# Patient Record
Sex: MALE | Race: WHITE | NOT HISPANIC OR LATINO | Employment: OTHER | URBAN - METROPOLITAN AREA
[De-identification: names, ages, dates, MRNs, and addresses within clinical notes are randomized per-mention and may not be internally consistent; named-entity substitution may affect disease eponyms.]

---

## 2017-01-31 ENCOUNTER — GENERIC CONVERSION - ENCOUNTER (OUTPATIENT)
Dept: OTHER | Facility: OTHER | Age: 77
End: 2017-01-31

## 2017-05-08 ENCOUNTER — ALLSCRIPTS OFFICE VISIT (OUTPATIENT)
Dept: OTHER | Facility: OTHER | Age: 77
End: 2017-05-08

## 2017-09-27 ENCOUNTER — ALLSCRIPTS OFFICE VISIT (OUTPATIENT)
Dept: OTHER | Facility: OTHER | Age: 77
End: 2017-09-27

## 2018-01-13 VITALS
HEIGHT: 70 IN | DIASTOLIC BLOOD PRESSURE: 60 MMHG | HEART RATE: 76 BPM | WEIGHT: 190 LBS | BODY MASS INDEX: 27.2 KG/M2 | SYSTOLIC BLOOD PRESSURE: 110 MMHG

## 2018-01-14 VITALS
SYSTOLIC BLOOD PRESSURE: 122 MMHG | BODY MASS INDEX: 28.63 KG/M2 | HEIGHT: 70 IN | DIASTOLIC BLOOD PRESSURE: 60 MMHG | WEIGHT: 200 LBS

## 2018-04-12 ENCOUNTER — PROCEDURE VISIT (OUTPATIENT)
Dept: NEUROLOGY | Facility: CLINIC | Age: 78
End: 2018-04-12
Payer: COMMERCIAL

## 2018-04-12 VITALS — SYSTOLIC BLOOD PRESSURE: 114 MMHG | RESPIRATION RATE: 14 BRPM | HEART RATE: 66 BPM | DIASTOLIC BLOOD PRESSURE: 55 MMHG

## 2018-04-12 DIAGNOSIS — Z96.89 S/P DEEP BRAIN STIMULATOR PLACEMENT: ICD-10-CM

## 2018-04-12 DIAGNOSIS — G20 PARKINSON DISEASE (HCC): Primary | ICD-10-CM

## 2018-04-12 DIAGNOSIS — K11.7 DROOLING: ICD-10-CM

## 2018-04-12 PROCEDURE — 95970 ALYS NPGT W/O PRGRMG: CPT | Performed by: PHYSICIAN ASSISTANT

## 2018-04-12 PROCEDURE — 93010 ELECTROCARDIOGRAM REPORT: CPT | Performed by: INTERNAL MEDICINE

## 2018-04-12 RX ORDER — LISINOPRIL 2.5 MG/1
0.5 TABLET ORAL DAILY
COMMUNITY
End: 2019-01-19 | Stop reason: HOSPADM

## 2018-04-12 RX ORDER — TAMSULOSIN HYDROCHLORIDE 0.4 MG/1
CAPSULE ORAL
COMMUNITY
Start: 2017-10-24 | End: 2018-10-15 | Stop reason: ALTCHOICE

## 2018-04-12 RX ORDER — ROPINIROLE 0.5 MG/1
1.5 TABLET, FILM COATED ORAL 3 TIMES DAILY
Qty: 810 TABLET | Refills: 3 | Status: SHIPPED | OUTPATIENT
Start: 2018-04-12 | End: 2018-10-15

## 2018-04-12 RX ORDER — GLIPIZIDE 5 MG/1
5 TABLET, FILM COATED, EXTENDED RELEASE ORAL DAILY
Refills: 3 | COMMUNITY
Start: 2018-03-22 | End: 2018-10-15 | Stop reason: ALTCHOICE

## 2018-04-12 RX ORDER — ATORVASTATIN CALCIUM 80 MG/1
TABLET, FILM COATED ORAL
COMMUNITY
Start: 2017-08-24

## 2018-04-12 RX ORDER — CARBIDOPA AND LEVODOPA 50; 200 MG/1; MG/1
1 TABLET, EXTENDED RELEASE ORAL
Qty: 90 TABLET | Refills: 3 | Status: SHIPPED | OUTPATIENT
Start: 2018-04-12

## 2018-04-12 RX ORDER — INSULIN GLARGINE 100 [IU]/ML
32 INJECTION, SOLUTION SUBCUTANEOUS
Refills: 3 | Status: ON HOLD | COMMUNITY
Start: 2018-03-17 | End: 2018-12-06

## 2018-04-12 RX ORDER — ROPINIROLE 0.5 MG/1
TABLET, FILM COATED ORAL
COMMUNITY
Start: 2017-11-10 | End: 2018-04-12 | Stop reason: SDUPTHER

## 2018-04-12 RX ORDER — CARBIDOPA AND LEVODOPA 50; 200 MG/1; MG/1
TABLET, EXTENDED RELEASE ORAL
COMMUNITY
Start: 2017-09-15 | End: 2018-04-12 | Stop reason: SDUPTHER

## 2018-04-12 RX ORDER — METFORMIN HYDROCHLORIDE 500 MG/1
1000 TABLET, EXTENDED RELEASE ORAL 2 TIMES DAILY
Refills: 3 | COMMUNITY
Start: 2018-02-21 | End: 2019-02-26 | Stop reason: HOSPADM

## 2018-04-12 NOTE — PROGRESS NOTES
Patient ID: Loretta Torres  is a [de-identified] y o  male  Assessment/Plan:    Parkinson disease ([facility] [address] )  Patient overall stable  He does have some leg and intermittent hand tremor on exam however this is not bothersome to the patient  He states that this gets better after taking his dose of Sinemet as well  He felt there was improvement of his tremors with the changes made at the last visit and he is now able to function well  His DBS was interrogated however no changes were made to his current settings given he is not bothered by his tremors and he is already at high settings  He was given some room with the patient  however if the tremors become more of an issue in the future  He was noted to have some bradykinesia on exam and discussed the option of a slight increase of his Sinemet dose  He does not wish to make any changes however  If in the future he changes his mind will have him try and increase to Sinemet 1 5tabs tid  He will also remain on Sinemet CR at night and ropinirole for now  He was encouraged to get more active and his daughter will be starting to come to the house throughout the week and will be starting an exercise routine with him  Drooling  Patient continues with moderate drooling  He is not interested in any treatment, medications or neurotoxin injections, at this time  S/P deep brain stimulator placement  Patient with a chargeable battery  Per the daughter he charges this every other day and the days when he gets charged he feels "wiped out"  She feels that he can not walk as well on those days and is not able to function as well as he does on the days he does not get charged  I am not clear why this would be however will run it by Lico Pan with [email or web address] to see if he has any thoughts              Subjective:    Mr Leandro Williamson is an [de-identified]year old man with Parkinson's disease diagnosed in 2005 s/p bilateral VIM DBS Activa PC 3/8/12 and device on 3/15/12 with IPG replacements 5/14/15 and 9/12/15, here for follow up  He was programmed by Jeffery Purcell from [place] since 2012 and was followed by Dr Lenore Pandey and presented to Dr Jane Mejia for transfer of care per patient and family in Oct 2015  He no longer follows with Dr Narda Shen and was managed by his  PCP, Dr Jackie Lock in [address] [place] [address]  To review, initial symptoms were facial masking followed by resting tremors  Symptoms worsened over time with changes in his gait with freezing of gait and drooling  After surgery tremors have been well controlled  At his last visit he was having some increased tremor and voltage was increased to 4 1 on the LEFT and 4 4 on the RIGHT with improvement  He also remains on Sinemet 25/100 1 tab tid (6:30am, noon, 4pm), Sinemet ER 50/200 qhs and ropinirole 0 5mg 3 tabs po tid (same time as levodopa)  He feels that he is doing well since the last visit  He has improvement of his tremors since the changes at the last visit  He lives with his wife  His son will help with showering and his wife helps with dressing  He walks well with the walker and cane  He has a rechargeable battery and per the daughter he is more sluggish when he gets charged  He gets charged every other day  No falls  He is eating well and denies any issues with swallowing  He has a lot of drooling during the day and he carries a rag  He is not interested in any treatment for this  He sleeps well through the night  He feels that the medication helps with his movements and he is able to get out of the chair easier  He denies any issues with feeding himself  He will have some tremor if he tries to eat right after taking his medication, if he waits a few minutes the tremors resolve  He walks around the home for exercise  He had PT a few months ago  Per the daughter he did better with the therapy  Objective:    Blood pressure 114/55, pulse 66, resp  rate 14      Physical Exam   Constitutional: He appears well-developed and well-nourished  Eyes: EOM are normal  Pupils are equal, round, and reactive to light  Neurological Exam    Mental Status  The patient is alert and oriented to person, place, time, and situation  He has normal attention span and concentration  He has a normal fund of knowledge  Cranial Nerves    CN II: The patient's visual acuity and visual fields are normal   CN III, IV, VI: The patient's pupils are equally round and reactive to light and ocular movements are normal   CN V: The patient has normal facial sensation  CN VII:  The patient has symmetric facial movement  CN VIII:  The patient's hearing is normal   CN IX, X: The patient has symmetric palate movement and normal gag reflex  CN XI: The patient's shoulder shrug strength is normal   CN XII: The patient's tongue is midline without atrophy or fasciculations  Motor    Mild amplitude rest tremor RUE 0, LUE 0, RLE 0, LLE 1  Mild action tremor with finger to nose testing on the right  Normal on the left  Mild to moderate bradykinesia on FT 2<2, HG 1,2 , ANEESH 1,2 , HT 1,1  No dyskinesia or dystonia  Mild rigidity bilaterally with activation  Moderate hypomimia  Moderate hypophonia  Sensory  The patient's sensation is to light touch  Reflexes  He has glabellar tap release signs present  He has no palmomental release signs  Gait and Coordination    Patient not up for ambulating today given he feels wiped out  ROS:    Review of Systems   Constitutional: Positive for fatigue  Negative for appetite change and fever  HENT: Positive for trouble swallowing  Negative for hearing loss, tinnitus and voice change  Eyes: Negative  Negative for photophobia and pain  Dry eyes   Respiratory: Positive for cough  Negative for shortness of breath  Cardiovascular: Negative  Negative for palpitations  Gastrointestinal: Negative  Negative for nausea and vomiting  Endocrine: Negative    Negative for cold intolerance and heat intolerance  Genitourinary: Negative  Negative for dysuria, frequency and urgency  Musculoskeletal: Positive for back pain and gait problem  Negative for myalgias and neck pain  Difficulty walking   Skin: Negative  Negative for rash  Skin lesions   Neurological: Positive for tremors and speech difficulty  Negative for dizziness, seizures, syncope, facial asymmetry, weakness, light-headedness, numbness and headaches  Snoring   Hematological: Negative  Does not bruise/bleed easily  Psychiatric/Behavioral: Positive for sleep disturbance  Negative for confusion and hallucinations

## 2018-04-13 PROBLEM — K11.7 DROOLING: Status: ACTIVE | Noted: 2018-04-13

## 2018-04-13 PROBLEM — Z96.89 S/P DEEP BRAIN STIMULATOR PLACEMENT: Status: ACTIVE | Noted: 2018-04-13

## 2018-04-13 NOTE — ASSESSMENT & PLAN NOTE
Patient overall stable  He does have some leg and intermittent hand tremor on exam however this is not bothersome to the patient  He states that this gets better after taking his dose of Sinemet as well  He felt there was improvement of his tremors with the changes made at the last visit and he is now able to function well  His DBS was interrogated however no changes were made to his current settings given he is not bothered by his tremors and he is already at high settings  He was given some room with the patient  however if the tremors become more of an issue in the future  He was noted to have some bradykinesia on exam and discussed the option of a slight increase of his Sinemet dose  He does not wish to make any changes however  If in the future he changes his mind will have him try and increase to Sinemet 1 5tabs tid  He will also remain on Sinemet CR at night and ropinirole for now  He was encouraged to get more active and his daughter will be starting to come to the house throughout the week and will be starting an exercise routine with him

## 2018-04-13 NOTE — ASSESSMENT & PLAN NOTE
Patient with a chargeable battery  Per the daughter he charges this every other day and the days when he gets charged he feels "wiped out"  She feels that he can not walk as well on those days and is not able to function as well as he does on the days he does not get charged  I am not clear why this would be however will run it by Adriana Wray with [employer] to see if he has any thoughts

## 2018-04-13 NOTE — ASSESSMENT & PLAN NOTE
Patient continues with moderate drooling  He is not interested in any treatment, medications or neurotoxin injections, at this time

## 2018-04-16 ENCOUNTER — APPOINTMENT (EMERGENCY)
Dept: CT IMAGING | Facility: HOSPITAL | Age: 78
End: 2018-04-16
Payer: COMMERCIAL

## 2018-04-16 ENCOUNTER — HOSPITAL ENCOUNTER (EMERGENCY)
Facility: HOSPITAL | Age: 78
Discharge: HOME | End: 2018-04-16
Attending: EMERGENCY MEDICINE | Admitting: EMERGENCY MEDICINE
Payer: COMMERCIAL

## 2018-04-16 ENCOUNTER — APPOINTMENT (EMERGENCY)
Dept: RADIOLOGY | Facility: HOSPITAL | Age: 78
End: 2018-04-16
Payer: COMMERCIAL

## 2018-04-16 VITALS
WEIGHT: 200 LBS | OXYGEN SATURATION: 94 % | HEART RATE: 60 BPM | HEIGHT: 70 IN | RESPIRATION RATE: 16 BRPM | TEMPERATURE: 97.5 F | DIASTOLIC BLOOD PRESSURE: 79 MMHG | SYSTOLIC BLOOD PRESSURE: 140 MMHG | BODY MASS INDEX: 28.63 KG/M2

## 2018-04-16 DIAGNOSIS — S09.90XA MINOR HEAD INJURY, INITIAL ENCOUNTER: ICD-10-CM
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
## 2018-04-16 DIAGNOSIS — S00.03XA HEMATOMA OF LEFT PARIETAL SCALP, INITIAL ENCOUNTER: ICD-10-CM

## 2018-04-16 LAB
ANION GAP SERPL CALCULATED.3IONS-SCNC: 9 MMOL/L (ref 4–13)
BACTERIA UR QL AUTO: ABNORMAL /HPF
BASOPHILS # BLD AUTO: 0.02 THOUSANDS/ΜL (ref 0–0.1)
BASOPHILS NFR BLD AUTO: 0 % (ref 0–1)
BILIRUB UR QL STRIP: NEGATIVE
BUN SERPL-MCNC: 25 MG/DL (ref 5–25)
CALCIUM SERPL-MCNC: 9.4 MG/DL (ref 8.3–10.1)
CHLORIDE SERPL-SCNC: 102 MMOL/L (ref 100–108)
CLARITY UR: CLEAR
CO2 SERPL-SCNC: 25 MMOL/L (ref 21–32)
COLOR UR: YELLOW
CREAT SERPL-MCNC: 1.27 MG/DL (ref 0.6–1.3)
EOSINOPHIL # BLD AUTO: 0.11 THOUSAND/ΜL (ref 0–0.61)
EOSINOPHIL NFR BLD AUTO: 2 % (ref 0–6)
ERYTHROCYTE [DISTWIDTH] IN BLOOD BY AUTOMATED COUNT: 13.2 % (ref 11.6–15.1)
GFR SERPL CREATININE-BSD FRML MDRD: 53 ML/MIN/1.73SQ M
GLUCOSE SERPL-MCNC: 125 MG/DL (ref 65–140)
GLUCOSE UR STRIP-MCNC: NEGATIVE MG/DL
HCT VFR BLD AUTO: 40 % (ref 36.5–49.3)
HGB BLD-MCNC: 13.2 G/DL (ref 12–17)
HGB UR QL STRIP.AUTO: ABNORMAL
KETONES UR STRIP-MCNC: NEGATIVE MG/DL
LEUKOCYTE ESTERASE UR QL STRIP: NEGATIVE
LYMPHOCYTES # BLD AUTO: 1.8 THOUSANDS/ΜL (ref 0.6–4.47)
LYMPHOCYTES NFR BLD AUTO: 27 % (ref 14–44)
MAGNESIUM SERPL-MCNC: 1.6 MG/DL (ref 1.6–2.6)
MCH RBC QN AUTO: 31.6 PG (ref 26.8–34.3)
MCHC RBC AUTO-ENTMCNC: 33 G/DL (ref 31.4–37.4)
MCV RBC AUTO: 96 FL (ref 82–98)
MONOCYTES # BLD AUTO: 0.76 THOUSAND/ΜL (ref 0.17–1.22)
MONOCYTES NFR BLD AUTO: 11 % (ref 4–12)
NEUTROPHILS # BLD AUTO: 4.07 THOUSANDS/ΜL (ref 1.85–7.62)
NEUTS SEG NFR BLD AUTO: 60 % (ref 43–75)
NITRITE UR QL STRIP: NEGATIVE
NON-SQ EPI CELLS URNS QL MICRO: ABNORMAL /HPF
NRBC BLD AUTO-RTO: 0 /100 WBCS
PH UR STRIP.AUTO: 5 [PH] (ref 4.5–8)
PLATELET # BLD AUTO: 206 THOUSANDS/UL (ref 149–390)
PMV BLD AUTO: 9.9 FL (ref 8.9–12.7)
POTASSIUM SERPL-SCNC: 4.3 MMOL/L (ref 3.5–5.3)
PROT UR STRIP-MCNC: NEGATIVE MG/DL
RBC # BLD AUTO: 4.18 MILLION/UL (ref 3.88–5.62)
RBC #/AREA URNS AUTO: ABNORMAL /HPF
SODIUM SERPL-SCNC: 136 MMOL/L (ref 136–145)
SP GR UR STRIP.AUTO: 1.02 (ref 1–1.03)
TROPONIN I SERPL-MCNC: <0.02 NG/ML
UROBILINOGEN UR QL STRIP.AUTO: 0.2 E.U./DL
WBC # BLD AUTO: 6.79 THOUSAND/UL (ref 4.31–10.16)
WBC #/AREA URNS AUTO: ABNORMAL /HPF

## 2018-04-16 PROCEDURE — 80048 BASIC METABOLIC PNL TOTAL CA: CPT | Performed by: EMERGENCY MEDICINE

## 2018-04-16 PROCEDURE — 71046 X-RAY EXAM CHEST 2 VIEWS: CPT

## 2018-04-16 PROCEDURE — 84484 ASSAY OF TROPONIN QUANT: CPT | Performed by: EMERGENCY MEDICINE

## 2018-04-16 PROCEDURE — 83735 ASSAY OF MAGNESIUM: CPT | Performed by: EMERGENCY MEDICINE

## 2018-04-16 PROCEDURE — 85025 COMPLETE CBC W/AUTO DIFF WBC: CPT | Performed by: EMERGENCY MEDICINE

## 2018-04-16 PROCEDURE — 81001 URINALYSIS AUTO W/SCOPE: CPT | Performed by: EMERGENCY MEDICINE

## 2018-04-16 PROCEDURE — 99284 EMERGENCY DEPT VISIT MOD MDM: CPT

## 2018-04-16 PROCEDURE — 70450 CT HEAD/BRAIN W/O DYE: CPT

## 2018-04-16 PROCEDURE — 36415 COLL VENOUS BLD VENIPUNCTURE: CPT | Performed by: EMERGENCY MEDICINE

## 2018-04-16 RX ORDER — CARBIDOPA AND LEVODOPA 25; 100 MG/1; MG/1
2 TABLET, EXTENDED RELEASE ORAL ONCE
Status: DISCONTINUED | OUTPATIENT
Start: 2018-04-16 | End: 2018-04-16 | Stop reason: HOSPADM

## 2018-04-16 RX ORDER — CARBIDOPA AND LEVODOPA 50; 200 MG/1; MG/1
1 TABLET, EXTENDED RELEASE ORAL ONCE
Status: DISCONTINUED | OUTPATIENT
Start: 2018-04-16 | End: 2018-04-16

## 2018-04-17 LAB
ATRIAL RATE: 60 BPM
P AXIS: -25 DEGREES
PR INTERVAL: 160 MS
QRS AXIS: -44 DEGREES
QRSD INTERVAL: 142 MS
QT INTERVAL: 476 MS
QTC INTERVAL: 476 MS
T WAVE AXIS: -15 DEGREES
VENTRICULAR RATE: 60 BPM

## 2018-04-17 NOTE — DISCHARGE INSTRUCTIONS
Head Injury   WHAT YOU NEED TO KNOW:   A head injury is most often caused by a blow to the head  This may occur from a fall, bicycle injury, sports injury, being struck in the head, or a motor vehicle accident  DISCHARGE INSTRUCTIONS:   Call 911 or have someone else call for any of the following:   · You cannot be woken  · You have a seizure  · You stop responding to others or you faint  · You have blurry or double vision  · Your speech becomes slurred or confused  · You have arm or leg weakness, loss of feeling, or new problems with coordination  · Your pupils are larger than usual or one pupil is a different size than the other  · You have blood or clear fluid coming out of your ears or nose  Seek care immediately if:   · You have repeated or forceful vomiting  · You feel confused  · Your headache gets worse or becomes severe  · You or someone caring for you notices that you are harder to wake than usual   Contact your healthcare provider if:   · Your symptoms last longer than 6 weeks after the injury  · You have questions or concerns about your condition or care  Medicines:   · Acetaminophen  decreases pain  Acetaminophen is available without a doctor's order  Ask how much to take and how often to take it  Follow directions  Acetaminophen can cause liver damage if not taken correctly  · Take your medicine as directed  Contact your healthcare provider if you think your medicine is not helping or if you have side effects  Tell him or her if you are allergic to any medicine  Keep a list of the medicines, vitamins, and herbs you take  Include the amounts, and when and why you take them  Bring the list or the pill bottles to follow-up visits  Carry your medicine list with you in case of an emergency  Self-care:   · Rest  or do quiet activities for 24 to 48 hours  Limit your time watching TV, using the computer, or doing tasks that require a lot of thinking   Slowly return to your normal activities as directed  Do not play sports or do activities that may cause you to get hit in the head  Ask your healthcare provider when you can return to sports  · Apply ice  on your head for 15 to 20 minutes every hour or as directed  Use an ice pack, or put crushed ice in a plastic bag  Cover it with a towel before you apply it to your skin  Ice helps prevent tissue damage and decreases swelling and pain  · Have someone stay with you for 24 hours  or as directed  This person can monitor you for complications and call [phone]  When you are awake the person should ask you a few questions to see if you are thinking clearly  An example would be to ask your name or your address  Prevent another head injury:   · Wear a helmet that fits properly  Do this when you play sports, or ride a bike, scooter, or skateboard  Helmets help decrease your risk of a serious head injury  Talk to your healthcare provider about other ways you can protect yourself if you play sports  · Wear your seat belt every time you are in a car  This helps to decrease your risk for a head injury if you are in a car accident  Follow up with your healthcare provider as directed:  Write down your questions so you remember to ask them during your visits  © 2017 [address] Information is for End User's use only and may not be sold, redistributed or otherwise used for commercial purposes  All illustrations and images included in CareNotes® are the copyrighted property of [employer] M [employer]  or [employer]  The above information is an  only  It is not intended as medical advice for individual conditions or treatments  Talk to your doctor, nurse or pharmacist before following any medical regimen to see if it is safe and effective for you  Scalp Contusion in Adults   WHAT YOU NEED TO KNOW:   A scalp contusion is a bruise on your scalp   There is bleeding under the scalp, but the skin is not broken  You may have swelling at the site of the bruise  DISCHARGE INSTRUCTIONS:   Home care:   · Have someone stay with you for 24 to 48 hours after the injury  Give him the signs of serious injury listed below, such as a seizure or trouble moving  You will need immediate care if you develop signs of a serious injury  · Apply ice to your bruise  Ice helps decrease swelling and pain  Ice may also help prevent tissue damage  Use an ice pack, or put crushed ice in a plastic bag  Cover it with a towel and place it on your bruise for 15 to 20 minutes every hour or as directed  Follow up with your healthcare provider as directed:  Write down your questions so you remember to ask them during your visits  Contact your healthcare provider if:   · You have a headache or neck pain that is getting worse  · You are drowsy and confused  · You have trouble staying balanced or walking  · You are irritable for no reason  · You have problems with your vision  · You cannot stop vomiting  Return to the emergency department or have someone call 911 if:   · You have a seizure  · You cannot be awakened  · You are not able to move part of your body  · Your pupils are different sizes  · You have blood or clear fluid coming out of your nose, ears, or mouth  © 2017 [facility] Information is for End User's use only and may not be sold, redistributed or otherwise used for commercial purposes  All illustrations and images included in CareNotes® are the copyrighted property of [employer] M [employer] Inc  or Jevon Lgauna  The above information is an  only  It is not intended as medical advice for individual conditions or treatments  Talk to your doctor, nurse or pharmacist before following any medical regimen to see if it is safe and effective for you        Fall Prevention for Older Adults   WHAT YOU NEED TO KNOW:   As you age, your muscles weaken and your risk for falls increases  Your risk also increases if you take medicines that make you sleepy or dizzy  You may also be at risk if you have vision or joint problems, have low blood pressure, or are not active  DISCHARGE INSTRUCTIONS:   Call 911 or have someone else call if:   · You have fallen and are unconscious  · You have fallen and cannot move part of your body  Contact your healthcare provider if:   · You have fallen and have pain or a headache  · You have questions or concerns about your condition or care  Fall prevention tips:   · Stay active  Exercise can help strengthen your muscles and improve your balance  Your healthcare provider may recommend water aerobics, walking, or Yuan Chi  He may also recommend physical therapy to improve your coordination  Never start an exercise program without asking your healthcare provider first     · Wear shoes that fit well and have soles that   Wear shoes both inside and outside  Use slippers with good   Avoid shoes with high heels  · Use assistive devices as directed  Your healthcare provider may suggest that you use a cane or walker to help you keep your balance  You may need to have grab bars put in your bathroom near the toilet or in the shower  · Stand or sit up slowly  This may help you keep your balance and prevent falls  · Wear a personal alarm  This is a device that allows you to call 911 if you need help  Ask for more information on personal alarms  · Manage your medical conditions  Keep all appointments with your healthcare providers  Visit your eye doctor as directed  Home safety tips:   · Add items to prevent falls in the bathroom  Put nonslip strips on your bath or shower floor to prevent you from slipping  Use a bath mat if you do not have carpet in the bathroom  This will prevent you from falling when you step out of the bath or shower  Use a shower seat so you do not need to stand while you shower   Sit on the toilet or a chair in your bathroom to dry yourself and put on clothing  This will prevent you from losing your balance from drying or dressing yourself while you are standing  · Keep paths clear  Remove books, shoes, and other objects from walkways and stairs  Place cords for telephones and lamps out of the way so that you do not need to walk over them  Tape them down if you cannot move them  Remove small rugs  If you cannot remove a rug, secure it with double-sided tape  This will prevent you from tripping  · Install bright lights in your home  Use night lights to help light paths to the bathroom or kitchen  Always turn on the light before you start walking  · Keep items you use often on shelves within reach  Do not use a step stool to help you reach an item  · Paint or place reflective tape on the edges of your stairs  This will help you see the stairs better  Follow up with your healthcare provider as directed:  Write down your questions so you remember to ask them during your visits  © 2017 [address] [facility] Information is for End User's use only and may not be sold, redistributed or otherwise used for commercial purposes  All illustrations and images included in CareNotes® are the copyrighted property of A D A M [employer] Inc  or Jevon Laguna  The above information is an  only  It is not intended as medical advice for individual conditions or treatments  Talk to your doctor, nurse or pharmacist before following any medical regimen to see if it is safe and effective for you

## 2018-04-17 NOTE — ED PROVIDER NOTES
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, BPH, coronary artery disease status post stent, defibrillator placement, cholecystectomy, appendectomy, presents to the emergency department after a fall at home  Patient states he was walking back from going to the bathroom earlier today but was not using his walker which she is supposed to use all of the time and he thinks he lost his balance which caused him to fall backwards  He struck the left side of his head on the night stand  He denies loss of consciousness  He went to his family doctor's who recommended he come to the ED given that he takes aspirin daily  He is not on any anticoagulant agents  He denies any presyncopal symptoms prior to falling and states he simply lost his balance  He has no complaints at this time  Denies headache, dizziness or near syncope, URI symptoms, cough, recent fever or chills, chest pain, palpitations, dyspnea, abdominal pain, nausea, vomiting, change in bowel habits, blood per rectum or melena, dysuria, frequency, hematuria, flank pain, neck or back pain, skin rash or color change, new extremity weakness or paresthesia or other focal neurologic deficits  According to patient's daughter, no history of dementia  History provided by:  Patient   used: No        Prior to Admission Medications   Prescriptions Last Dose Informant Patient Reported? Taking?    Cyanocobalamin (VITAMIN B 12 PO)   Yes Yes   Sig: Take by mouth   LANTUS SOLOSTAR injection pen 100 units/mL   Yes Yes   Sig: Inject 35 Units under the skin daily at bedtime   aspirin 81 MG tablet   Yes Yes   Sig: Take 1 tablet by mouth daily   atorvastatin (LIPITOR) 80 mg tablet   Yes Yes   Sig: TAKE 1 TABLET BY MOUTH EVERY DAY   carbidopa-levodopa (SINEMET CR)  mg per tablet   No Yes   Sig: Take 1 tablet by mouth daily at bedtime   carbidopa-levodopa (SINEMET)  mg per tablet   No Yes   Sig: Take 1 tablet by mouth 3 (three) times a day   cyanocobalamin 250 MCG tablet   Yes Yes   Si tab(s)   glipiZIDE (GLUCOTROL XL) 5 mg 24 hr tablet   Yes Yes   Sig: Take 5 mg by mouth daily   lisinopril (ZESTRIL) 2 5 mg tablet   Yes Yes   Sig: Take 0 5 tablets by mouth daily   metFORMIN (GLUCOPHAGE-XR) 500 mg 24 hr tablet   Yes Yes   Sig: Take 1,000 mg by mouth 2 (two) times a day   metoprolol tartrate (LOPRESSOR) 25 mg tablet   Yes Yes   Sig: Take 12 5 mg by mouth 2 (two) times a day   rOPINIRole (REQUIP) 0 5 mg tablet   No Yes   Sig: Take 3 tablets (1 5 mg total) by mouth 3 (three) times a day   tamsulosin (FLOMAX) 0 4 mg   Yes Yes   Sig: TAKE 1 CAPSULE EVERY DAY      Facility-Administered Medications: None       Past Medical History:   Diagnosis Date    Arthritis     BPH (benign prostatic hyperplasia)     Diabetes (HCC)     Heart problem     Movement disorder     [facility]'s disease ([facility] [address] )     [facility]        Past Surgical History:   Procedure Laterality Date    APPENDECTOMY      CARDIAC DEFIBRILLATOR PLACEMENT      CARDIAC DEFIBRILLATOR PLACEMENT      CHOLECYSTECTOMY      CORONARY ANGIOPLASTY WITH STENT PLACEMENT      DEEP BRAIN STIMULATOR PLACEMENT      EAR SURGERY      GALLBLADDER SURGERY      KNEE SURGERY      SKIN CANCER EXCISION         Family History   Problem Relation Age of Onset    Cancer Mother     Diabetes Father     Parkinsonism Father     Heart attack Father      I have reviewed and agree with the history as documented      Social History   Substance Use Topics    Smoking status: Never Smoker    Smokeless tobacco: Never Used    Alcohol use No        Review of Systems   Constitutional: Negative for chills, fatigue and fever  HENT: Negative for congestion, ear pain, hearing loss, rhinorrhea, sore throat and tinnitus  Eyes: Negative for photophobia, pain and visual disturbance  Respiratory: Negative for cough, chest tightness, shortness of breath and wheezing  Cardiovascular: Negative for chest pain and palpitations  Gastrointestinal: Negative for abdominal pain, blood in stool, constipation, diarrhea, nausea and vomiting  Genitourinary: Negative for dysuria, flank pain, frequency and hematuria  Musculoskeletal: Negative for back pain, neck pain and neck stiffness  Skin: Negative for color change, rash and wound  Allergic/Immunologic: Negative for immunocompromised state  Neurological: Negative for dizziness, syncope, weakness, light-headedness, numbness and headaches  Hematological: Negative for adenopathy  Does not bruise/bleed easily  Psychiatric/Behavioral: Negative for confusion and decreased concentration  All other systems reviewed and are negative  Physical Exam  ED Triage Vitals   Temperature Pulse Respirations Blood Pressure SpO2   04/16/18 1813 04/16/18 1811 04/16/18 1811 04/16/18 1811 04/16/18 1811   97 5 °F (36 4 °C) 64 18 104/61 95 %      Temp Source Heart Rate Source Patient Position - Orthostatic VS BP Location FiO2 (%)   04/16/18 1813 04/16/18 1811 04/16/18 1811 04/16/18 1811 --   Oral Monitor Sitting Left arm       Pain Score       04/16/18 1946       No Pain         Vitals:    04/16/18 1813 04/16/18 1833 04/16/18 1843 04/16/18 1946   BP:  126/69 126/69 114/58   BP Location:  Right arm Right arm Right arm   Pulse:  57 60 60   Resp:  18 16    Temp: 97 5 °F (36 4 °C)      TempSrc: Oral      SpO2:  96% 93% 95%   Weight:       Height:         Physical Exam   Constitutional: He is oriented to person, place, and time  He appears well-developed and well-nourished  No distress  HENT:   Head: Normocephalic     Right Ear: External ear normal    Left Ear: External ear normal    Nose: Nose normal    Mouth/Throat: Oropharynx is clear and moist  No oropharyngeal exudate  Small left parietal scalp hematoma  No laceration  Eyes: Conjunctivae and EOM are normal  Pupils are equal, round, and reactive to light  Neck: Normal range of motion  Neck supple  No JVD present  Cardiovascular: Normal rate, regular rhythm, normal heart sounds and intact distal pulses  Exam reveals no gallop and no friction rub  No murmur heard  Pulmonary/Chest: Effort normal and breath sounds normal  No respiratory distress  He has no wheezes  He has no rales  He exhibits no tenderness  Abdominal: Soft  Bowel sounds are normal  He exhibits no distension  There is no tenderness  There is no rebound and no guarding  Musculoskeletal: Normal range of motion  He exhibits no edema or tenderness  Lymphadenopathy:     He has no cervical adenopathy  Neurological: He is alert and oriented to person, place, and time  Mild dysarthria  Mild left facial drooping  According to daughter this is all baseline  No gross motor or sensory deficits  5/5 strength in all 4 extremities  Skin: Skin is warm and dry  No rash noted  He is not diaphoretic  No pallor  Psychiatric: He has a normal mood and affect  His behavior is normal    Nursing note and vitals reviewed        ED Medications  Medications   carbidopa-levodopa (SINEMET CR)  mg per ER tablet 2 tablet (not administered)       Diagnostic Studies  Results Reviewed     Procedure Component Value Units Date/Time    Urine Microscopic [[id number]]  (Abnormal) Collected:  04/16/18 1951    Lab Status:  Final result Specimen:  Urine from Urine, Clean Catch Updated:  04/16/18 2019     RBC, UA 0-1 (A) /hpf      WBC, UA None Seen /hpf      Epithelial Cells Occasional /hpf      Bacteria, UA Occasional /hpf     Troponin I [[id number]]  (Normal) Collected:  04/16/18 1945    Lab Status:  Final result Specimen:  Blood from Arm, Right Updated:  04/16/18 2009 Troponin I <0 02 ng/mL     Narrative:         [employer] Chemistry analyzer 99% cutoff is > 0 04 ng/mL in network labs    o cTnI 99% cutoff is useful only when applied to patients in the clinical setting of myocardial ischemia  o cTnI 99% cutoff should be interpreted in the context of clinical history, ECG findings and possibly cardiac imaging to establish correct diagnosis  o cTnI 99% cutoff may be suggestive but clearly not indicative of a coronary event without the clinical setting of myocardial ischemia  Basic metabolic panel [[id number]] Collected:  04/16/18 1945    Lab Status:  Final result Specimen:  Blood from Arm, Right Updated:  04/16/18 2002     Sodium 136 mmol/L      Potassium 4 3 mmol/L      Chloride 102 mmol/L      CO2 25 mmol/L      Anion Gap 9 mmol/L      BUN 25 mg/dL      Creatinine 1 27 mg/dL      Glucose 125 mg/dL      Calcium 9 4 mg/dL      eGFR 53 ml/min/1 73sq m     Narrative:         National Kidney Disease Education Program recommendations are as follows:  GFR calculation is accurate only with a steady state creatinine  Chronic Kidney disease less than 60 ml/min/1 73 sq  meters  Kidney failure less than 15 ml/min/1 73 sq  meters      Magnesium [[id number]]  (Normal) Collected:  04/16/18 1945    Lab Status:  Final result Specimen:  Blood from Arm, Right Updated:  04/16/18 2002     Magnesium 1 6 mg/dL     UA w Reflex to Microscopic [[id number]]  (Abnormal) Collected:  04/16/18 1951    Lab Status:  Final result Specimen:  Urine from Urine, Clean Catch Updated:  04/16/18 1958     Color, UA Yellow     Clarity, UA Clear     Specific Gravity, UA 1 025     pH, UA 5 0     Leukocytes, UA Negative     Nitrite, UA Negative     Protein, UA Negative mg/dl      Glucose, UA Negative mg/dl      Ketones, UA Negative mg/dl      Urobilinogen, UA 0 2 E U /dl      Bilirubin, UA Negative     Blood, UA Trace-Intact (A)    CBC and differential [[id number]]  (Normal) Collected:  04/16/18 1945    Lab Status:  Final result Specimen:  Blood from Arm, Right Updated:  04/16/18 1953     WBC 6 79 Thousand/uL      RBC 4 18 Million/uL      Hemoglobin 13 2 g/dL      Hematocrit 40 0 %      MCV 96 fL      MCH 31 6 pg      MCHC 33 0 g/dL      RDW 13 2 %      MPV 9 9 fL      Platelets 126 Thousands/uL      nRBC 0 /100 WBCs      Neutrophils Relative 60 %      Lymphocytes Relative 27 %      Monocytes Relative 11 %      Eosinophils Relative 2 %      Basophils Relative 0 %      Neutrophils Absolute 4 07 Thousands/µL      Lymphocytes Absolute 1 80 Thousands/µL      Monocytes Absolute 0 76 Thousand/µL      Eosinophils Absolute 0 11 Thousand/µL      Basophils Absolute 0 02 Thousands/µL                  CT head without contrast   Final Result by Epifanio Stokes DO (04/16 2047)      No acute intracranial abnormality  Microangiopathic changes  Bilateral deep brain stimulator leads appear grossly unchanged  Workstation performed: [id number]         XR chest 2 views   Final Result by Fredis Delgado MD (04/16 2033)      No acute cardiopulmonary disease  Workstation performed: [id number]                    Procedures  ECG 12 Lead Documentation  Date/Time: 4/16/2018 6:50 PM  Performed by: Missy Escalera by: Chely Faust     ECG reviewed by me, the ED Provider: yes    Patient location:  ED  Previous ECG:     Previous ECG:  Compared to current    Comparison ECG info:  No significant change from prior EKG on 9/18/2015  Rate:     ECG rate:  60    ECG rate assessment: normal    Rhythm:     Rhythm: sinus rhythm and paced    Pacing:     Capture:  Complete    Type of pacing:  Atrial  Ectopy:     Ectopy: none    QRS:     QRS axis:  Left    QRS intervals:   Wide  Conduction:     Conduction: abnormal      Abnormal conduction: complete RBBB    ST segments:     ST segments:  Normal  T waves:     T waves: non-specific    Q waves:     Q waves:  V2, V3, V1 and V4           Phone Contacts  ED Phone Contact    ED Course  ED Course as of Apr 16 2100 Mon Apr 16, 2018 2058 Workup unremarkable  CT scan of head normal/unchanged  Patient and family updated  Pt stable for discharge  Discussed ED return parameters including signs and symptoms of head injury to look out for  MDM  Number of Diagnoses or Management Options  Diagnosis management comments: 12-year-old male presents status post fall and head injury without loss of consciousness  Patient likely lost balance as he was not using his walker  He denies any presyncopal symptoms  Given age and medical comorbidities, will check basic blood work as well as chest x-ray  Will obtain a CT scan of the head to rule out intracranial hemorrhage  If negative workup, will discharge home  Amount and/or Complexity of Data Reviewed  Clinical lab tests: ordered and reviewed  Tests in the radiology section of CPT®: ordered and reviewed  Tests in the medicine section of CPT®: reviewed and ordered  Obtain history from someone other than the patient: yes  Independent visualization of images, tracings, or specimens: yes      CritCare Time    Disposition  Final diagnoses:   Fall, initial encounter   Minor head injury, initial encounter   Hematoma of left parietal scalp, initial encounter     Time reflects when diagnosis was documented in both MDM as applicable and the Disposition within this note     Time User Action Codes Description Comment    4/16/2018  8:57 PM Alexandra Kaileye E Add [H33  RFIL] Fall, initial encounter     4/16/2018  8:57 PM Alexandra Spire E Add [S09 90XA] Minor head injury, initial encounter     4/16/2018  8:57 PM Alexandra Spire E Add [S00 [id number]] Hematoma of left parietal scalp, initial encounter       ED Disposition     ED Disposition Condition Comment    Discharge  [address]  discharge to home/self care      Condition at discharge: Stable        Follow-up Information     Follow up With Specialties Details Why Contact Info Additional Information    Fide Hammond MD Internal Medicine Schedule an appointment as soon as possible for a visit  [address] [facility]  [address]  [address] [place] [id number] [address] [facility] Emergency Department Emergency Medicine Go to If symptoms worsen [address] [place] [address]  [phone] [place] ED, [address], [facility]        Patient's Medications   Discharge Prescriptions    No medications on file     No discharge procedures on file      ED Provider  Electronically Signed by           Neva Pinto DO  04/16/18 2100

## 2018-10-15 ENCOUNTER — PROCEDURE VISIT (OUTPATIENT)
Dept: NEUROLOGY | Facility: CLINIC | Age: 78
End: 2018-10-15
Payer: COMMERCIAL

## 2018-10-15 VITALS
HEIGHT: 70 IN | WEIGHT: 195 LBS | BODY MASS INDEX: 27.92 KG/M2 | SYSTOLIC BLOOD PRESSURE: 98 MMHG | DIASTOLIC BLOOD PRESSURE: 60 MMHG

## 2018-10-15 DIAGNOSIS — R13.10 DYSPHAGIA, UNSPECIFIED TYPE: ICD-10-CM

## 2018-10-15 DIAGNOSIS — K11.7 DROOLING: ICD-10-CM

## 2018-10-15 DIAGNOSIS — G20 PARKINSON'S DISEASE WITH USE OF ELECTRICAL BRAIN STIMULATION (HCC): Primary | ICD-10-CM

## 2018-10-15 PROCEDURE — 95978 PR ANALYZE NEUROSTIM BRAIN, FIRST 1H: CPT | Performed by: PSYCHIATRY & NEUROLOGY

## 2018-10-15 PROCEDURE — 99214 OFFICE O/P EST MOD 30 MIN: CPT | Performed by: PSYCHIATRY & NEUROLOGY

## 2018-10-15 RX ORDER — ROPINIROLE 2 MG/1
2 TABLET, FILM COATED, EXTENDED RELEASE ORAL
Qty: 90 TABLET | Refills: 1 | Status: SHIPPED | OUTPATIENT
Start: 2018-10-15

## 2018-10-15 NOTE — ASSESSMENT & PLAN NOTE
I recommended a trial of atropine drops  He wishes not to add any medication  Will call should he change his mind

## 2018-10-15 NOTE — PATIENT INSTRUCTIONS
Will obtain video swallow evaluation to further assess swallow function  Call if you change your mind about taking a medication for drooling  We can try atropine drops under the tongue

## 2018-10-15 NOTE — ASSESSMENT & PLAN NOTE
Main issues today are tremor of left leg, drooling and dysphagia  Over 40 minutes spent with the patient  Deep brain stimulator was interrogated and changes made with dampening of left leg tremor  Increase in voltage in right lead, no change so returned to prior  Increase in Freq to 210, 220 Hz- no change so returned to prior  Increased PW to 150 and tremor dampened  He has daytime sedation which he feels is related to his PD medication  We can try switching ropinirole to ropinirole XL to be given at night to see if this may help reduce daytime sedation and maintain on during the day

## 2018-10-15 NOTE — PROGRESS NOTES
Patient ID: Peg Le  is a [de-identified] y o  male  Assessment/Plan:    Parkinson's disease with use of electrical brain stimulation ([facility] [address] )  Main issues today are tremor of left leg, drooling and dysphagia  Over 40 minutes spent with the patient  Deep brain stimulator was interrogated and changes made with dampening of left leg tremor  Increase in voltage in right lead, no change so returned to prior  Increase in Freq to 210, 220 Hz- no change so returned to prior  Increased PW to 150 and tremor dampened  He has daytime sedation which he feels is related to his PD medication  We can try switching ropinirole to ropinirole XL to be given at night to see if this may help reduce daytime sedation and maintain on during the day  Dysphagia  Will obtain video swallow evaluation given reports of coughing while eating and taking pills  Drooling  I recommended a trial of atropine drops  He wishes not to add any medication  Will call should he change his mind  Diagnoses and all orders for this visit:    Parkinson's disease with use of electrical brain stimulation ([facility] [address] )  -     FL barium swallow video w speech; Future  -     rOPINIRole (REQUIP XL) 2 MG 24 hr tablet; Take 1 tablet (2 mg total) by mouth daily at bedtime    Drooling    Dysphagia, unspecified type  -     FL barium swallow video w speech; Future           Subjective:    Mr Bayron Sanchez is an [de-identified]year old man with Parkinson's disease diagnosed in 2005 s/p bilateral VIM DBS Activa PC 3/8/12 and device on 3/15/12 with IPG replacements 5/14/15 and 9/12/15, here for follow up  He was programmed by Terry Wagner from [place] since 2012 and was followed by Dr Pool Collier and presented to me for transfer of care in Oct 2015  To review, initial symptoms were facial masking followed by resting tremors  Symptoms worsened over time with changes in his gait with freezing of gait and drooling  After surgery tremors have been well controlled      He is overall doing okay  He reports having a left leg tremor typically after he takes his metformin  He is drooling all day and carries a towel  He reports coughing when he takes his pills  He cough while eating at times per his daughter in law  He lives with his wife  His daughter in law and wife will help with showering and with dressing  He walks well with the walker and cane  No falls  He denies any issues with swallowing  He sleeps during the day so at night he has some trouble sleeping  He is on Sinemet 6, 12, 4, and Sinemet ER 6-6:30pm , and ropinirole 0 5mg tid, and then he goes to his recliner at 7pm but is up a lot of the night  They feel his medication makes him sleepy which is why he is sleeping all day  He does not feel lightheaded on arising  He denies any constipation or urinary issues  The following portions of the patient's history were reviewed and updated as appropriate: allergies, current medications, past family history, past medical history, past social history and past surgical history  Objective:    Blood pressure 98/60, height 5' 10" (1 778 m), weight 88 5 kg (195 lb)  Physical Exam   Constitutional: He appears well-developed  Eyes: Pupils are equal, round, and reactive to light  EOM are normal    Psychiatric: His speech is normal    Vitals reviewed  Neurological Exam  Mental Status   Oriented to person, place, time and situation  Speech is normal  Follows three-step commands  Attention and concentration are normal     Cranial Nerves  CN II: Visual fields full to confrontation  CN III, IV, VI: Extraocular movements intact bilaterally  Pupils equal round and reactive to light bilaterally  CN V: Facial sensation is normal   CN VII: Full and symmetric facial movement  CN VIII: Hearing is normal   CN IX, X: Palate elevates symmetrically  Normal gag reflex    CN XI: Shoulder shrug strength is normal   CN XII: Tongue midline without atrophy or fasciculations  Motor    Mild amplitude rest tremor RUE 0, LUE 0, RLE 0, LLE 1  Mild action tremor with finger to nose testing on the right  Normal on the left  Mild to moderate bradykinesia on FT 2<2, HG 1,2 , ANEESH 1,2 , HT 1,1  No dyskinesia or dystonia  Mild rigidity bilaterally with activation  Moderate hypomimia  Moderate hypophonia       Sensory  Light touch is normal in upper and lower extremities  Reflexes  Glabellar tap present  Gait Unable to rise from chair without using arms  Decreased stride R>L         ROS:    Review of Systems   Constitutional: Negative  Negative for appetite change and fever  HENT: Positive for drooling, trouble swallowing and voice change  Negative for hearing loss and tinnitus  Eyes: Negative  Negative for photophobia and pain  Respiratory: Positive for cough and choking  Negative for shortness of breath  Cardiovascular: Negative  Negative for palpitations  Gastrointestinal: Negative  Negative for nausea and vomiting  Endocrine: Negative  Negative for cold intolerance and heat intolerance  Genitourinary: Negative  Negative for dysuria, frequency and urgency  Musculoskeletal: Positive for gait problem  Negative for myalgias and neck pain  Skin: Negative  Negative for rash  Neurological: Positive for tremors  Negative for dizziness, seizures, syncope, facial asymmetry, speech difficulty, weakness, light-headedness, numbness and headaches  Hematological: Negative  Does not bruise/bleed easily  Psychiatric/Behavioral: Positive for sleep disturbance  Negative for confusion and hallucinations                                      Time since last dose:      Speech  1    Facial Expression  3    Rigidity - Neck  0    Rigidity - Upper Extremity (Right)  2    Rigidity - Upper Extremity (Left)   2    Rigidity - Lower Extremity (Right)  0    Rigidity - Lower Extremity (Left)   0    Finger Taps (Right)   3    Finger Taps (Left)   3    Hand Movement (Right)  2    Hand Movement (Left)   2    Pronation/Supination (Right)  2    Pronation/Supination (Left)   2    Toe Tapping (Right) 0    Toe Tapping (Left) 0    Leg Agility (Right)  0    Leg Agility (Left)   0    Arising from Chair   2    Gait   1    Freezing of Gait 0    Postural Stability   0    Posture 1    Global spontaneity of movement 2    Postural Tremor (Right) 0    Postural Tremor (Left) 0    Kinetic Tremor (Right)  0    Kinetic Tremor (Left)  0    Rest tremor amplitude RUE 00    Rest tremor amplitude LUE 0    Rest tremor amplitude RLE 1    Reset tremor amplitude LLE 2    Lip/Jaw Tremor  0    Consistency of tremor 2    Motor Exam Total:

## 2018-10-30 ENCOUNTER — TELEPHONE (OUTPATIENT)
Dept: NEUROLOGY | Facility: CLINIC | Age: 78
End: 2018-10-30

## 2018-10-30 NOTE — TELEPHONE ENCOUNTER
Pt's dtr in law Whitney Delgadillo called, states that pt feels more tired during the day since switching ropinirole to XL  Pt sleeps during the day  Sleeps at intervals at night  Pt takes his ropinirole XL 2 mg around 6 pm  No other symptoms  Whitney Delgadillo states that when pt was taking Ropinirole, he was already feeling tired  Now he feels more lethargic during the day  Asking if sinemet can make him tired as well?   Pt takes Sinemet  mg, 1 tab @ 6, 12, 4, and Sinemet ER  mg, 1 tab around 6-6:30pm         [phone] Whitney Delgadillo

## 2018-10-31 NOTE — TELEPHONE ENCOUNTER
Pt's DIL Costella Meckel called re: below  Asking if Ropinirole should be taken later time, like 8 pm instead of 6 pm?           [phone]

## 2018-11-01 ENCOUNTER — HOSPITAL ENCOUNTER (OUTPATIENT)
Dept: RADIOLOGY | Facility: HOSPITAL | Age: 78
Discharge: HOME | End: 2018-11-01
Attending: PSYCHIATRY & NEUROLOGY
Payer: COMMERCIAL

## 2018-11-01 DIAGNOSIS — R13.10 DYSPHAGIA, UNSPECIFIED TYPE: ICD-10-CM

## 2018-11-01 DIAGNOSIS — G20 PARKINSON'S DISEASE WITH USE OF ELECTRICAL BRAIN STIMULATION (HCC): ICD-10-CM

## 2018-11-01 PROCEDURE — G8996 SWALLOW CURRENT STATUS: HCPCS

## 2018-11-01 PROCEDURE — 74230 X-RAY XM SWLNG FUNCJ C+: CPT

## 2018-11-01 PROCEDURE — 92611 MOTION FLUOROSCOPY/SWALLOW: CPT

## 2018-11-01 PROCEDURE — G8997 SWALLOW GOAL STATUS: HCPCS

## 2018-11-01 PROCEDURE — G8998 SWALLOW D/C STATUS: HCPCS

## 2018-11-01 NOTE — PROCEDURES
Video Barium Swallow Study       Patient Name: Armani Parikh  Today's Date: 11/1/2018        Past Medical History     Past Medical History:   Diagnosis Date    Arthritis     BPH (benign prostatic hyperplasia)     Diabetes [facility] [address] [facility]     Heart problem     Movement disorder     Parkinson's disease ([facility] [address] [facility]     Stroke ([facility] [address] [facility]         Past Surgical History  Past Surgical History:   Procedure Laterality Date    APPENDECTOMY      CARDIAC DEFIBRILLATOR PLACEMENT      CARDIAC DEFIBRILLATOR PLACEMENT      CHOLECYSTECTOMY      CORONARY ANGIOPLASTY WITH STENT PLACEMENT      DEEP BRAIN STIMULATOR PLACEMENT      EAR SURGERY      GALLBLADDER SURGERY      KNEE SURGERY      SKIN CANCER EXCISION             General Information: This [de-identified] y o  male was seen today on an outpatient basis  He was referred for a video swallow study by his neurologist, Kiana Francis MD, due to recent onset of difficulty swallowing  History was provided by the patient as well as his daughter-in-law Ananda Alexander who was present for the study  For the past few months, the patient has had variable frequency of coughing, choking, and inability to swallow with intake by mouth  The patient and Ananda Alexander disagree regarding the severity and the frequency of his dysphagia: Ananda Alexander states he usually struggles for 20-30 minutes to take pills, unless they are very small, and that he has severe coughing and choking at every meal; the patient states "I can't take pills at all" and "I can only chew if it's soft" but also "I don't cough that much" and "I ate pork chops on Saturday with no problem "  A video swallow study was conducted in order to investigate the complaint of dysphagia       Previous medical history includes the above as well as CVA approximately 3 years ago; the patient and his daughter-in-law deny any dysphagia related to that stroke  Deep brain stimulation was placed in 2012       The study was conducted in lateral view   Textures assessed during this study include nectar-thick liquids, thin liquids, puree (applesauce), and soft solids (small pieces of tami cracker crumbled in barium paste)  Strategies such as a chin tuck or preparatory set were not found to significantly impact the swallow as the patient often presented with significantly delayed initiation of movement (>10 seconds of effort required)         Oral Stage:  Base of tongue retraction was profoundly reduced  Velopharyngeal closure was within normal limits  Nectar-Thick Liquids via Spoon: Intermittent lingual pumping and premature spillage to the valleculae  Nectar-Thick Liquids via Cup: Immediate spillage to the valleculae and over the top of the epiglottis, sometimes reaching the pyriform sinuses  Thin Liquids via Cup: Immediate spillage to the pyriform sinuses upon acceptance  Puree: Brief loss of bolus control during oral manipulation with the bolus spilling over the side of the tongue and into the right buccal sulcus; the patient independently performed a tongue sweep and successfully recovered the bolus  Lingual pumping lasting 18 seconds with the initial swallow  Soft Solids: Mastication grossly within normal limits despite the patient's lack of dentition  Piecemeal oral transit with a total of three transfers and swallows observed          Pharyngeal Stage:  Hyolaryngeal elevation was within normal limits  Epiglottic inversion was sluggish and incomplete during the swallow, although the patient also appears to have a very small epiglottis  This is likely to be due to anatomical variation as the patient denies any history of trauma or surgery involving the throat or neck  Pharyngeal contraction was mildly reduced with all trials  Nectar-Thick Liquids via Spoon: Moderate oral and minimal vallecular and pyriform sinus residuals after the swallow   The patient independently performed an additional swallow to clear these residuals, resulting in trace shallow laryngeal penetration from the pyriform sinuses  After a total of five swallows, there remained trace oral residuals, minimal to moderate vallecular residuals, and moderate pyriform sinus residuals  Nectar-Thick Liquids via Cup: Trace shallow penetration during the swallow, clearing completely with the swallow  Moderate oral, vallecular, and pyriform sinus residuals after the swallow  The patient independently performed a second swallow, which cleared the oral and vallecular residuals, but only mildly reduced the pyriform sinus residuals, which then began to passively spill into the larynx  A cued throat clear and swallow was successful to clear the remaining residuals from the pharynx and larynx  Thin Liquids via Cup: Variable penetration and aspiration observed across trials both during and after the swallow, ranging from shallow trace laryngeal penetration to charmaine aspiration  There was no sensory response to penetration or aspiration  Puree: Moderate oral and severe vallecular residuals after the swallow  The patient made no independent attempt to clear these  Two additional cued swallows pushed the residuals further along the pharynx to the pyriform sinuses, but resulted in little clearance  Over time these residuals began to spill from the pyriform sinuses to the laryngeal vestibule, eventually resulting in deep penetration to the vocal folds and cough  After a total of eight swallows, there remained minimal oral residuals, minimal to moderate vallecular residuals, and moderate-severe pyriform sinus residuals, as well as coating of the anterior laryngeal wall  Soft Solids: Shallow penetration during the swallow  Increasing vallecular and pyriform sinus residuals over successive swallows, with eventual passive laryngeal penetration  After four swallows, there remained moderate vallecular and pyriform sinus residuals  A liquid wash of a small amount of nectar-thick liquid was useful to clear residuals  Esophageal Stage:  Not formally assessed  Upper esophageal sphincter opening intermittently appeared to be reduced in diameter, which may be contributing to the patient's residuals  Assessment Summary:  The patient presents with moderate oral and pharyngeal dysphagia characterized by: profoundly impaired lingual retraction; frequent post-swallow accumulation of residuals; silent aspiration during and after the swallow with thin liquids; and passive laryngeal penetration of residuals after the swallow with other textures  Passive laryngeal penetration to the vocal folds was observed with puree and did result in an immediate cough  Strongly suspect that the patient has reduced oropharyngeal sensation  Incidental findings included intermittent reduced opening of the upper esophageal sphincter         Recommendations:  1  Soft/moist solid diet with nectar-thick liquids  2  SMALL bites and sips  3  After every 2-3 swallows of food or liquid, cough or clear the throat, and swallow an additional time  This will help clear residuals from the throat  4  Medications crushed and mixed with puree (pudding, applesauce, yogurt, etc)  Very small pills can be taken one at a time in a small spoonful of puree  5  Consider outpatient or home health speech therapy treatment for dysphagia, with potential treatment targets to include pharyngeal strength, lingual strength and coordination, volitional cough/clear as described above, and strategies for safer intake  6  Consider gastroenterology consult to investigate esophageal function            Results and recommendations were discussed in detail with the patient and his daughter-in-law Ananda Alexander immediately following the study         Erick Matthews MA, [id number] [facility]  Speech-Language Pathologist

## 2018-11-02 NOTE — TELEPHONE ENCOUNTER
They can try giving at 8pm to see if that makes a difference  Sinemet can cause sleepiness in some patients

## 2018-11-02 NOTE — TELEPHONE ENCOUNTER
Called and advised pt's dtr in law Vikki Rattler of all of the below  She verbalized clear understanding of all instructions

## 2018-11-19 ENCOUNTER — TELEPHONE (OUTPATIENT)
Dept: NEUROLOGY | Facility: CLINIC | Age: 78
End: 2018-11-19

## 2018-11-19 NOTE — TELEPHONE ENCOUNTER
Rolanda Klein with Shalom Meredith @ [facility]  [phone]    She states the patient wants to go to bed at 6:30 pm every night and they are having difficulty getting all of sinemet doses in within this time frame  She is questioning what times the patient can have his sinemet doses if he is going to bed this early  How many hours need to be in between daily doses and night time dose? Please advise

## 2018-11-20 NOTE — TELEPHONE ENCOUNTER
Jyothi Cochran called back with update  She gave more information stating that the regular sinemet he takes at 7am, 11am, and 3-4am  The night time CR dose he sometimes forgets to take before bed at 6:30  She is asking how soon after taking the regular sinemet can pt take the CR? Call was disconnected   When trying to call Jyothi Cochran back I kept getting the message "call can not be completed at this time"

## 2018-11-21 NOTE — TELEPHONE ENCOUNTER
Ignacio Stantoney at home called back stated that the family was concered about the timing of his medication because he has been going to bed earlier  They have been giving it at 6am 12 noon and 4pm, and ER at 7pm  He was been falling asleep prior to 7  She advised that they administer it at 6am, 10am and 2pm, and the ER at 6pm when he goes to bed , questioning if this is ok?     [phone]

## 2018-12-04 ENCOUNTER — HOSPITAL ENCOUNTER (INPATIENT)
Facility: HOSPITAL | Age: 78
LOS: 1 days | Discharge: HOME HEALTH | DRG: 637 | End: 2018-12-06
Attending: EMERGENCY MEDICINE | Admitting: INTERNAL MEDICINE
Payer: COMMERCIAL

## 2018-12-04 ENCOUNTER — APPOINTMENT (EMERGENCY)
Dept: CT IMAGING | Facility: HOSPITAL | Age: 78
DRG: 637 | End: 2018-12-04
Payer: COMMERCIAL

## 2018-12-04 ENCOUNTER — APPOINTMENT (EMERGENCY)
Dept: RADIOLOGY | Facility: HOSPITAL | Age: 78
DRG: 637 | End: 2018-12-04
Payer: COMMERCIAL

## 2018-12-04 ENCOUNTER — TELEPHONE (OUTPATIENT)
Dept: NEUROLOGY | Facility: CLINIC | Age: 78
End: 2018-12-04

## 2018-12-04 DIAGNOSIS — G20 PARKINSON'S DISEASE WITH USE OF ELECTRICAL BRAIN STIMULATION (HCC): ICD-10-CM

## 2018-12-04 DIAGNOSIS — N20.0 RENAL CALCULI: ICD-10-CM

## 2018-12-04 DIAGNOSIS — E16.2 HYPOGLYCEMIA: Primary | ICD-10-CM

## 2018-12-04 DIAGNOSIS — E11.9 TYPE 2 DIABETES MELLITUS (HCC): ICD-10-CM

## 2018-12-04 DIAGNOSIS — R41.82 ALTERED MENTAL STATE: ICD-10-CM

## 2018-12-04 DIAGNOSIS — R13.10 DYSPHAGIA, UNSPECIFIED TYPE: ICD-10-CM

## 2018-12-04 DIAGNOSIS — G93.41 ACUTE METABOLIC ENCEPHALOPATHY: ICD-10-CM

## 2018-12-04 PROBLEM — R53.1 GENERALIZED WEAKNESS: Status: ACTIVE | Noted: 2018-12-04

## 2018-12-04 PROBLEM — I10 HTN (HYPERTENSION): Status: ACTIVE | Noted: 2018-12-04

## 2018-12-04 PROBLEM — K86.2 PANCREATIC CYST: Status: ACTIVE | Noted: 2018-12-04

## 2018-12-04 PROBLEM — Z86.73 HISTORY OF CVA (CEREBROVASCULAR ACCIDENT): Status: ACTIVE | Noted: 2018-12-04

## 2018-12-04 LAB
ALBUMIN SERPL BCP-MCNC: 3.5 G/DL (ref 3.5–5)
ALP SERPL-CCNC: 84 U/L (ref 46–116)
ALT SERPL W P-5'-P-CCNC: 11 U/L (ref 12–78)
ANION GAP SERPL CALCULATED.3IONS-SCNC: 11 MMOL/L (ref 4–13)
AST SERPL W P-5'-P-CCNC: 32 U/L (ref 5–45)
ATRIAL RATE: 75 BPM
BACTERIA UR QL AUTO: ABNORMAL /HPF
BASOPHILS # BLD AUTO: 0.01 THOUSANDS/ΜL (ref 0–0.1)
BASOPHILS NFR BLD AUTO: 0 % (ref 0–1)
BILIRUB SERPL-MCNC: 2.5 MG/DL (ref 0.2–1)
BILIRUB UR QL STRIP: NEGATIVE
BUN SERPL-MCNC: 25 MG/DL (ref 5–25)
CALCIUM SERPL-MCNC: 9.4 MG/DL (ref 8.3–10.1)
CHLORIDE SERPL-SCNC: 103 MMOL/L (ref 100–108)
CLARITY UR: CLEAR
CO2 SERPL-SCNC: 25 MMOL/L (ref 21–32)
COLOR UR: YELLOW
CREAT SERPL-MCNC: 1.17 MG/DL (ref 0.6–1.3)
EOSINOPHIL # BLD AUTO: 0.09 THOUSAND/ΜL (ref 0–0.61)
EOSINOPHIL NFR BLD AUTO: 2 % (ref 0–6)
ERYTHROCYTE [DISTWIDTH] IN BLOOD BY AUTOMATED COUNT: 12.9 % (ref 11.6–15.1)
FOLATE SERPL-MCNC: 13.3 NG/ML (ref 3.1–17.5)
GFR SERPL CREATININE-BSD FRML MDRD: 59 ML/MIN/1.73SQ M
GLUCOSE SERPL-MCNC: 124 MG/DL (ref 65–140)
GLUCOSE SERPL-MCNC: 145 MG/DL (ref 65–140)
GLUCOSE SERPL-MCNC: 158 MG/DL (ref 65–140)
GLUCOSE SERPL-MCNC: 57 MG/DL (ref 65–140)
GLUCOSE SERPL-MCNC: 65 MG/DL (ref 65–140)
GLUCOSE SERPL-MCNC: 68 MG/DL (ref 65–140)
GLUCOSE SERPL-MCNC: 68 MG/DL (ref 65–140)
GLUCOSE SERPL-MCNC: 90 MG/DL (ref 65–140)
GLUCOSE UR STRIP-MCNC: NEGATIVE MG/DL
HCT VFR BLD AUTO: 39.7 % (ref 36.5–49.3)
HGB BLD-MCNC: 12.8 G/DL (ref 12–17)
HGB UR QL STRIP.AUTO: ABNORMAL
IMM GRANULOCYTES # BLD AUTO: 0.02 THOUSAND/UL (ref 0–0.2)
IMM GRANULOCYTES NFR BLD AUTO: 0 % (ref 0–2)
KETONES UR STRIP-MCNC: NEGATIVE MG/DL
LACTATE SERPL-SCNC: 2.4 MMOL/L (ref 0.5–2)
LACTATE SERPL-SCNC: 3.2 MMOL/L (ref 0.5–2)
LACTATE SERPL-SCNC: 3.7 MMOL/L (ref 0.5–2)
LEUKOCYTE ESTERASE UR QL STRIP: NEGATIVE
LYMPHOCYTES # BLD AUTO: 1.99 THOUSANDS/ΜL (ref 0.6–4.47)
LYMPHOCYTES NFR BLD AUTO: 35 % (ref 14–44)
MAGNESIUM SERPL-MCNC: 1.7 MG/DL (ref 1.6–2.6)
MCH RBC QN AUTO: 31.2 PG (ref 26.8–34.3)
MCHC RBC AUTO-ENTMCNC: 32.2 G/DL (ref 31.4–37.4)
MCV RBC AUTO: 97 FL (ref 82–98)
MONOCYTES # BLD AUTO: 0.62 THOUSAND/ΜL (ref 0.17–1.22)
MONOCYTES NFR BLD AUTO: 11 % (ref 4–12)
NEUTROPHILS # BLD AUTO: 3.03 THOUSANDS/ΜL (ref 1.85–7.62)
NEUTS SEG NFR BLD AUTO: 52 % (ref 43–75)
NITRITE UR QL STRIP: NEGATIVE
NON-SQ EPI CELLS URNS QL MICRO: ABNORMAL /HPF
NRBC BLD AUTO-RTO: 0 /100 WBCS
NT-PROBNP SERPL-MCNC: 299 PG/ML
P AXIS: 76 DEGREES
PH UR STRIP.AUTO: 5.5 [PH] (ref 4.5–8)
PLATELET # BLD AUTO: 174 THOUSANDS/UL (ref 149–390)
PMV BLD AUTO: 9.8 FL (ref 8.9–12.7)
POTASSIUM SERPL-SCNC: 5.2 MMOL/L (ref 3.5–5.3)
PR INTERVAL: 172 MS
PROCALCITONIN SERPL-MCNC: <0.05 NG/ML
PROT SERPL-MCNC: 7.5 G/DL (ref 6.4–8.2)
PROT UR STRIP-MCNC: NEGATIVE MG/DL
QRS AXIS: -71 DEGREES
QRSD INTERVAL: 132 MS
QT INTERVAL: 422 MS
QTC INTERVAL: 471 MS
RBC # BLD AUTO: 4.1 MILLION/UL (ref 3.88–5.62)
RBC #/AREA URNS AUTO: ABNORMAL /HPF
SODIUM SERPL-SCNC: 139 MMOL/L (ref 136–145)
SP GR UR STRIP.AUTO: 1.02 (ref 1–1.03)
T WAVE AXIS: -11 DEGREES
T4 FREE SERPL-MCNC: 1.06 NG/DL (ref 0.76–1.46)
TROPONIN I SERPL-MCNC: 0.02 NG/ML
TROPONIN I SERPL-MCNC: 0.02 NG/ML
TROPONIN I SERPL-MCNC: <0.02 NG/ML
TSH SERPL DL<=0.05 MIU/L-ACNC: 6.7 UIU/ML (ref 0.36–3.74)
UROBILINOGEN UR QL STRIP.AUTO: 0.2 E.U./DL
VENTRICULAR RATE: 75 BPM
VIT B12 SERPL-MCNC: 613 PG/ML (ref 100–900)
WBC # BLD AUTO: 5.76 THOUSAND/UL (ref 4.31–10.16)
WBC #/AREA URNS AUTO: ABNORMAL /HPF

## 2018-12-04 PROCEDURE — 83735 ASSAY OF MAGNESIUM: CPT | Performed by: EMERGENCY MEDICINE

## 2018-12-04 PROCEDURE — 74177 CT ABD & PELVIS W/CONTRAST: CPT

## 2018-12-04 PROCEDURE — 94760 N-INVAS EAR/PLS OXIMETRY 1: CPT

## 2018-12-04 PROCEDURE — 84439 ASSAY OF FREE THYROXINE: CPT | Performed by: NURSE PRACTITIONER

## 2018-12-04 PROCEDURE — 93005 ELECTROCARDIOGRAM TRACING: CPT

## 2018-12-04 PROCEDURE — 84484 ASSAY OF TROPONIN QUANT: CPT | Performed by: NURSE PRACTITIONER

## 2018-12-04 PROCEDURE — 81001 URINALYSIS AUTO W/SCOPE: CPT | Performed by: EMERGENCY MEDICINE

## 2018-12-04 PROCEDURE — 80053 COMPREHEN METABOLIC PANEL: CPT | Performed by: EMERGENCY MEDICINE

## 2018-12-04 PROCEDURE — 83605 ASSAY OF LACTIC ACID: CPT | Performed by: NURSE PRACTITIONER

## 2018-12-04 PROCEDURE — 85025 COMPLETE CBC W/AUTO DIFF WBC: CPT | Performed by: EMERGENCY MEDICINE

## 2018-12-04 PROCEDURE — 84145 PROCALCITONIN (PCT): CPT | Performed by: NURSE PRACTITIONER

## 2018-12-04 PROCEDURE — 96374 THER/PROPH/DIAG INJ IV PUSH: CPT

## 2018-12-04 PROCEDURE — 93010 ELECTROCARDIOGRAM REPORT: CPT | Performed by: INTERNAL MEDICINE

## 2018-12-04 PROCEDURE — 82948 REAGENT STRIP/BLOOD GLUCOSE: CPT

## 2018-12-04 PROCEDURE — 99285 EMERGENCY DEPT VISIT HI MDM: CPT

## 2018-12-04 PROCEDURE — 84484 ASSAY OF TROPONIN QUANT: CPT | Performed by: EMERGENCY MEDICINE

## 2018-12-04 PROCEDURE — 82607 VITAMIN B-12: CPT | Performed by: NURSE PRACTITIONER

## 2018-12-04 PROCEDURE — 94640 AIRWAY INHALATION TREATMENT: CPT

## 2018-12-04 PROCEDURE — 84443 ASSAY THYROID STIM HORMONE: CPT | Performed by: NURSE PRACTITIONER

## 2018-12-04 PROCEDURE — 94664 DEMO&/EVAL PT USE INHALER: CPT

## 2018-12-04 PROCEDURE — 36415 COLL VENOUS BLD VENIPUNCTURE: CPT | Performed by: EMERGENCY MEDICINE

## 2018-12-04 PROCEDURE — 82746 ASSAY OF FOLIC ACID SERUM: CPT | Performed by: NURSE PRACTITIONER

## 2018-12-04 PROCEDURE — 99220 PR INITIAL OBSERVATION CARE/DAY 70 MINUTES: CPT | Performed by: INTERNAL MEDICINE

## 2018-12-04 PROCEDURE — 83605 ASSAY OF LACTIC ACID: CPT | Performed by: EMERGENCY MEDICINE

## 2018-12-04 PROCEDURE — 70450 CT HEAD/BRAIN W/O DYE: CPT

## 2018-12-04 PROCEDURE — 83880 ASSAY OF NATRIURETIC PEPTIDE: CPT | Performed by: EMERGENCY MEDICINE

## 2018-12-04 PROCEDURE — 87086 URINE CULTURE/COLONY COUNT: CPT | Performed by: NURSE PRACTITIONER

## 2018-12-04 PROCEDURE — 71046 X-RAY EXAM CHEST 2 VIEWS: CPT

## 2018-12-04 RX ORDER — DEXTROSE AND SODIUM CHLORIDE 5; .9 G/100ML; G/100ML
75 INJECTION, SOLUTION INTRAVENOUS CONTINUOUS
Status: DISCONTINUED | OUTPATIENT
Start: 2018-12-04 | End: 2018-12-04

## 2018-12-04 RX ORDER — DEXTROSE MONOHYDRATE 25 G/50ML
25 INJECTION, SOLUTION INTRAVENOUS ONCE
Status: COMPLETED | OUTPATIENT
Start: 2018-12-04 | End: 2018-12-04

## 2018-12-04 RX ORDER — DEXTROSE MONOHYDRATE 100 MG/ML
75 INJECTION, SOLUTION INTRAVENOUS CONTINUOUS
Status: DISCONTINUED | OUTPATIENT
Start: 2018-12-04 | End: 2018-12-06

## 2018-12-04 RX ORDER — CARBIDOPA AND LEVODOPA 50; 200 MG/1; MG/1
1 TABLET, EXTENDED RELEASE ORAL
Status: DISCONTINUED | OUTPATIENT
Start: 2018-12-04 | End: 2018-12-05

## 2018-12-04 RX ORDER — HYDROCHLOROTHIAZIDE 25 MG/1
25 TABLET ORAL DAILY
COMMUNITY
End: 2019-02-26 | Stop reason: HOSPADM

## 2018-12-04 RX ORDER — DEXTROSE MONOHYDRATE 25 G/50ML
INJECTION, SOLUTION INTRAVENOUS
Status: COMPLETED
Start: 2018-12-04 | End: 2018-12-04

## 2018-12-04 RX ORDER — ACETAMINOPHEN 325 MG/1
650 TABLET ORAL EVERY 6 HOURS PRN
Status: DISCONTINUED | OUTPATIENT
Start: 2018-12-04 | End: 2018-12-06 | Stop reason: HOSPADM

## 2018-12-04 RX ORDER — IPRATROPIUM BROMIDE AND ALBUTEROL SULFATE 2.5; .5 MG/3ML; MG/3ML
3 SOLUTION RESPIRATORY (INHALATION)
Status: DISCONTINUED | OUTPATIENT
Start: 2018-12-04 | End: 2018-12-05

## 2018-12-04 RX ORDER — ASPIRIN 81 MG/1
81 TABLET, CHEWABLE ORAL EVERY EVENING
Status: DISCONTINUED | OUTPATIENT
Start: 2018-12-04 | End: 2018-12-06 | Stop reason: HOSPADM

## 2018-12-04 RX ORDER — ATORVASTATIN CALCIUM 40 MG/1
80 TABLET, FILM COATED ORAL EVERY EVENING
Status: DISCONTINUED | OUTPATIENT
Start: 2018-12-04 | End: 2018-12-06 | Stop reason: HOSPADM

## 2018-12-04 RX ADMIN — DEXTROSE MONOHYDRATE 25 ML: 25 INJECTION, SOLUTION INTRAVENOUS at 17:11

## 2018-12-04 RX ADMIN — IOHEXOL 100 ML: 350 INJECTION, SOLUTION INTRAVENOUS at 11:07

## 2018-12-04 RX ADMIN — IPRATROPIUM BROMIDE AND ALBUTEROL SULFATE 3 ML: .5; 3 SOLUTION RESPIRATORY (INHALATION) at 17:10

## 2018-12-04 RX ADMIN — DEXTROSE MONOHYDRATE 25 ML: 500 INJECTION PARENTERAL at 17:11

## 2018-12-04 RX ADMIN — DEXTROSE 75 ML/HR: 10 SOLUTION INTRAVENOUS at 17:14

## 2018-12-04 RX ADMIN — CEFTRIAXONE SODIUM 1000 MG: 10 INJECTION, POWDER, FOR SOLUTION INTRAVENOUS at 17:25

## 2018-12-04 RX ADMIN — DEXTROSE MONOHYDRATE 25 ML: 25 INJECTION, SOLUTION INTRAVENOUS at 12:20

## 2018-12-04 RX ADMIN — SODIUM CHLORIDE 1000 ML: 0.9 INJECTION, SOLUTION INTRAVENOUS at 14:24

## 2018-12-04 RX ADMIN — DEXTROSE AND SODIUM CHLORIDE 75 ML/HR: 5; .9 INJECTION, SOLUTION INTRAVENOUS at 13:30

## 2018-12-04 RX ADMIN — IPRATROPIUM BROMIDE AND ALBUTEROL SULFATE 3 ML: .5; 3 SOLUTION RESPIRATORY (INHALATION) at 21:07

## 2018-12-04 NOTE — PLAN OF CARE
Problem: PAIN - ADULT  Goal: Verbalizes/displays adequate comfort level or baseline comfort level  Interventions:  - Encourage patient to monitor pain and request assistance  - Assess pain using appropriate pain scale  - Administer analgesics based on type and severity of pain and evaluate response  - Implement non-pharmacological measures as appropriate and evaluate response  - Consider cultural and social influences on pain and pain management  - Notify physician/advanced practitioner if interventions unsuccessful or patient reports new pain  Outcome: Progressing      Problem: INFECTION - ADULT  Goal: Absence or prevention of progression during hospitalization  INTERVENTIONS:  - Assess and monitor for signs and symptoms of infection  - Monitor lab/diagnostic results  - Monitor all insertion sites, i e  indwelling lines, tubes, and drains  - Monitor endotracheal (as able) and nasal secretions for changes in amount and color  - [place] appropriate cooling/warming therapies per order  - Administer medications as ordered  - Instruct and encourage patient and family to use good hand hygiene technique  - Identify and instruct in appropriate isolation precautions for identified infection/condition  Outcome: Progressing    Goal: Absence of fever/infection during neutropenic period  INTERVENTIONS:  - Monitor WBC  - Implement neutropenic guidelines  Outcome: Progressing      Problem: SAFETY ADULT  Goal: Maintain or return to baseline ADL function  INTERVENTIONS:  -  Assess patient's ability to carry out ADLs; assess patient's baseline for ADL function and identify physical deficits which impact ability to perform ADLs (bathing, care of mouth/teeth, toileting, grooming, dressing, etc )  - Assess/evaluate cause of self-care deficits   - Assess range of motion  - Assess patient's mobility; develop plan if impaired  - Assess patient's need for assistive devices and provide as appropriate  - Encourage maximum independence but intervene and supervise when necessary  ¯ Involve family in performance of ADLs  ¯ Assess for home care needs following discharge   ¯ Request OT consult to assist with ADL evaluation and planning for discharge  ¯ Provide patient education as appropriate  Outcome: Progressing    Goal: Maintain or return mobility status to optimal level  INTERVENTIONS:  - Assess patient's baseline mobility status (ambulation, transfers, stairs, etc )    - Identify cognitive and physical deficits and behaviors that affect mobility  - Identify mobility aids required to assist with transfers and/or ambulation (gait belt, sit-to-stand, lift, walker, cane, etc )  - [place] fall precautions as indicated by assessment  - Record patient progress and toleration of activity level on Mobility SBAR; progress patient to next Phase/Stage  - Instruct patient to call for assistance with activity based on assessment  - Request Rehabilitation consult to assist with strengthening/weightbearing, etc   Outcome: Progressing      Problem: DISCHARGE PLANNING  Goal: Discharge to home or other facility with appropriate resources  INTERVENTIONS:  - Identify barriers to discharge w/patient and caregiver  - Arrange for needed discharge resources and transportation as appropriate  - Identify discharge learning needs (meds, wound care, etc )  - Arrange for interpretive services to assist at discharge as needed  - Refer to Case Management Department for coordinating discharge planning if the patient needs post-hospital services based on physician/advanced practitioner order or complex needs related to functional status, cognitive ability, or social support system  Outcome: Progressing      Problem: Knowledge Deficit  Goal: Patient/family/caregiver demonstrates understanding of disease process, treatment plan, medications, and discharge instructions  Complete learning assessment and assess knowledge base    Interventions:  - Provide teaching at level of understanding  - Provide teaching via preferred learning methods  Outcome: Progressing

## 2018-12-04 NOTE — ASSESSMENT & PLAN NOTE
In light of elevated lactic acid, hold hydrochlorothiazide  In light of hypoglycemia, hold metoprolol for today and resume tomorrow  Continue lisinopril starting tomorrow  Monitor BP and adjust accordingly

## 2018-12-04 NOTE — ASSESSMENT & PLAN NOTE
Referred for video swallow study by his neurologist due to recent onset of difficulty swallowing  VBS done 11/1/2018:  Soft/moist solid diet with nectar thick liquids  Small bites and sips  Medications crushed and mixed with puree  Speech therapy for dysphagia  Consider Gastroenterology consult to investigate esophageal function    · NPO until mentation improves  · Speech therapy  · Consider need for gastroenterology consultation

## 2018-12-04 NOTE — H&P
H&P- Yara Diamond  1940, [de-identified] y o  male MRN: [id number]    Unit/Bed#: ED 25 Encounter: [id number]    Primary Care Provider: Yolanda Jiménez MD   Date and time admitted to hospital: 12/4/2018  9:15 AM        Acute metabolic encephalopathy   Assessment & Plan    Likely due to dehydration and hypoglycemia 2/2 poor oral intake for the last 3 days with hypoglycemic medication use  Completed course of ciprofloxacin for UTI on 11/17/2018  Concern for resistant UTI  Concern for acute CVA  ACS ruled out  CT head: No acute intracranial abnormality  Microangiopathic changes  Deep brain stimulator noted  Chest x-ray: No acute cardiopulmonary disease  Pacemaker leads intact  CT abdomen pelvis:  No signs of infection  Lactic acid elevated, likely due to dehydration, possible UTI  · Follow up urine culture  · Start Rocephin 1 g IV daily and check procalcitonin   · Repeat lactic acid until less than 2  · Hold home hypoglycemic medications, Metformin and Lantus  · Start D10 infusion  · Monitor Accu-Cheks AC and HS  · NPO  · Continue Parkinson's medication, Sinemet  Hold Requip  · Supportive care, up with assist  · Spoke with on-call neurologist, Dr Altagracia Olson, who recommends plain head CT, serial exams, and infectious workup  Consult Neurology tomorrow if no improvement  · If no improvement, consider CTA head and neck to rule out acute CVA as patient unable to obtain MRI brain     Generalized weakness   Assessment & Plan    Acute on chronic  Likely due to dehydration and hypoglycemia in the setting of recent UTI  Patient recently completed a 10 day course of ciprofloxacin for UTI on 11/17/18  Concern for resistance  · Follow-up pending urine culture  · Start Rocephin IV daily  · IV hydration  · Supportive care, fall precautions  · PT/OT eval and treat     Dysphagia   Assessment & Plan    Referred for video swallow study by his neurologist due to recent onset of difficulty swallowing    VBS done 11/1/2018: Soft/moist solid diet with nectar thick liquids  Small bites and sips  Medications crushed and mixed with puree  Speech therapy for dysphagia  Consider Gastroenterology consult to investigate esophageal function  · NPO until mentation improves  · Speech therapy  · Consider need for gastroenterology consultation     Parkinson's disease with use of electrical brain stimulation [facility])   Assessment & Plan    Status post deep brain stimulator  Follows up with outpatient Neurology  · Continue home medication, Sinemet   · Hold Requip  · PT/OT eval and treat     History of CVA (cerebrovascular accident)   Assessment & Plan    Per daughter  No records  No mention of prior CVA and outpatient neurology notes  · Continue aspirin, statin     HTN (hypertension)   Assessment & Plan    In light of elevated lactic acid, hold hydrochlorothiazide  In light of hypoglycemia, hold metoprolol for today and resume tomorrow  Continue lisinopril starting tomorrow  Monitor BP and adjust accordingly     Type 2 diabetes mellitus [facility])   Assessment & Plan    Lab Results   Component Value Date    HGBA1C 6 9 (H) 09/19/2015       Recent Labs      12/04/18   0955  12/04/18   1038  12/04/18   1217  12/04/18   1247   POCGLU  68  90  57*  124       Blood Sugar Average: Last 72 hrs:  (P) 84 75   Family noted the patient to be fatigue for the last 3 days  Morning blood sugar was 80  Family states he has never below 110  Patient taking metformin 1000 mg BID and Lantus 32 units at HS despite poor oral intake  Blood sugar during ED workup, 57  Improved with 1/2 amp D50  Start D10 infusion, Accu-Cheks AC + HS, Lispro insulin sliding scale     Pancreatic cyst   Assessment & Plan    Incidental finding on CT abdomen pelvis: 'Multiple pancreatic cysts  The largest measures up to 2 cm  For simple cyst(s) 2 cm or recommend gastroenterology and/or surgical oncology consult   EUS is likely now warranted '  · Outpatient GI follow-up      Drooling Assessment & Plan    Chronic and at baseline  Follows up with outpatient Neurology  Per outpatient records patient is not interested in any treatment, medication, or neuro toxin injection at this time  VTE Prophylaxis: Enoxaparin (Lovenox)  / sequential compression device   Code Status:  DNR DNI level 3  POLST: POLST form is not discussed and not completed at this time  Discussion with family:  Daughter Kaelyn Hamilton via telephone    Anticipated Length of Stay:  Patient will be admitted on an Observation basis with an anticipated length of stay of  Less than 2 midnights  Justification for Hospital Stay: hypoglycemia, generalized weakness, acute metabolic encephalopathy    Total Time for Visit, including Counseling / Coordination of Care: 1 hour  Greater than 50% of this total time spent on direct patient counseling and coordination of care  Chief Complaint:   Generalized weakness    History of Present Illness:    Cameron Carcamo  is a [de-identified] y o  male with a past medical history of Parkinson's disease diagnosed in 2005 s/p deep brain stimulator, drooling, dysphagia, prior CVA, HTN, HLD, and T2DM who presents with generalized weakness for the last 3 days associated with hypoglycemia this morning  Patient is a very poor historian due to baseline Parkinson's disease and dementia  Patient's daughter noted that the patient began with generalized weakness on Saturday, appearing off    Patient slept most of Sunday add continued to be weak on Monday into Tuesday morning  On Tuesday morning, daughter checked his blood sugar and noted it to be 80  She states this is low for him as he is never below 110  She decided to seek medical attention this morning due to hypoglycemia and persistent altered mental status  Of note, patient recently completed a 10 day course of ciprofloxacin on 11/17/2018  Urine culture on available to us at this time    Spoke with on-call neurologist, Dr Frank Taylor, who recommended plain CT head with serial exams  If there is no improvement in his mentation after infectious workup, consult Neurology tomorrow  Patient declines dizziness, lightheadedness, chest tightness, shortness of breath, abdominal pain, nausea, vomiting, diarrhea  Review of Systems:    Review of Systems   Constitutional: Positive for appetite change and fatigue  Negative for chills and fever  HENT: Negative for congestion, postnasal drip, rhinorrhea and sore throat  Eyes: Negative for photophobia and visual disturbance  Respiratory: Negative for cough, chest tightness, shortness of breath and wheezing  Cardiovascular: Negative for chest pain, palpitations and leg swelling  Gastrointestinal: Negative for abdominal distention, abdominal pain, constipation, diarrhea, nausea and vomiting  Genitourinary: Negative for difficulty urinating, dysuria and hematuria  Musculoskeletal: Positive for gait problem  Negative for arthralgias and myalgias  Skin: Negative for rash and wound  Neurological: Positive for speech difficulty (Chronic) and weakness  Negative for dizziness, light-headedness, numbness and headaches  Psychiatric/Behavioral: Negative for confusion  The patient is not nervous/anxious  Past Medical and Surgical History:     Past Medical History:   Diagnosis Date    Arthritis     BPH (benign prostatic hyperplasia)     [facility] [address] [facility]     Heart problem     Movement disorder     [facility]     [facility]        Past Surgical History:   Procedure Laterality Date    APPENDECTOMY      CARDIAC DEFIBRILLATOR PLACEMENT      CARDIAC DEFIBRILLATOR PLACEMENT      CHOLECYSTECTOMY      CORONARY ANGIOPLASTY WITH STENT PLACEMENT      DEEP BRAIN STIMULATOR PLACEMENT      EAR SURGERY      GALLBLADDER SURGERY      KNEE SURGERY      SKIN CANCER EXCISION         Meds/Allergies:    Prior to Admission medications    Medication Sig Start Date End Date Taking?  Authorizing Provider   aspirin 81 MG tablet Take 1 tablet by mouth every evening     Yes Historical Provider, MD   atorvastatin (LIPITOR) 80 mg tablet TAKE 1 TABLET BY MOUTH EVERY EVENING 8/24/17  Yes Historical Provider, MD   carbidopa-levodopa (SINEMET CR)  mg per tablet Take 1 tablet by mouth daily at bedtime 4/12/18  Yes Marah Tyler PA-C   carbidopa-levodopa (SINEMET)  mg per tablet Take 1 tablet by mouth 3 (three) times a day  Patient taking differently: Take 1 tablet by mouth 3 (three) times a day 0600; 1000; 1400  4/12/18  Yes Marah Tyler PA-C   Cyanocobalamin (VITAMIN B 12 PO) Take by mouth   Yes Historical Provider, MD   hydrochlorothiazide (HYDRODIURIL) 25 mg tablet Take 25 mg by mouth daily   Yes Historical Provider, MD JUAN R GONZÁLESAR injection pen 100 units/mL Inject 32 Units under the skin daily at bedtime   3/17/18  Yes Historical Provider, MD   lisinopril (ZESTRIL) 2 5 mg tablet Take 0 5 tablets by mouth daily   Yes Historical Provider, MD   metFORMIN (GLUCOPHAGE-XR) 500 mg 24 hr tablet Take 1,000 mg by mouth 2 (two) times a day 2/21/18  Yes Historical Provider, MD   metoprolol tartrate (LOPRESSOR) 25 mg tablet Take 12 5 mg by mouth 2 (two) times a day 3/30/18  Yes Historical Provider, MD   rOPINIRole (REQUIP XL) 2 MG 24 hr tablet Take 1 tablet (2 mg total) by mouth daily at bedtime 10/15/18  Yes Luis Mena MD     I have reviewed home medications with patient family member  Allergies:    Allergies   Allergen Reactions    Clopidogrel Rash     PLAVIX       Social History:     Marital Status: /Civil Union   Occupation:  Retired  Patient Pre-hospital Living Situation:  Home with family has a caregiver Monday through Friday and every other Saturday  Patient Pre-hospital Level of Mobility:  Uses a walker for short distances and a wheelchair for long distances  Patient Pre-hospital Diet Restrictions:  Soft, moist foods, nectar thick liquids  Substance Use History:   History   Alcohol Use No     History Smoking Status    Former Smoker   Smokeless Tobacco    Never Used     History   Drug Use No       Family History:    Family History   Problem Relation Age of Onset    Cancer Mother     Diabetes Father     Parkinsonism Father     Heart attack Father        Physical Exam:     Vitals:   Blood Pressure: 149/78 (12/04/18 1700)  Pulse: 72 (12/04/18 1700)  Temperature: 98 1 °F (36 7 °C) (12/04/18 0949)  Temp Source: Oral (12/04/18 0949)  Respirations: 17 (12/04/18 1700)  Height: 5' 10" (177 8 cm) (12/04/18 0930)  Weight - Scale: 90 kg (198 lb 6 6 oz) (12/04/18 0949)  SpO2: 97 % (12/04/18 1700)    Physical Exam   Constitutional: He appears well-developed  No distress  HENT:   Head: Normocephalic  Mouth/Throat: Mucous membranes are dry  Eyes: Conjunctivae and EOM are normal  Right eye exhibits no discharge  Left eye exhibits no discharge  No scleral icterus  Neck: Normal range of motion  No JVD present  Cardiovascular: Normal rate, regular rhythm and intact distal pulses  Pulmonary/Chest: Effort normal  No respiratory distress  He has decreased breath sounds in the right lower field and the left lower field  He has wheezes in the right lower field and the left lower field  He has no rhonchi  He has no rales  Abdominal: Soft  Bowel sounds are normal  He exhibits no distension  There is no tenderness  Musculoskeletal: He exhibits no edema or tenderness  Bilateral upper extremity weakness, right greater than left  Bilateral lower extremity weakness, 4/5, right greater than left   Neurological: He is alert  Alert to self and place, disoriented to time   Skin: Skin is warm and dry  He is not diaphoretic  Dry flaky skin on scalp  Healing rash to right forehead  Stage I, blanchable ulcer to right buttock   Psychiatric: He has a normal mood and affect  His behavior is normal    Nursing note and vitals reviewed  Additional Data:     Lab Results: I have personally reviewed pertinent reports  Results from last 7 days  Lab Units 12/04/18  0958   WBC Thousand/uL 5 76   HEMOGLOBIN g/dL 12 8   HEMATOCRIT % 39 7   PLATELETS Thousands/uL 174   NEUTROS PCT % 52   LYMPHS PCT % 35   MONOS PCT % 11   EOS PCT % 2       Results from last 7 days  Lab Units 12/04/18  0958   SODIUM mmol/L 139   POTASSIUM mmol/L 5 2   CHLORIDE mmol/L 103   CO2 mmol/L 25   BUN mg/dL 25   CREATININE mg/dL 1 17   ANION GAP mmol/L 11   CALCIUM mg/dL 9 4   ALBUMIN g/dL 3 5   TOTAL BILIRUBIN mg/dL 2 50*   ALK PHOS U/L 84   ALT U/L 11*   AST U/L 32   GLUCOSE RANDOM mg/dL 68           Results from last 7 days  Lab Units 12/04/18  1647 12/04/18  1247 12/04/18  1217 12/04/18  1038 12/04/18  0955   POC GLUCOSE mg/dl 65 124 57* 90 68           Results from last 7 days  Lab Units 12/04/18  1446 12/04/18  1305   LACTIC ACID mmol/L 3 2* 3 7*       Imaging: I have personally reviewed pertinent reports  CT abdomen pelvis with contrast   ED Interpretation by Briseida Torres DO (12/04 1256)   1  There are 2 hyperdensities identified in the right ureter likely nonobstructing stones  No hydronephrosis identified  These measure 2 mm and 3 mm in size  5 mm intrarenal stone also noted        2   Bilateral renal atrophy        3   Multiple pancreatic cysts  The largest measures up to 2 cm  For simple cyst(s) 2 cm or recommend gastroenterology and/or surgical oncology consult  EUS is likely now warranted  Considerations related to the patient's age and/or comorbidities may be    used to alter these recommendations         4   Colonic diverticulosis  No evidence for acute or radiculitis  Final Result by Asmita Durham DO (12/04 1217)      1  There are 2 hyperdensities identified in the right ureter likely nonobstructing stones  No hydronephrosis identified  These measure 2 mm and 3 mm in size  5 mm intrarenal stone also noted  2   Bilateral renal atrophy  3   Multiple pancreatic cysts   The largest measures up to 2 cm  For simple cyst(s) 2 cm or recommend gastroenterology and/or surgical oncology consult  EUS is likely now warranted  Considerations related to the patient's age and/or comorbidities may be    used to alter these recommendations  4   Colonic diverticulosis  No evidence for acute or radiculitis  Workstation performed: [id number]         X-ray chest 2 views   ED Interpretation by George Wilder DO (12/04 1043)   No acute cardiopulmonary disease  Final Result by Indra Rollins MD (12/04 1004)      No acute cardiopulmonary disease  Workstation performed: [id number]         CT head without contrast   ED Interpretation by George Widler DO (12/04 1841)   No acute intracranial abnormality  Microangiopathic changes  Final Result by Adams Boxer, MD (12/04 2076)      No acute intracranial abnormality  Microangiopathic changes  Workstation performed: [id number]             EKG, Pathology, and Other Studies Reviewed on Admission:   · EKG:  Sinus rhythm,     Allscripts / Epic Records Reviewed: Yes     ** Please Note: This note has been constructed using a voice recognition system   **

## 2018-12-04 NOTE — ASSESSMENT & PLAN NOTE
Acute on chronic  Likely due to dehydration and hypoglycemia in the setting of recent UTI  Patient recently completed a 10 day course of ciprofloxacin for UTI on 11/17/18  Concern for resistance    · Follow-up pending urine culture  · Start Rocephin IV daily  · IV hydration  · Supportive care, fall precautions  · PT/OT eval and treat

## 2018-12-04 NOTE — ED PROVIDER NOTES
History  Chief Complaint   Patient presents with    Weakness - Generalized     Pt and family state patient has been sleeping more than usual since Sunday  Pt denies any other complaints aside from "being wiped"  Daughter in law states pt has hx of UTI and this is how he usually presents with UTI's     25-year-old male presents for evaluation of altered mental status  Most of the history comes from family as patient is quite fatigued and has difficulty responding to history questions  Family states that patient has been quite fatigued since Sunday (3 days ago)  He has been sleeping a lot, very low energy  No acute focal neurologic deficit is noted  Patient has a history of Parkinson's disease and diabetes, he has a deep brain stimulator in place  He has no changes to his medications lately  When patient presents to the emergency department his blood glucose was 65  After being status blood glucose is only 124, then drops back down low again to 57  Family denies change in patient's medications  Denies history of similar in the past with difficulty controlling sugar  Family states the patient does act this way with urinary tract infection however he does not have any urinary tract infection symptoms  No fevers, chills, no abdominal pain, no cough, no chest pain, no nausea or vomiting, no other complaints at this time   Prior to Admission Medications   Prescriptions Last Dose Informant Patient Reported? Taking?    Cyanocobalamin (VITAMIN B 12 PO)   Yes Yes   Sig: Take by mouth   LANTUS SOLOSTAR injection pen 100 units/mL   Yes Yes   Sig: Inject 32 Units under the skin daily at bedtime     aspirin 81 MG tablet   Yes Yes   Sig: Take 1 tablet by mouth every evening     atorvastatin (LIPITOR) 80 mg tablet   Yes Yes   Sig: TAKE 1 TABLET BY MOUTH EVERY EVENING   carbidopa-levodopa (SINEMET CR)  mg per tablet   No Yes   Sig: Take 1 tablet by mouth daily at bedtime   carbidopa-levodopa (SINEMET)  mg per tablet   No Yes   Sig: Take 1 tablet by mouth 3 (three) times a day   Patient taking differently: Take 1 tablet by mouth 3 (three) times a day 0600; 1000; 1400    hydrochlorothiazide (HYDRODIURIL) 25 mg tablet   Yes Yes   Sig: Take 25 mg by mouth daily   lisinopril (ZESTRIL) 2 5 mg tablet   Yes Yes   Sig: Take 0 5 tablets by mouth daily   metFORMIN (GLUCOPHAGE-XR) 500 mg 24 hr tablet   Yes Yes   Sig: Take 1,000 mg by mouth 2 (two) times a day   metoprolol tartrate (LOPRESSOR) 25 mg tablet   Yes Yes   Sig: Take 12 5 mg by mouth 2 (two) times a day   rOPINIRole (REQUIP XL) 2 MG 24 hr tablet   No Yes   Sig: Take 1 tablet (2 mg total) by mouth daily at bedtime      Facility-Administered Medications: None       Past Medical History:   Diagnosis Date    Arthritis     BPH (benign prostatic hyperplasia)     [facility]     Heart problem     Movement disorder     [facility]     Stroke ([facility] )        Past Surgical History:   Procedure Laterality Date    APPENDECTOMY      CARDIAC DEFIBRILLATOR PLACEMENT      CARDIAC DEFIBRILLATOR PLACEMENT      CHOLECYSTECTOMY      CORONARY ANGIOPLASTY WITH STENT PLACEMENT      DEEP BRAIN STIMULATOR PLACEMENT      EAR SURGERY      GALLBLADDER SURGERY      KNEE SURGERY      SKIN CANCER EXCISION         Family History   Problem Relation Age of Onset    Cancer Mother     Diabetes Father     Parkinsonism Father     Heart attack Father      I have reviewed and agree with the history as documented  Social History   Substance Use Topics    Smoking status: Former Smoker    Smokeless tobacco: Never Used    Alcohol use No        Review of Systems   Unable to perform ROS: Mental status change   Constitutional: Positive for activity change and appetite change  Negative for fever  Respiratory: Negative for cough and shortness of breath  Cardiovascular: Negative for chest pain     Gastrointestinal: Negative for abdominal distention and abdominal pain    Skin: Positive for wound (Sacral decubitus, known)  Neurological: Negative for facial asymmetry and speech difficulty  Psychiatric/Behavioral: Positive for confusion  Physical Exam  Physical Exam   Constitutional: He is oriented to person, place, and time  He appears well-developed and well-nourished  No distress  HENT:   Head: Normocephalic and atraumatic  Mouth/Throat: Oropharynx is clear and moist    Eyes: Pupils are equal, round, and reactive to light  Conjunctivae and EOM are normal  Right eye exhibits no discharge  Left eye exhibits no discharge  No scleral icterus  Neck: Normal range of motion  Neck supple  No JVD present  Cardiovascular: Normal rate, regular rhythm, normal heart sounds and intact distal pulses  Exam reveals no gallop and no friction rub  No murmur heard  Pulmonary/Chest: Effort normal and breath sounds normal  No respiratory distress  He has no wheezes  He has no rales  He exhibits no tenderness  Abdominal: Soft  Bowel sounds are normal  He exhibits no distension  There is no tenderness  There is no rebound and no guarding  Musculoskeletal: Normal range of motion  He exhibits edema (Mild bilateral)  He exhibits no tenderness or deformity  Neurological: He is oriented to person, place, and time  No cranial nerve deficit or sensory deficit  Arousable but very fatigued  Normal strength in upper lower extremities  No cranial nerve deficit  Normal sensation in upper & lower extremities  Skin: Skin is warm and dry  No rash noted  He is not diaphoretic  No erythema  No pallor  Venous stasis rash bilateral lower extremities   Psychiatric: He has a normal mood and affect  His behavior is normal    Vitals reviewed        Vital Signs  ED Triage Vitals   Temperature Pulse Respirations Blood Pressure SpO2   12/04/18 0949 12/04/18 0930 12/04/18 0930 12/04/18 0930 12/04/18 0930   98 1 °F (36 7 °C) 75 20 138/73 98 %      Temp Source Heart Rate Source Patient Position - Orthostatic VS BP Location FiO2 (%)   12/04/18 0949 12/04/18 0930 12/04/18 0930 12/04/18 0930 --   Oral Monitor Sitting Right arm       Pain Score       12/04/18 0930       No Pain           Vitals:    12/04/18 1130 12/04/18 1200 12/04/18 1300 12/04/18 1400   BP: 134/65 107/58 126/66 159/70   Pulse: 74 73 70 72   Patient Position - Orthostatic VS: Lying Lying  Lying       Visual Acuity  Visual Acuity      Most Recent Value   L Pupil Size (mm)  2   R Pupil Size (mm)  2          ED Medications  Medications   dextrose 5 % and sodium chloride 0 9 % infusion (not administered)   magnesium oxide (MAG-OX) tablet 400 mg (not administered)   sodium chloride 0 9 % bolus 1,000 mL (1,000 mL Intravenous New Bag 12/4/18 1424)   insulin lispro (HumaLOG) 100 units/mL subcutaneous injection 1-5 Units (not administered)   insulin lispro (HumaLOG) 100 units/mL subcutaneous injection 1-5 Units (not administered)   dextrose 50 % IV solution 25 mL (25 mL Intravenous Given 12/4/18 1220)   iohexol (OMNIPAQUE) 350 MG/ML injection (MULTI-DOSE) 100 mL (100 mL Intravenous Given 12/4/18 1107)       Diagnostic Studies  Results Reviewed     Procedure Component Value Units Date/Time    Troponin I [[id number]]  (Normal) Collected:  12/04/18 1446    Lab Status:  Final result Specimen:  Blood from Arm, Right Updated:  12/04/18 1513     Troponin I 0 02 ng/mL     Urine culture [[id number]]     Lab Status:  No result Specimen:  Urine     T4, free [[id number]]     Lab Status:  No result Specimen:  Blood     Vitamin B12 [[id number]] Collected:  12/04/18 1446    Lab Status: In process Specimen:  Blood from Arm, Right Updated:  12/04/18 1451    Folate [[id number]] Collected:  12/04/18 1446    Lab Status: In process Specimen:  Blood from Arm, Right Updated:  12/04/18 1451    Lactic acid, plasma [[id number]] Collected:  12/04/18 1446    Lab Status:   In process Specimen:  Blood from Arm, Right Updated:  12/04/18 1451    Procalcitonin [[id number]] Collected:  12/04/18 1446    Lab Status: In process Specimen:  Blood from Arm, Right Updated:  12/04/18 1451    TSH, 3rd generation [[id number]]  (Abnormal) Collected:  12/04/18 0958    Lab Status:  Final result Specimen:  Blood from Arm, Left Updated:  12/04/18 1357     TSH 3RD GENERATON 6 699 (H) uIU/mL     Narrative:         Patients undergoing fluorescein dye angiography may retain small amounts of fluorescein in the body for 48-72 hours post procedure  Samples containing fluorescein can produce falsely depressed TSH values  If the patient had this procedure,a specimen should be resubmitted post fluorescein clearance  Lactic acid, plasma [[id number]]  (Abnormal) Collected:  12/04/18 1305    Lab Status:  Final result Specimen:  Blood from Arm, Right Updated:  12/04/18 1345     LACTIC ACID 3 7 (HH) mmol/L     Narrative:         Result may be elevated if tourniquet was used during collection      Troponin I [[id number]]     Lab Status:  No result Specimen:  Blood     Fingerstick Glucose (POCT) [[id number]]  (Normal) Collected:  12/04/18 1247    Lab Status:  Final result Updated:  12/04/18 1248     POC Glucose 124 mg/dl     Fingerstick Glucose (POCT) [[id number]]  (Abnormal) Collected:  12/04/18 1217    Lab Status:  Final result Updated:  12/04/18 1218     POC Glucose 57 (L) mg/dl     NT-BNP PRO [[id number]]  (Normal) Collected:  12/04/18 0958    Lab Status:  Final result Specimen:  Blood from Arm, Left Updated:  12/04/18 1126     NT-proBNP 299 pg/mL     Urine Microscopic [[id number]]  (Abnormal) Collected:  12/04/18 1028    Lab Status:  Final result Specimen:  Urine from Urine, Clean Catch Updated:  12/04/18 1045     RBC, UA 1-2 (A) /hpf      WBC, UA 1-2 (A) /hpf      Epithelial Cells Occasional /hpf      Bacteria, UA None Seen /hpf     Fingerstick Glucose (POCT) [[id number]]  (Normal) Collected:  12/04/18 1038    Lab Status:  Final result Updated:  12/04/18 1039     POC Glucose 90 mg/dl     UA w Reflex to Microscopic w Reflex to Culture [[id number]]  (Abnormal) Collected:  12/04/18 1028    Lab Status:  Final result Specimen:  Urine from Urine, Clean Catch Updated:  12/04/18 1035     Color, UA Yellow     Clarity, UA Clear     Specific Gravity, UA 1 025     pH, UA 5 5     Leukocytes, UA Negative     Nitrite, UA Negative     Protein, UA Negative mg/dl      Glucose, UA Negative mg/dl      Ketones, UA Negative mg/dl      Urobilinogen, UA 0 2 E U /dl      Bilirubin, UA Negative     Blood, UA Trace-Intact (A)    Magnesium [[id number]]  (Normal) Collected:  12/04/18 0958    Lab Status:  Final result Specimen:  Blood from Arm, Left Updated:  12/04/18 1031     Magnesium 1 7 mg/dL     Comprehensive metabolic panel [[id number]]  (Abnormal) Collected:  12/04/18 0958    Lab Status:  Final result Specimen:  Blood from Arm, Left Updated:  12/04/18 1031     Sodium 139 mmol/L      Potassium 5 2 mmol/L      Chloride 103 mmol/L      CO2 25 mmol/L      ANION GAP 11 mmol/L      BUN 25 mg/dL      Creatinine 1 17 mg/dL      Glucose 68 mg/dL      Calcium 9 4 mg/dL      AST 32 U/L      ALT 11 (L) U/L      Alkaline Phosphatase 84 U/L      Total Protein 7 5 g/dL      Albumin 3 5 g/dL      Total Bilirubin 2 50 (H) mg/dL      eGFR 59 ml/min/1 73sq m     Narrative:         National Kidney Disease Education Program recommendations are as follows:  GFR calculation is accurate only with a steady state creatinine  Chronic Kidney disease less than 60 ml/min/1 73 sq  meters  Kidney failure less than 15 ml/min/1 73 sq  meters      Troponin I [[id number]]  (Normal) Collected:  12/04/18 0958    Lab Status:  Final result Specimen:  Blood from Arm, Left Updated:  12/04/18 1023     Troponin I <0 02 ng/mL     CBC and differential [[id number]] Collected:  12/04/18 0958    Lab Status:  Final result Specimen:  Blood from Arm, Left Updated:  12/04/18 1005     WBC 5 76 Thousand/uL      RBC 4 10 Million/uL      Hemoglobin 12 8 g/dL      Hematocrit 39 7 %      MCV 97 fL      MCH 31 2 pg MCHC 32 2 g/dL      RDW 12 9 %      MPV 9 8 fL      Platelets 674 Thousands/uL      nRBC 0 /100 WBCs      Neutrophils Relative 52 %      Immat GRANS % 0 %      Lymphocytes Relative 35 %      Monocytes Relative 11 %      Eosinophils Relative 2 %      Basophils Relative 0 %      Neutrophils Absolute 3 03 Thousands/µL      Immature Grans Absolute 0 02 Thousand/uL      Lymphocytes Absolute 1 99 Thousands/µL      Monocytes Absolute 0 62 Thousand/µL      Eosinophils Absolute 0 09 Thousand/µL      Basophils Absolute 0 01 Thousands/µL     Fingerstick Glucose (POCT) [[id number]]  (Normal) Collected:  12/04/18 0955    Lab Status:  Final result Updated:  12/04/18 0956     POC Glucose 68 mg/dl                  CT abdomen pelvis with contrast   ED Interpretation by Sirisha Clifton DO (12/04 1256)   1  There are 2 hyperdensities identified in the right ureter likely nonobstructing stones  No hydronephrosis identified  These measure 2 mm and 3 mm in size  5 mm intrarenal stone also noted        2   Bilateral renal atrophy        3   Multiple pancreatic cysts  The largest measures up to 2 cm  For simple cyst(s) 2 cm or recommend gastroenterology and/or surgical oncology consult  EUS is likely now warranted  Considerations related to the patient's age and/or comorbidities may be    used to alter these recommendations         4   Colonic diverticulosis  No evidence for acute or radiculitis  Final Result by Tita Wilson DO (12/04 1217)      1  There are 2 hyperdensities identified in the right ureter likely nonobstructing stones  No hydronephrosis identified  These measure 2 mm and 3 mm in size  5 mm intrarenal stone also noted  2   Bilateral renal atrophy  3   Multiple pancreatic cysts  The largest measures up to 2 cm  For simple cyst(s) 2 cm or recommend gastroenterology and/or surgical oncology consult  EUS is likely now warranted    Considerations related to the patient's age and/or comorbidities may be    used to alter these recommendations  4   Colonic diverticulosis  No evidence for acute or radiculitis  Workstation performed: [id number]         X-ray chest 2 views   ED Interpretation by Lexie Granado DO (12/04 1043)   No acute cardiopulmonary disease  Final Result by Noemi Pinto MD (12/04 1004)      No acute cardiopulmonary disease  Workstation performed: [id number]         CT head without contrast   ED Interpretation by Lexie Granado DO (12/04 1091)   No acute intracranial abnormality  Microangiopathic changes  Final Result by Cm Solano MD (12/04 5218)      No acute intracranial abnormality  Microangiopathic changes  Workstation performed: [id number]                    Procedures  Procedures       Phone Contacts  ED Phone Contact    ED Course  ED Course as of Dec 04 1516   Tue Dec 04, 2018   2619 POC Glucose: 68   1043 Troponin I: <0 02   1043 Magnesium: 1 7   1111 Bacteria, UA: None Seen   1217 Blood glucose is down to 57  He will get his IV dextrose at this time  Awaiting CT scan of abdomen pelvis    1256 Leukocytes, UA: Negative   1301 Repeat episodes of hypoglycemia  Will admit for monitoring    1407 IVF ordered LACTIC ACID: (!!) 3 7   1407 TSH 3RD GENERATON: (!) 6 699                               MDM  Number of Diagnoses or Management Options  Altered mental state:   Hypoglycemia:   Renal calculi:   Diagnosis management comments: Altered mental status  Patient has CT head performed, ACS workup, evaluation for infection  No evidence concussion, no evidence cardiac etiology, a normal CT brain  Patient does have repeat episodes of hypoglycemia while here in this could be causing his altered mental status  He will be admitted for hypoglycemia for continued monitoring  for altered mental status         Amount and/or Complexity of Data Reviewed  Clinical lab tests: ordered and reviewed  Tests in the radiology section of CPT®: ordered and reviewed      CritCare Time    Disposition  Final diagnoses:   Hypoglycemia   Altered mental state   Renal calculi     Time reflects when diagnosis was documented in both MDM as applicable and the Disposition within this note     Time User Action Codes Description Comment    12/4/2018  1:19 PM Adam Pride Add [E16 2] Hypoglycemia     12/4/2018  1:19 PM Adam Pride Add [R41 82] Altered mental state     12/4/2018  1:40 PM Maria De Jesus Pride Add [N20 0] Renal calculi       ED Disposition     ED Disposition Condition Comment    Admit  Case was discussed with Rebeca Rodriguez (SL) and the patient's admission status was agreed to be Admission Status: observation status to the service of Dr Nancy Pena ([address] [place] [address])   Follow-up Information    None         Patient's Medications   Discharge Prescriptions    No medications on file     No discharge procedures on file      ED Provider  Electronically Signed by           Bill Kwok, DO  12/04/18 700 Óscar Capellan, DO  12/04/18 1402

## 2018-12-04 NOTE — ASSESSMENT & PLAN NOTE
Per daughter  No records  No mention of prior CVA and outpatient neurology notes    · Continue aspirin, statin

## 2018-12-04 NOTE — ASSESSMENT & PLAN NOTE
Lab Results   Component Value Date    HGBA1C 6 9 (H) 09/19/2015       Recent Labs      12/04/18   0955  12/04/18   1038  12/04/18   1217  12/04/18   1247   POCGLU  68  90  57*  124       Blood Sugar Average: Last 72 hrs:  (P) 84 75   Family noted the patient to be fatigue for the last 3 days  Morning blood sugar was 80  Family states he has never below 110  Patient taking metformin 1000 mg BID and Lantus 32 units at HS despite poor oral intake  Blood sugar during ED workup, 57   Improved with 1/2 amp D50  Start D10 infusion, Accu-Cheks AC + HS, Lispro insulin sliding scale

## 2018-12-04 NOTE — ASSESSMENT & PLAN NOTE
Chronic and at baseline  Follows up with outpatient Neurology  Per outpatient records patient is not interested in any treatment, medication, or neuro toxin injection at this time

## 2018-12-04 NOTE — ASSESSMENT & PLAN NOTE
Likely due to dehydration and hypoglycemia 2/2 poor oral intake for the last 3 days with hypoglycemic medication use  Completed course of ciprofloxacin for UTI on 11/17/2018  Concern for resistant UTI  Concern for acute CVA  ACS ruled out  CT head: No acute intracranial abnormality  Microangiopathic changes  Deep brain stimulator noted  Chest x-ray: No acute cardiopulmonary disease  Pacemaker leads intact  CT abdomen pelvis:  No signs of infection  Lactic acid elevated, likely due to dehydration, possible UTI  · Follow up urine culture  · Start Rocephin 1 g IV daily and check procalcitonin   · Repeat lactic acid until less than 2  · Hold home hypoglycemic medications, Metformin and Lantus  · Start D10 infusion  · Monitor Accu-Cheks AC and HS  · NPO  · Continue Parkinson's medication, Sinemet  Hold Requip  · Supportive care, up with assist  · Spoke with on-call neurologist, Dr Chuck Pallas, who recommends plain head CT, serial exams, and infectious workup  Consult Neurology tomorrow if no improvement      · If no improvement, consider CTA head and neck to rule out acute CVA as patient unable to obtain MRI brain

## 2018-12-04 NOTE — ASSESSMENT & PLAN NOTE
Incidental finding on CT abdomen pelvis: 'Multiple pancreatic cysts  The largest measures up to 2 cm  For simple cyst(s) 2 cm or recommend gastroenterology and/or surgical oncology consult   EUS is likely now warranted '  · Outpatient GI follow-up

## 2018-12-04 NOTE — TELEPHONE ENCOUNTER
FYI  Daughter in law Cathryn Morrell called on a bad connection  Pt in ED and about to go in for CT  Pt has DBS  Was told by hospital "we know what we are doing"  Dr Malka Chavez not in office  I spoke with Jeanine  DBS typically turned off  Alma Lee unsure of protocol and referred me to Ángel at [facility]  Cathryn Morrell did leave a message for him a few minutes ago and awaiting cb  I also left him another message  Cathryn Morrell to speak with [facility] dept re DBS

## 2018-12-04 NOTE — ASSESSMENT & PLAN NOTE
Status post deep brain stimulator  Follows up with outpatient Neurology    · Continue home medication, Sinemet   · Hold Requip  · PT/OT eval and treat

## 2018-12-05 LAB
ALBUMIN SERPL BCP-MCNC: 3 G/DL (ref 3.5–5)
ALP SERPL-CCNC: 76 U/L (ref 46–116)
ALT SERPL W P-5'-P-CCNC: 16 U/L (ref 12–78)
ANION GAP SERPL CALCULATED.3IONS-SCNC: 7 MMOL/L (ref 4–13)
AST SERPL W P-5'-P-CCNC: 17 U/L (ref 5–45)
BACTERIA UR CULT: NORMAL
BASOPHILS # BLD AUTO: 0.04 THOUSANDS/ΜL (ref 0–0.1)
BASOPHILS NFR BLD AUTO: 1 % (ref 0–1)
BILIRUB SERPL-MCNC: 2.4 MG/DL (ref 0.2–1)
BUN SERPL-MCNC: 16 MG/DL (ref 5–25)
CALCIUM SERPL-MCNC: 9 MG/DL (ref 8.3–10.1)
CHLORIDE SERPL-SCNC: 103 MMOL/L (ref 100–108)
CO2 SERPL-SCNC: 29 MMOL/L (ref 21–32)
CREAT SERPL-MCNC: 1.13 MG/DL (ref 0.6–1.3)
EOSINOPHIL # BLD AUTO: 0.06 THOUSAND/ΜL (ref 0–0.61)
EOSINOPHIL NFR BLD AUTO: 1 % (ref 0–6)
ERYTHROCYTE [DISTWIDTH] IN BLOOD BY AUTOMATED COUNT: 13 % (ref 11.6–15.1)
GFR SERPL CREATININE-BSD FRML MDRD: 61 ML/MIN/1.73SQ M
GLUCOSE P FAST SERPL-MCNC: 240 MG/DL (ref 65–99)
GLUCOSE SERPL-MCNC: 226 MG/DL (ref 65–140)
GLUCOSE SERPL-MCNC: 240 MG/DL (ref 65–140)
GLUCOSE SERPL-MCNC: 256 MG/DL (ref 65–140)
GLUCOSE SERPL-MCNC: 305 MG/DL (ref 65–140)
GLUCOSE SERPL-MCNC: 311 MG/DL (ref 65–140)
HCT VFR BLD AUTO: 36.9 % (ref 36.5–49.3)
HGB BLD-MCNC: 12 G/DL (ref 12–17)
IMM GRANULOCYTES # BLD AUTO: 0.02 THOUSAND/UL (ref 0–0.2)
IMM GRANULOCYTES NFR BLD AUTO: 0 % (ref 0–2)
LYMPHOCYTES # BLD AUTO: 1.26 THOUSANDS/ΜL (ref 0.6–4.47)
LYMPHOCYTES NFR BLD AUTO: 25 % (ref 14–44)
MAGNESIUM SERPL-MCNC: 1.6 MG/DL (ref 1.6–2.6)
MCH RBC QN AUTO: 31.3 PG (ref 26.8–34.3)
MCHC RBC AUTO-ENTMCNC: 32.5 G/DL (ref 31.4–37.4)
MCV RBC AUTO: 96 FL (ref 82–98)
MONOCYTES # BLD AUTO: 0.72 THOUSAND/ΜL (ref 0.17–1.22)
MONOCYTES NFR BLD AUTO: 14 % (ref 4–12)
NEUTROPHILS # BLD AUTO: 2.99 THOUSANDS/ΜL (ref 1.85–7.62)
NEUTS SEG NFR BLD AUTO: 59 % (ref 43–75)
NRBC BLD AUTO-RTO: 0 /100 WBCS
PHOSPHATE SERPL-MCNC: 3.2 MG/DL (ref 2.3–4.1)
PLATELET # BLD AUTO: 151 THOUSANDS/UL (ref 149–390)
PMV BLD AUTO: 9.6 FL (ref 8.9–12.7)
POTASSIUM SERPL-SCNC: 4 MMOL/L (ref 3.5–5.3)
PROT SERPL-MCNC: 6.5 G/DL (ref 6.4–8.2)
RBC # BLD AUTO: 3.84 MILLION/UL (ref 3.88–5.62)
SODIUM SERPL-SCNC: 139 MMOL/L (ref 136–145)
WBC # BLD AUTO: 5.09 THOUSAND/UL (ref 4.31–10.16)

## 2018-12-05 PROCEDURE — 97167 OT EVAL HIGH COMPLEX 60 MIN: CPT

## 2018-12-05 PROCEDURE — 92610 EVALUATE SWALLOWING FUNCTION: CPT

## 2018-12-05 PROCEDURE — 97163 PT EVAL HIGH COMPLEX 45 MIN: CPT

## 2018-12-05 PROCEDURE — 82948 REAGENT STRIP/BLOOD GLUCOSE: CPT

## 2018-12-05 PROCEDURE — 99222 1ST HOSP IP/OBS MODERATE 55: CPT | Performed by: PSYCHIATRY & NEUROLOGY

## 2018-12-05 PROCEDURE — 99232 SBSQ HOSP IP/OBS MODERATE 35: CPT | Performed by: INTERNAL MEDICINE

## 2018-12-05 PROCEDURE — 83735 ASSAY OF MAGNESIUM: CPT | Performed by: NURSE PRACTITIONER

## 2018-12-05 PROCEDURE — 85025 COMPLETE CBC W/AUTO DIFF WBC: CPT | Performed by: NURSE PRACTITIONER

## 2018-12-05 PROCEDURE — 94640 AIRWAY INHALATION TREATMENT: CPT

## 2018-12-05 PROCEDURE — G8988 SELF CARE GOAL STATUS: HCPCS

## 2018-12-05 PROCEDURE — 80053 COMPREHEN METABOLIC PANEL: CPT | Performed by: NURSE PRACTITIONER

## 2018-12-05 PROCEDURE — G8979 MOBILITY GOAL STATUS: HCPCS

## 2018-12-05 PROCEDURE — 94760 N-INVAS EAR/PLS OXIMETRY 1: CPT

## 2018-12-05 PROCEDURE — G8978 MOBILITY CURRENT STATUS: HCPCS

## 2018-12-05 PROCEDURE — 84100 ASSAY OF PHOSPHORUS: CPT | Performed by: NURSE PRACTITIONER

## 2018-12-05 PROCEDURE — G8987 SELF CARE CURRENT STATUS: HCPCS

## 2018-12-05 RX ORDER — IPRATROPIUM BROMIDE AND ALBUTEROL SULFATE 2.5; .5 MG/3ML; MG/3ML
3 SOLUTION RESPIRATORY (INHALATION)
Status: DISCONTINUED | OUTPATIENT
Start: 2018-12-05 | End: 2018-12-05

## 2018-12-05 RX ORDER — SODIUM CHLORIDE FOR INHALATION 0.9 %
3 VIAL, NEBULIZER (ML) INHALATION EVERY 6 HOURS PRN
Status: DISCONTINUED | OUTPATIENT
Start: 2018-12-05 | End: 2018-12-06 | Stop reason: HOSPADM

## 2018-12-05 RX ORDER — LEVALBUTEROL 1.25 MG/.5ML
1.25 SOLUTION, CONCENTRATE RESPIRATORY (INHALATION) EVERY 6 HOURS PRN
Status: DISCONTINUED | OUTPATIENT
Start: 2018-12-05 | End: 2018-12-06 | Stop reason: HOSPADM

## 2018-12-05 RX ORDER — CARBIDOPA AND LEVODOPA 50; 200 MG/1; MG/1
1 TABLET, EXTENDED RELEASE ORAL
Status: DISCONTINUED | OUTPATIENT
Start: 2018-12-05 | End: 2018-12-06 | Stop reason: HOSPADM

## 2018-12-05 RX ADMIN — DEXTROSE 75 ML/HR: 10 SOLUTION INTRAVENOUS at 15:48

## 2018-12-05 RX ADMIN — CARBIDOPA AND LEVODOPA 1 TABLET: 25; 100 TABLET ORAL at 13:43

## 2018-12-05 RX ADMIN — CARBIDOPA AND LEVODOPA 1 TABLET: 25; 100 TABLET ORAL at 09:17

## 2018-12-05 RX ADMIN — ATORVASTATIN CALCIUM 80 MG: 40 TABLET, FILM COATED ORAL at 17:05

## 2018-12-05 RX ADMIN — CARBIDOPA AND LEVODOPA 1 TABLET: 50; 200 TABLET, EXTENDED RELEASE ORAL at 17:43

## 2018-12-05 RX ADMIN — INSULIN LISPRO 2 UNITS: 100 INJECTION, SOLUTION INTRAVENOUS; SUBCUTANEOUS at 21:10

## 2018-12-05 RX ADMIN — DEXTROSE 75 ML/HR: 10 SOLUTION INTRAVENOUS at 05:42

## 2018-12-05 RX ADMIN — ENOXAPARIN SODIUM 40 MG: 40 INJECTION SUBCUTANEOUS at 09:50

## 2018-12-05 RX ADMIN — METOPROLOL TARTRATE 12.5 MG: 25 TABLET ORAL at 20:04

## 2018-12-05 RX ADMIN — IPRATROPIUM BROMIDE AND ALBUTEROL SULFATE 3 ML: 2.5; .5 SOLUTION RESPIRATORY (INHALATION) at 07:28

## 2018-12-05 RX ADMIN — INSULIN LISPRO 3 UNITS: 100 INJECTION, SOLUTION INTRAVENOUS; SUBCUTANEOUS at 17:44

## 2018-12-05 RX ADMIN — ASPIRIN 81 MG 81 MG: 81 TABLET ORAL at 17:05

## 2018-12-05 NOTE — PHYSICAL THERAPY NOTE
Physical Therapy Evaluation     Patient's Name: Bladimir Ralph  Admitting Diagnosis  Renal calculi [N20 0]  Altered mental state [R41 82]  Weakness generalized [R53 1]  Hypoglycemia [E16 2]  Parkinson's disease with use of electrical brain stimulation ([facility]) [A91]  Acute metabolic encephalopathy [T72 85]  Dysphagia, unspecified type [R13 10]    Problem List  Patient Active Problem List   Diagnosis    Parkinson's disease with use of electrical brain stimulation ([facility] )    S/P deep brain stimulator placement    Drooling    Dysphagia    Pancreatic cyst    Generalized weakness    Acute metabolic encephalopathy    Type 2 diabetes mellitus ([facility] )    HTN (hypertension)    History of CVA (cerebrovascular accident)       Past Medical History  Past Medical History:   Diagnosis Date    Arthritis     BPH (benign prostatic hyperplasia)     Diabetes ([place] [address] )     Heart problem     Movement disorder     Parkinson's disease ([place] [address] )     Stroke ([place] [address] )        Past Surgical History  Past Surgical History:   Procedure Laterality Date    APPENDECTOMY      CARDIAC DEFIBRILLATOR PLACEMENT      CARDIAC DEFIBRILLATOR PLACEMENT      CHOLECYSTECTOMY      CORONARY ANGIOPLASTY WITH STENT PLACEMENT      DEEP BRAIN STIMULATOR PLACEMENT      EAR SURGERY      GALLBLADDER SURGERY      KNEE SURGERY      SKIN CANCER EXCISION          12/05/18 0942   Note Type   Note type Eval only   Pain Assessment   Pain Assessment No/denies pain   Pain Score No Pain   Home Living   Type of Home House   Home Layout Two level;Performs ADLs on one level; Able to live on main level with bedroom/bathroom;Stairs to enter with rails  (4 KLAUS / ramp being installed / 1st floor set-up)   Bathroom Shower/Tub Walk-in shower   Bathroom Toilet Raised   Bathroom Equipment Grab bars in shower; Shower chair;Commode;Grab bars around toilet   [address] Wheelchair-manual;Walker;Cane;Life alert   Additional Comments ambulatory with walker (rollator) / sleeping in lift chair/recliner chair   Prior Function   Level of [place] Needs assistance with ADLs and functional mobility; Needs assistance with IADLs   Lives With Spouse   Receives Help From Family;Personal care attendant;Home health   ADL Assistance Needs assistance   IADLs Needs assistance   Falls in the last 6 months 1 to 4   Vocational Retired   Comments 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 + home PT/OT/ST/RN 2x/wk   Restrictions/Precautions   Wells Ronnell Bearing Precautions Per Order No   Braces or Orthoses Other (Comment)  (none at baseline)   Other Precautions Chair Alarm; Bed Alarm; Fall Risk;Cognitive   General   Family/Caregiver Present Yes  (spouse and daughter-in-law)   Cognition   Overall Cognitive Status Impaired   Arousal/Participation Cooperative   Attention Attends with cues to redirect   Orientation Level Oriented to person;Oriented to place;Oriented to situation;Disoriented to time   Memory Decreased short term memory   Following Commands Follows all commands and directions without difficulty   Comments Pt agreeable to PT evaluation   RUE Assessment   RUE Assessment X  (AROM functional range, (+) tremors)   RUE Strength   RUE Overall Strength Deficits  (please refer to OT evaluation for further details)   LUE Assessment   LUE Assessment X  (AROM functional range, (+) tremors)   LUE Strength   LUE Overall Strength Deficits  (please refer to OT evaluation for further details)   RLE Assessment   RLE Assessment WFL  (4+/5)   LLE Assessment   LLE Assessment WFL  (4+/5)   Coordination   Movements are Fluid and Coordinated 0   Coordination and Movement Description (+) tremors   Sensation WFL   Light Touch   RLE Light Touch Grossly intact   LLE Light Touch Grossly intact   Bed Mobility   Rolling R 4  Minimal assistance   Additional items Assist x 1; Increased time required;Verbal cues;LE management; Bedrails;HOB elevated   Supine to Sit 4  Minimal assistance   Additional items Assist x 2; Increased time required;Verbal cues;LE management; Bedrails;HOB elevated   Transfers   Sit to Stand 4  Minimal assistance   Additional items Assist x 1; Increased time required;Verbal cues   Stand to Sit 4  Minimal assistance   Additional items Assist x 1; Increased time required;Verbal cues;Armrests   Ambulation/Elevation   Gait pattern Short stride; Step to;Excessively slow; Shuffling;Decreased foot clearance;Narrow MERLIN   Gait Assistance 4  Minimal assist   Additional items Assist x 1;Verbal cues  (+ assist of 2nd for line management)   Assistive Device Rolling walker   Distance 10' x 2   Stair Management Assistance Not tested   Balance   Static Sitting Good   Dynamic Sitting Fair +   Static Standing Fair   Dynamic Standing Fair -   Ambulatory Poor +   Activity Tolerance   Activity Tolerance Patient tolerated treatment well   Nurse Amrit Trujillo, ELISABETH verbalized pt appropriate for PT evaluation; made aware of session outcomes; post-session, pt seated in recliner chair, all needs within reach, family present at bedside and chair alarm engaged    Assessment   Prognosis Good   Problem List Decreased strength;Decreased mobility; Impaired balance;Decreased coordination;Decreased cognition;Decreased skin integrity   Assessment Pt is [de-identified] y o  male seen for PT evaluation s/p admit to [facility] on 23/4/5956 w/ Acute metabolic encephalopathy  PT consulted to assess pt's functional mobility and d/c needs  Order placed for PT eval and tx, w/ up w/ A order  Performed at least 2 patient identifiers during session: Name and wristband  Comorbidities affecting pt's physical performance at time of assessment include: arthritis, BPH, diabetes, heart problem, movement disorder, Parkinson's Disease, stroke  PTA, pt was requiring A for mobility, ambulates household distances, has 4 KLAUS, lives w/ spouse in two level house with 1st floor set-up and retired   Pt receives assistance from personal care attendant/family M-W-F for 7 1/2 hrs/wk + home PT/OT/ST/RN 2x/wk  Personal factors affecting pt at time of IE include: inaccessible home environment, ambulating w/ assistive device, stairs to enter home, inability to navigate level surfaces w/o external assistance, decreased cognition, positive fall history, inability to perform IADLs, inability to perform ADLs and inability to live alone  Please find objective findings from PT assessment regarding body systems outlined above with impairments and limitations including weakness, impaired balance, impaired coordination, gait deviations, fall risk, decreased skin integrity and decreased cognition  The following objective measures performed on IE also reveal limitations: Barthel Index: 35/100 and Modified Whit: 4 (moderate/severe disability)  Pt's clinical presentation is currently unstable/unpredictable seen in pt's presentation of unstable medical status requiring ongoing medical management/monitoring, evident in need for assist w/ all phases of mobility when usually mobilizing independently, need for input for mobility technique/safety, positive fall history and patient presents with multiple co-morbidities impacting PT intervention  Pt to benefit from continued PT tx to address deficits as defined above and maximize level of functional independent mobility and consistency  From PT/mobility standpoint, recommendation at time of d/c would be home with family/caregiver support - 24 hour supervision/assist and Home PT pending progress in order to facilitate return to PLOF     Barriers to Discharge Inaccessible home environment   Goals   Patient Goals to go home   STG Expiration Date 12/15/18   Short Term Goal #1 In 7-10 days: Perform all bed mobility tasks modified independent to decrease caregiver burden, Perform all transfers with distant S to improve independence, Ambulate > 25 ft  with RW with SBA w/o LOB and w/ normalized gait pattern 100% of the time, Navigate 4 stairs with CGA with unilateral handrail to facilitate return to previous living environment, Increase all balance 1/2 grade to decrease risk for falls and Complete % of the time   Treatment Day 0   Plan   Treatment/Interventions Functional transfer training;LE strengthening/ROM; Elevations; Therapeutic exercise; Endurance training;Patient/family training;Equipment eval/education; Bed mobility;Gait training;Spoke to nursing;OT;Family   PT Frequency 5x/wk   Recommendation   Recommendation 24 hour supervision/assist;Home PT; Home with family support   PT - OK to Discharge No   Additional Comments pt to demonstrate consistency with level surface ambulation and trial stairs prior to d/c   Modified Freedom Scale   Modified Freedom Scale 4   Barthel Index   Feeding 5   Bathing 0   Grooming Score 0   Dressing Score 5   Bladder Score 0   Bowels Score 10   Toilet Use Score 5   Transfers (Bed/Chair) Score 10   Mobility (Level Surface) Score 0   Stairs Score 0   Barthel Index Score 35         Beuford Drain, PT, DPT

## 2018-12-05 NOTE — PLAN OF CARE
Problem: Potential for Falls  Goal: Patient will remain free of falls  INTERVENTIONS:  - Assess patient frequently for physical needs  -  Identify cognitive and physical deficits and behaviors that affect risk of falls  -  [place] fall precautions as indicated by assessment   - Educate patient/family on patient safety including physical limitations  - Instruct patient to call for assistance with activity based on assessment  - Modify environment to reduce risk of injury  - Consider OT/PT consult to assist with strengthening/mobility   Outcome: Progressing      Problem: Prexisting or High Potential for Compromised Skin Integrity  Goal: Skin integrity is maintained or improved  INTERVENTIONS:  - Identify patients at risk for skin breakdown  - Assess and monitor skin integrity  - Assess and monitor nutrition and hydration status  - Monitor labs (i e  albumin)  - Assess for incontinence   - Turn and reposition patient  - Assist with mobility/ambulation  - Relieve pressure over bony prominences  - Avoid friction and shearing  - Provide appropriate hygiene as needed including keeping skin clean and dry  - Evaluate need for skin moisturizer/barrier cream  - Collaborate with interdisciplinary team (i e  Nutrition, Rehabilitation, etc )   - Patient/family teaching   Outcome: Progressing      Problem: Nutrition/Hydration-ADULT  Goal: Nutrient/Hydration intake appropriate for improving, restoring or maintaining nutritional needs  Monitor and assess patient's nutrition/hydration status for malnutrition (ex- brittle hair, bruises, dry skin, pale skin and conjunctiva, muscle wasting, smooth red tongue, and disorientation)  Collaborate with interdisciplinary team and initiate plan and interventions as ordered  Monitor patient's weight and dietary intake as ordered or per policy  Utilize nutrition screening tool and intervene per policy   Determine patient's food preferences and provide high-protein, high-caloric foods as appropriate       INTERVENTIONS:  - Monitor oral intake, urinary output, labs, and treatment plans  - Assess nutrition and hydration status and recommend course of action  - Evaluate amount of meals eaten  - Assist patient with eating if necessary   - Allow adequate time for meals  - Recommend/ encourage appropriate diets, oral nutritional supplements, and vitamin/mineral supplements  - Order, calculate, and assess calorie counts as needed  - Recommend, monitor, and adjust tube feedings and TPN/PPN based on assessed needs  - Assess need for intravenous fluids  - Provide specific nutrition/hydration education as appropriate  - Include patient/family/caregiver in decisions related to nutrition   Outcome: Progressing      Problem: PAIN - ADULT  Goal: Verbalizes/displays adequate comfort level or baseline comfort level  Interventions:  - Encourage patient to monitor pain and request assistance  - Assess pain using appropriate pain scale  - Administer analgesics based on type and severity of pain and evaluate response  - Implement non-pharmacological measures as appropriate and evaluate response  - Consider cultural and social influences on pain and pain management  - Notify physician/advanced practitioner if interventions unsuccessful or patient reports new pain   Outcome: Progressing      Problem: INFECTION - ADULT  Goal: Absence or prevention of progression during hospitalization  INTERVENTIONS:  - Assess and monitor for signs and symptoms of infection  - Monitor lab/diagnostic results  - Monitor all insertion sites, i e  indwelling lines, tubes, and drains  - Monitor endotracheal (as able) and nasal secretions for changes in amount and color  - [place] appropriate cooling/warming therapies per order  - Administer medications as ordered  - Instruct and encourage patient and family to use good hand hygiene technique  - Identify and instruct in appropriate isolation precautions for identified infection/condition Outcome: Progressing    Goal: Absence of fever/infection during neutropenic period  INTERVENTIONS:  - Monitor WBC  - Implement neutropenic guidelines   Outcome: Progressing      Problem: SAFETY ADULT  Goal: Maintain or return to baseline ADL function  INTERVENTIONS:  -  Assess patient's ability to carry out ADLs; assess patient's baseline for ADL function and identify physical deficits which impact ability to perform ADLs (bathing, care of mouth/teeth, toileting, grooming, dressing, etc )  - Assess/evaluate cause of self-care deficits   - Assess range of motion  - Assess patient's mobility; develop plan if impaired  - Assess patient's need for assistive devices and provide as appropriate  - Encourage maximum independence but intervene and supervise when necessary  ¯ Involve family in performance of ADLs  ¯ Assess for home care needs following discharge   ¯ Request OT consult to assist with ADL evaluation and planning for discharge  ¯ Provide patient education as appropriate   Outcome: Progressing    Goal: Maintain or return mobility status to optimal level  INTERVENTIONS:  - Assess patient's baseline mobility status (ambulation, transfers, stairs, etc )    - Identify cognitive and physical deficits and behaviors that affect mobility  - Identify mobility aids required to assist with transfers and/or ambulation (gait belt, sit-to-stand, lift, walker, cane, etc )  - [place] fall precautions as indicated by assessment  - Record patient progress and toleration of activity level on Mobility SBAR; progress patient to next Phase/Stage  - Instruct patient to call for assistance with activity based on assessment  - Request Rehabilitation consult to assist with strengthening/weightbearing, etc    Outcome: Progressing      Problem: DISCHARGE PLANNING  Goal: Discharge to home or other facility with appropriate resources  INTERVENTIONS:  - Identify barriers to discharge w/patient and caregiver  - Arrange for needed discharge resources and transportation as appropriate  - Identify discharge learning needs (meds, wound care, etc )  - Arrange for interpretive services to assist at discharge as needed  - Refer to Case Management Department for coordinating discharge planning if the patient needs post-hospital services based on physician/advanced practitioner order or complex needs related to functional status, cognitive ability, or social support system   Outcome: Progressing      Problem: Knowledge Deficit  Goal: Patient/family/caregiver demonstrates understanding of disease process, treatment plan, medications, and discharge instructions  Complete learning assessment and assess knowledge base    Interventions:  - Provide teaching at level of understanding  - Provide teaching via preferred learning methods   Outcome: Progressing

## 2018-12-05 NOTE — UTILIZATION REVIEW
Initial Clinical Review    Admission: Date/Time/Statement: OBS  12/4  1321 converted to IP on 12/5 @ 0903 for treatment of hypoglycemia and mental status change     Admitting Physician Antoine Mack    Level of Care Med Surg    Estimated length of stay More than 2 Midnights    Certification I certify that inpatient services are medically necessary for this patient for a duration of greater than two midnights  See H&P and MD Progress Notes for additional information about the patient's course of treatment  ED: Date/Time/Mode of Arrival:   ED Arrival Information     Expected Arrival Acuity Means of Arrival Escorted By Service Admission Type    - 12/4/2018 09:15 Urgent Ambulance [facility] General Medicine Urgent    Arrival Complaint    WEAKNESS          Chief Complaint:   Chief Complaint   Patient presents with    Weakness - Generalized     Pt and family state patient has been sleeping more than usual since Sunday  Pt denies any other complaints aside from "being wiped"  Daughter in law states pt has hx of UTI and this is how he usually presents with UTI's       History of Illness: [de-identified] yo male to ED from home via EMS w/ c/o generalized weakness for the last 3 days assoc w/ hypoglycemia   Daughter noticed him to be weak and "off " starting on Saturday   Checked BS on Tues and it was 80 " he normally does not run below 110 " Recently completed 10 day course of Cipro on 11/17        PE : mm dry , dec BS , wheezing , oriented to person and place     ED Vital Signs:   ED Triage Vitals   Temperature Pulse Respirations Blood Pressure SpO2   12/04/18 0949 12/04/18 0930 12/04/18 0930 12/04/18 0930 12/04/18 0930   98 1 °F (36 7 °C) 75 20 138/73 98 %      Temp Source Heart Rate Source Patient Position - Orthostatic VS BP Location FiO2 (%)   12/04/18 0949 12/04/18 0930 12/04/18 0930 12/04/18 0930 --   Oral Monitor Sitting Right arm       Pain Score       12/04/18 0930       No Pain        Wt Readings from Last 1 Encounters:   12/04/18 90 kg (198 lb 6 6 oz)       Vital Signs (abnormal): wnl     Abnormal Labs/Diagnostic Test Results: lactic acid   3 2 and  3 7, alt   11, total bili  2 50  CT abd- 1   There are 2 hyperdensities identified in the right ureter likely nonobstructing stones   No hydronephrosis identified   These measure 2 mm and 3 mm in size   5 mm intrarenal stone also noted  2   Bilateral renal atrophy  3   Multiple pancreatic cysts  The largest measures up to 2 cm  For simple cyst(s) 2 cm or recommend gastroenterology and/or surgical oncology consult  EUS is likely now warranted   Considerations related to the patient's age and/or comorbidities may be   used to alter these recommendations  4   Colonic diverticulosis   No evidence for acute or radiculitis    CT head- wnl   EKG- NSR  RBBB    ED Treatment:   Medication Administration from 12/04/2018 0915 to 12/04/2018 1826       Date/Time Order Dose Route Action Action by Comments     12/04/2018 1220 dextrose 50 % IV solution 25 mL 25 mL Intravenous Given Raissa Bliss RN      12/04/2018 1056 dextrose 50 % IV solution 25 mL 0 mL Intravenous Hold Dwayne Palacio RN BS 90; MD instructed to hold     12/04/2018 1107 iohexol (OMNIPAQUE) 350 MG/ML injection (MULTI-DOSE) 100 mL 100 mL Intravenous Given Raegan Oquendo      12/04/2018 1710 dextrose 5 % and sodium chloride 0 9 % infusion 0 mL/hr Intravenous Stopped Raissa Bliss RN      12/04/2018 1330 dextrose 5 % and sodium chloride 0 9 % infusion 75 mL/hr Intravenous New Bag Raissa Bliss RN      12/04/2018 1656 magnesium oxide (MAG-OX) tablet 400 mg 400 mg Oral Not Given Dwayne Palacio RN      12/04/2018 1524 sodium chloride 0 9 % bolus 1,000 mL 0 mL Intravenous Stopped Raissa Bliss RN      12/04/2018 1424 sodium chloride 0 9 % bolus 1,000 mL 1,000 mL Intravenous New Bag Raissa Bliss RN      12/04/2018 1654 insulin lispro (HumaLOG) 100 units/mL subcutaneous injection 1-5 Units 1 Units Subcutaneous Not Given Asif Willis RN      12/04/2018 1710 ipratropium-albuterol (DUO-NEB) 0 5-2 5 mg/3 mL inhalation solution 3 mL 3 mL Nebulization Given Raissa Bliss RN      12/04/2018 1711 dextrose 50 % IV solution 25 mL 25 mL Intravenous Given Asif Willis RN      12/04/2018 1714 dextrose infusion 10 % 75 mL/hr Intravenous New Bag Raissa Bliss RN      12/04/2018 1725 cefTRIAXone (ROCEPHIN) 1,000 mg in dextrose 5 % 50 mL IVPB 1,000 mg Intravenous New Bag Asif Willis RN           Past Medical/Surgical History: Active Ambulatory Problems     Diagnosis Date Noted    Parkinson's disease with use of electrical brain stimulation ([facility] ) 04/12/2018    S/P deep brain stimulator placement 04/13/2018    Drooling 04/13/2018    Dysphagia 10/15/2018       Past Medical History:   Diagnosis Date    Arthritis     BPH (benign prostatic hyperplasia)     Diabetes ([facility] )     Heart problem     Movement disorder     Parkinson's disease ([facility]     Stroke ([facility] )        Admitting Diagnosis: Renal calculi [N20 0]  Altered mental state [R41 82]  Weakness generalized [R53 1]  Hypoglycemia [E16 2]  Parkinson's disease with use of electrical brain stimulation ([facility] ) [K54]  Acute metabolic encephalopathy [Y92 39]  Dysphagia, unspecified type [R13 10]    Age/Sex: [de-identified] y o  male    Assessment/Plan: [de-identified] yo male admitted from home w/ acute metabolic encephalopathy - likely d/t dehydration and hypoglycemia 2/2 poor po intake for 3 days   Concern for UTI , CVA will r/o   Lactic acid elevated likley d/t dehydration possible UTI   F/u ua cx , start rocephin , check pct , hold metformin and lantus, start D10 infusion and monitor BS , NPO   Consult neuro , consider CTA head r/o acute CVA , unable to obtain MRI   Generalized weakness acute on chronic  Father/u on pending ua cx  Dysphagia will get video swallow , NPO until mentation improves , speech and GI consult     DM - BS ED 57 given 1/2 amp D50 w/ improvement , cont D10 infusion , accuchecks and SSI       Admission Orders:  Scheduled Meds:   Current Facility-Administered Medications:  acetaminophen 650 mg Oral Q6H PRN     aspirin 81 mg Oral QPM     atorvastatin 80 mg Oral QPM     carbidopa-levodopa 1 tablet Oral HS     carbidopa-levodopa 1 tablet Oral TID     cefTRIAXone 1,000 mg Intravenous Q24H  Last Rate: 1,000 mg (12/04/18 6357)   dextrose 75 mL/hr Intravenous Continuous  Last Rate: 75 mL/hr (12/05/18 6534)   enoxaparin 40 mg Subcutaneous Daily     insulin lispro 1-5 Units Subcutaneous TID AC     insulin lispro 1-5 Units Subcutaneous HS     ipratropium-albuterol 3 mL Nebulization TID     magnesium oxide 400 mg Oral Once     metoprolol tartrate 12 5 mg Oral Q12H [address]         Fingerstick ac and hs   OT PT eval   Speech eval   Up w/ assist   Neuro consult   NPO   tele  12/5 cbc , phos , mg , cmp   Gluc  240, alb   3 0, total bili   2 40

## 2018-12-05 NOTE — OCCUPATIONAL THERAPY NOTE
Occupational Therapy Evaluation        Patient Name: Gume Contreras  Today's Date: 12/5/2018 12/05/18 2142   Note Type   Note type Eval only   Restrictions/Precautions   Weight Bearing Precautions Per Order No   Braces or Orthoses Other (Comment)  (none at baseline)   Other Precautions Chair Alarm; Bed Alarm; Fall Risk   Pain Assessment   Pain Assessment No/denies pain   Pain Score No Pain   Home Living   Type of Home House   Home Layout Two level;Performs ADLs on one level; Able to live on main level with bedroom/bathroom;Stairs to enter with rails  (4 KLAUS / ramp being installed)   Bathroom Shower/Tub Walk-in shower   Bathroom Toilet Raised   Bathroom Equipment Grab bars in shower; Shower chair;Commode;Grab bars around toilet   [address] Wheelchair-manual;Walker;Cane;Life alert   Additional Comments ambulatory with walker/ sleeping    Prior Function   Level of [place] Needs assistance with ADLs and functional mobility   Lives With Spouse; Alone   Receives Help From Family;Personal care attendant   ADL Assistance Needs assistance   IADLs Needs assistance   Falls in the last 6 months 1 to 4   Vocational Retired   Comments M-W-F 7 1/2 hrs per week/ [place]  OT/PT/ST 2x week   Lifestyle   Autonomy Patient and family reporting patient able to complete basic self care tasks with setp and supervision, min assist for LB ADLs, patient ambulates with RW (rollator) and is able to transfer on/ off lift chair without assistance  patient lives with his spouse in a  2 story house  with 4 KLAUS, family in the process of installing a ramp  Patient has assistance throughout the day, until 10 PM, has life alert and cell phone service  Patient has Home Care services 2x week thru [facility] for OT/PT and ST     Reciprocal Relationships Supportive family   Psychosocial   Psychosocial (WDL) WDL   ADL   Eating Assistance 4  Minimal Assistance   Eating Deficit [place] assist  (uses built up handles on utensils)   Grooming Assistance 4  Minimal Assistance   UB Bathing Assistance 3  Moderate Assistance   LB [address] 2  Maximal [address] 3  Moderate Assistance   LB Dressing Assistance 2  Maximal [address]  2  Maximal Assistance   Functional Assistance 3  Moderate Assistance   Bed Mobility   Rolling R 4  Minimal assistance   Additional items Assist x 1;HOB elevated; Bedrails; Increased time required;Verbal cues   Supine to Sit 3  Moderate assistance   Additional items Assist x 1; Increased time required;Verbal cues;LE management   Transfers   Sit to Stand 4  Minimal assistance   Additional items Assist x 2; Increased time required; Bedrails;Verbal cues   Stand to Sit 4  Minimal assistance   Additional items Assist x 1; Increased time required;Verbal cues   Toilet transfer 4  Minimal assistance   Additional items Assist x 1;Bedrails;Standard toilet  (grab bar use)   Functional Mobility   Functional Mobility 4  Minimal assistance   Additional items Rolling walker   Balance   Static Sitting Good   Dynamic Sitting Fair +   Static Standing Fair +   Dynamic Standing Fair   Activity Tolerance   Activity Tolerance Patient tolerated treatment well   Nurse Made Aware ELISABETH [address] verbalized patient appropriate for therapy, made aware of therapy outcome and recommnedations   RUE Assessment   RUE Assessment X  (AROM functional range, (+) tremors,strength deficit)   RUE Strength   RUE Overall Strength Deficits  (3+/5)   LUE Assessment   LUE Assessment X  (AROM functional range, (+) tremors,strength deficit)   LUE Strength   LUE Overall Strength Deficits  (3+/5)   Hand Function   Gross Motor Coordination Impaired   Fine Motor Coordination Impaired   Sensation   Light Touch No apparent deficits  (BUEs)   Proprioception   Proprioception No apparent deficits  (BUEs)   Vision-Basic Assessment   Current Vision Wears glasses only for reading   Patient Visual Report Other (Comment)  (No significant changes reported)   Cognition   Overall Cognitive Status WFL   Arousal/Participation Alert; Responsive; Cooperative   Attention Attends with cues to redirect   Orientation Level Oriented to person;Oriented to place;Oriented to situation   Memory Decreased short term memory   Following Commands Follows all commands and directions without difficulty   AssessmentPatient is a [de-identified] y o  male seen for OT evaluation s/p admit to [facility] on 26/8/0013 w/Acute metabolic encephalopathy  Commorbidities affecting patient's functional performance at time of assessment include: Parkinson's disease diagnosed in 2005 s/p deep brain stimulator, drooling, dysphagia, prior CVA, HTN, HLD, and T2DM  Orders placed for OT evaluation and treatment  Performed at least two patient identifiers during session including name and wristband  Prior to admission, Patient and family reporting patient able to complete basic self care tasks with setp and supervision, min assist for LB ADLs, patient ambulates with RW (rollator) and is able to transfer on/ off lift chair without assistance  patient lives with his spouse in a  2 story house  with 4 KLAUS, family in the process of installing a ramp  Patient has assistance throughout the day, until 10 PM, has life alert and cell phone service  Patient has Home Care services 2x week thru [facility] for OT/PT and ST  Personal factors affecting patient at time of initial evaluation include: decreased initiation and engagement and difficulty performing ADLs  Upon evaluation, patient requires moderate assist for UB ADLs, maximal assist for LB ADLs, transfers and functional ambulation in room and bathroom with moderate assist, with the use of Rolling Walker   Occupational performance is affected by the following deficits: decreased functional use of BUEs, decreased muscle strength, degenerative arthritic joint changes, impaired gross motor coordination, impaired fine motor coordination, dynamic sit/ stand balance deficit with poor standing tolerance time for self care and functional mobility, decreased activity tolerance, decreased safety awareness and postural control and postural alignment deficit, requiring external assistance to complete transitional movements  Therapist completed  extensive additional review of medical records and additional review of physical, cognitive or psychosocial history, clinical examination identifying 5 or more performance deficits, clinical decision making of a high complexity , consistent with a high complexity level evaluation  Patient to benefit from continued Occupational Therapy treatment while in the hospital to address deficits as defined above and maximize level of functional independence with ADLs and functional mobility  Occupational Performance areas to address include: grooming , bathing/ shower, dressing, toilet hygiene, transfer to all surfaces, functional mobility, emergency response, medication routine/ management and IADLs: safety procedures  From OT standpoint, recommendation at time of d/c would be 24 hour supervision/ assist, Home with daily checks, Home with family support, [address] and [place] [facility]  Limitation Decreased UE strength;Decreased ADL status; Decreased endurance;Decreased fine motor control;Decreased self-care trans;Decreased high-level ADLs   Prognosis Good   Plan   Treatment Interventions ADL retraining;Functional transfer training;UE strengthening/ROM; Endurance training;Equipment evaluation/education; Fine motor coordination activities; Compensatory technique education;Continued evaluation; Energy conservation; Activityengagement   Goal Expiration Date 12/19/18   OT Frequency 2-3x/wk   Recommendation   OT Discharge Recommendation Home OT   OT - OK to Discharge Yes   Barthel Index   Feeding 5   Bathing 0   Grooming Score 0   Dressing Score 5   Bladder Score 5   Bowels Score 10   Toilet Use Score 5   Transfers (Bed/Chair) Score 10   Mobility (Level Surface) Score 0   Stairs Score 0   Barthel Index Score 40   Modified [place] Scale   Modified [place] Scale 4       Occupational Therapy goals: In 7-14 days:     1- Patient will verbalize and demonstrate use of energy conservation/ deep breathing technique and work simplification skills during functional activity with no verbal cues  2-Patient will verbalize and demonstrate good body mechanics and joint protection techniques during  ADLs/ IADLs with no verbal cues  3- Patient will increase OOB/ sitting tolerance to 2-4 hours per day for increased participation in self care and leisure tasks with no s/s of exertion  4-Patient will increase standing tolerance time to 5  minutes with unilateral UE support to complete sink level ADLs@ mod I level   5- Patient will increase sitting tolerance at edge of bed to 20 minutes to complete UB ADLs @ set up assist level  6- Patient will transfer bed to Chair / toilet at Set up assist level with AD as indicated  7- Patient will complete UB ADLs with set up assist  8- Patient will complete LB ADLs with min assist with the use of adaptive equipment  9- Patient will complete toileting hygiene with set up assist/ supervision for thoroughness    10-Patient/ Family  will demonstrate competency with UE Home Exercise Program

## 2018-12-05 NOTE — CONSULTS
Consultation - Neurology   Shaun Cooley  [address] y o  male MRN: [id number]  Unit/Bed#: -01 Encounter: [id number]      Assessment/Plan   Assessment:  Shaun Cooley  is a [address] y o  male with past medical history as below who presented to the ED on 12/4 for generalized weakness starting that morning  Initial labs revealed lactic acidosis and hypoglycemia, now resolved  Patient also recently treated for UTI with Ciprofloxacin with concern for resistance and started on IV antibiotics  VSS  Afebrile, no leukocytosis, prolactin normal  CT head negative for acute intracranial abnormality  Generalized weakness yesterday likely secondary to hypoglycemia, which is likely secondary to recently decreased sugar intake  No clinical concern for infarction  Plan:  -Continue Sinemet, dosing and scheduling corrected  -Family will bring in Requip XL 2mg qhs since it is not on formulary  -Continue aspirin and statin   -Investigate other causes for hypoglycemia per primary team  -Continue supportive care per primary team  -Continue to monitor and notify with changes    History of Present Illness     Reason for Consult / Principal Problem: Generalized weakness    HPI: Shaun Cooley  is a [address] y o  male with past medical history of Parkinson's disease diagnosed 2005 s/p b/l DBS, diabetes, hypertension who presented to the ED on 12/4 for altered mental status and generalized weakness  Daughter reports patient has been more sleepy than normal since Sunday  She then states he was more weak than normal yesterday and was unable to transition from his recliner to table to eat breakfast, which is unusual for him  Daughter notes blood glucose of 80 at that time, which is relatively low compared to his usual 110  Blood glucose noted to be 68 on arrival to ED  Labs also revealed lactic acidosis, now resolved  Patient also recently treated for UTI with Ciprofloxacin with concern for resistance and started on IV antibiotics  VSS  Afebrile, no leukocytosis, prolactin normal  CT head negative for acute intracranial abnormality  Today, patient is seen resting in chair with family at side  Patient and family report decrease sugar intact recently  Also report patient usually sleeps all day secondary to Sinemet  Report urinary frequency secondary to diuretic, which interrupts his nightly sleeping  Exam nonfocal  Patient doing well despite missing Sinemet yesterday and only having one dose today  Denies CP, SOB, headache, dizziness, vision changes, N/V, abdominal pain, weakness or numbness  Of note, patient follows with Neurology movement disorder specialist Dr Peg Lima and SANDRITA Tyler  Last seen 10/15/2018 for DBS setting adjustment  Noted there that main issue was tremor of left leg, drooling, and dysphagia  Inpatient consult to Neurology  Consult performed by: Vidhya Marroquin  Consult ordered by: Aidee Conteh          Review of Systems  12 point ROS performed, as stated above, all others negative      Historical Information   Past Medical History:   Diagnosis Date    Arthritis     BPH (benign prostatic hyperplasia)     Diabetes [facility] [address] [facility]     Heart problem     Movement disorder     [facility]'s disease [facility] [address] [facility]     Stroke ([facility] [address] )      Past Surgical History:   Procedure Laterality Date    APPENDECTOMY      CARDIAC DEFIBRILLATOR PLACEMENT      CARDIAC DEFIBRILLATOR PLACEMENT      CHOLECYSTECTOMY      CORONARY ANGIOPLASTY WITH STENT PLACEMENT      DEEP BRAIN STIMULATOR PLACEMENT      EAR SURGERY      GALLBLADDER SURGERY      KNEE SURGERY      SKIN CANCER EXCISION       Social History   History   Alcohol Use No     History   Drug Use No     History   Smoking Status    Former Smoker   Smokeless Tobacco    Never Used     Family History:   Family History   Problem Relation Age of Onset    Cancer Mother     Diabetes Father     Parkinsonism Father     Heart attack Father        Review of previous medical records was completed  Meds/Allergies   all current active meds have been reviewed and current meds:   Current Facility-Administered Medications   Medication Dose Route Frequency    acetaminophen (TYLENOL) tablet 650 mg  650 mg Oral Q6H PRN    aspirin chewable tablet 81 mg  81 mg Oral QPM    atorvastatin (LIPITOR) tablet 80 mg  80 mg Oral QPM    carbidopa-levodopa (SINEMET CR)  mg per ER tablet 1 tablet  1 tablet Oral HS    carbidopa-levodopa (SINEMET)  mg per tablet 1 tablet  1 tablet Oral TID    cefTRIAXone (ROCEPHIN) 1,000 mg in dextrose 5 % 50 mL IVPB  1,000 mg Intravenous Q24H    dextrose infusion 10 %  75 mL/hr Intravenous Continuous    enoxaparin (LOVENOX) subcutaneous injection 40 mg  40 mg Subcutaneous Daily    insulin lispro (HumaLOG) 100 units/mL subcutaneous injection 1-5 Units  1-5 Units Subcutaneous TID AC    insulin lispro (HumaLOG) 100 units/mL subcutaneous injection 1-5 Units  1-5 Units Subcutaneous HS    ipratropium-albuterol (DUO-NEB) 0 5-2 5 mg/3 mL inhalation solution 3 mL  3 mL Nebulization BID    magnesium oxide (MAG-OX) tablet 400 mg  400 mg Oral Once    metoprolol tartrate (LOPRESSOR) partial tablet 12 5 mg  12 5 mg Oral Q12H SARA       Allergies   Allergen Reactions    Clopidogrel Rash     PLAVIX       Objective   Vitals:Blood pressure 154/87, pulse 78, temperature 97 8 °F (36 6 °C), temperature source Oral, resp  rate 18, height 5' 10" (1 778 m), weight 90 kg (198 lb 6 6 oz), SpO2 93 %  ,Body mass index is 28 47 kg/m²  Intake/Output Summary (Last 24 hours) at 12/05/18 1039  Last data filed at 12/05/18 0506   Gross per 24 hour   Intake             1275 ml   Output             1200 ml   Net               75 ml       Invasive Devices:    Invasive Devices     Peripheral Intravenous Line            Peripheral IV 12/04/18 Left Arm 1 day    Peripheral IV 12/04/18 Right Forearm less than 1 day          Drain            External Urinary Catheter Small less than 1 day Physical Exam   Constitutional: No distress  HENT:   Head: Normocephalic and atraumatic  Eyes: Pupils are equal, round, and reactive to light  EOM are normal    Neck: Normal range of motion  Neck supple  Cardiovascular: Normal rate and regular rhythm  Pulmonary/Chest: Breath sounds normal    Neurological: He has a normal Finger-Nose-Finger Test and a normal Heel to [employer]    Reflex Scores:       Tricep reflexes are 1+ on the right side and 1+ on the left side  Bicep reflexes are 2+ on the right side and 1+ on the left side  Brachioradialis reflexes are 1+ on the right side and 1+ on the left side  Patellar reflexes are 0 on the right side and 0 on the left side  Achilles reflexes are 0 on the right side and 0 on the left side  Skin: Skin is warm and dry  He is not diaphoretic  Psychiatric: His behavior is normal  Thought content normal    Flat affect     Neurologic Exam     Mental Status   Patient is alert throughout exam and oriented to person, place, and time  Hypophonia, no dysarthria or difficulty with word finding  Cranial Nerves     CN III, IV, VI   Pupils are equal, round, and reactive to light  Extraocular motions are normal      CN VII   Facial expression full, symmetric  CN VIII   CN VIII normal      CN XI   CN XI normal    EOM not fluid  Slight decrease in nasolabial fold on left with slight volitional weakness, family reports secondary to prior stroke  Motor Exam     Strength   Strength 5/5 except as noted     Sinemet given ~2 hours prior to exam  Mild increased tone BLE, L>R  B/l shin atrophy    Left hip 4+/5     Sensory Exam   Light touch normal    Proprioception intact BLE  Mild decrease to vibration BLE     Gait, Coordination, and Reflexes     Coordination   Finger to nose coordination: normal  Heel to shin coordination: normal    Tremor   Intention tremor: present (R>>L)    Reflexes   Right brachioradialis: 1+  Left brachioradialis: 1+  Right biceps: 2+  Left biceps: 1+  Right triceps: 1+  Left triceps: 1+  Right patellar: 0  Left patellar: 0  Right achilles: 0  Left achilles: 0  Resting LLE of moderate amplitude and moderate frequency       Lab Results: I have personally reviewed pertinent reports       Recent Results (from the past 24 hour(s))   Fingerstick Glucose (POCT)    Collection Time: 12/04/18 12:17 PM   Result Value Ref Range    POC Glucose 57 (L) 65 - 140 mg/dl   Fingerstick Glucose (POCT)    Collection Time: 12/04/18 12:47 PM   Result Value Ref Range    POC Glucose 124 65 - 140 mg/dl   Lactic acid, plasma    Collection Time: 12/04/18  1:05 PM   Result Value Ref Range    LACTIC ACID 3 7 (HH) 0 5 - 2 0 mmol/L   Procalcitonin    Collection Time: 12/04/18  2:46 PM   Result Value Ref Range    Procalcitonin <0 05 <=0 25 ng/ml   Vitamin B12    Collection Time: 12/04/18  2:46 PM   Result Value Ref Range    Vitamin B-12 613 100 - 900 pg/mL   Folate    Collection Time: 12/04/18  2:46 PM   Result Value Ref Range    Folate 13 3 3 1 - 17 5 ng/mL   Troponin I    Collection Time: 12/04/18  2:46 PM   Result Value Ref Range    Troponin I 0 02 <=0 04 ng/mL   Lactic acid, plasma    Collection Time: 12/04/18  2:46 PM   Result Value Ref Range    LACTIC ACID 3 2 (HH) 0 5 - 2 0 mmol/L   T4, free    Collection Time: 12/04/18  2:46 PM   Result Value Ref Range    Free T4 1 06 0 76 - 1 46 ng/dL   Fingerstick Glucose (POCT)    Collection Time: 12/04/18  4:47 PM   Result Value Ref Range    POC Glucose 65 65 - 140 mg/dl   Lactic acid, plasma    Collection Time: 12/04/18  4:55 PM   Result Value Ref Range    LACTIC ACID 2 4 (HH) 0 5 - 2 0 mmol/L   Troponin I    Collection Time: 12/04/18  5:47 PM   Result Value Ref Range    Troponin I 0 02 <=0 04 ng/mL   Fingerstick Glucose (POCT)    Collection Time: 12/04/18  5:50 PM   Result Value Ref Range    POC Glucose 158 (H) 65 - 140 mg/dl   Fingerstick Glucose (POCT)    Collection Time: 12/04/18  9:29 PM   Result Value Ref Range POC Glucose 145 (H) 65 - 140 mg/dl   Comprehensive metabolic panel    Collection Time: 12/05/18  5:01 AM   Result Value Ref Range    Sodium 139 136 - 145 mmol/L    Potassium 4 0 3 5 - 5 3 mmol/L    Chloride 103 100 - 108 mmol/L    CO2 29 21 - 32 mmol/L    ANION GAP 7 4 - 13 mmol/L    BUN 16 5 - 25 mg/dL    Creatinine 1 13 0 60 - 1 30 mg/dL    Glucose 240 (H) 65 - 140 mg/dL    Glucose, Fasting 240 (H) 65 - 99 mg/dL    Calcium 9 0 8 3 - 10 1 mg/dL    AST 17 5 - 45 U/L    ALT 16 12 - 78 U/L    Alkaline Phosphatase 76 46 - 116 U/L    Total Protein 6 5 6 4 - 8 2 g/dL    Albumin 3 0 (L) 3 5 - 5 0 g/dL    Total Bilirubin 2 40 (H) 0 20 - 1 00 mg/dL    eGFR 61 ml/min/1 73sq m   Magnesium    Collection Time: 12/05/18  5:01 AM   Result Value Ref Range    Magnesium 1 6 1 6 - 2 6 mg/dL   Phosphorus    Collection Time: 12/05/18  5:01 AM   Result Value Ref Range    Phosphorus 3 2 2 3 - 4 1 mg/dL   CBC and differential    Collection Time: 12/05/18  5:01 AM   Result Value Ref Range    WBC 5 09 4 31 - 10 16 Thousand/uL    RBC 3 84 (L) 3 88 - 5 62 Million/uL    Hemoglobin 12 0 12 0 - 17 0 g/dL    Hematocrit 36 9 36 5 - 49 3 %    MCV 96 82 - 98 fL    MCH 31 3 26 8 - 34 3 pg    MCHC 32 5 31 4 - 37 4 g/dL    RDW 13 0 11 6 - 15 1 %    MPV 9 6 8 9 - 12 7 fL    Platelets 011 925 - 148 Thousands/uL    nRBC 0 /100 WBCs    Neutrophils Relative 59 43 - 75 %    Immat GRANS % 0 0 - 2 %    Lymphocytes Relative 25 14 - 44 %    Monocytes Relative 14 (H) 4 - 12 %    Eosinophils Relative 1 0 - 6 %    Basophils Relative 1 0 - 1 %    Neutrophils Absolute 2 99 1 85 - 7 62 Thousands/µL    Immature Grans Absolute 0 02 0 00 - 0 20 Thousand/uL    Lymphocytes Absolute 1 26 0 60 - 4 47 Thousands/µL    Monocytes Absolute 0 72 0 17 - 1 22 Thousand/µL    Eosinophils Absolute 0 06 0 00 - 0 61 Thousand/µL    Basophils Absolute 0 04 0 00 - 0 10 Thousands/µL   Fingerstick Glucose (POCT)    Collection Time: 12/05/18  6:30 AM   Result Value Ref Range    POC Glucose 226 (H) 65 - 140 mg/dl     Imaging Studies: I have personally reviewed pertinent reports  and I have personally reviewed pertinent films in PACS  EKG, Pathology, and Other Studies: I have personally reviewed pertinent reports      VTE Prophylaxis: Enoxaparin (Lovenox)

## 2018-12-05 NOTE — PLAN OF CARE
Problem: SLP ADULT - SWALLOWING, IMPAIRED  Goal: Initial SLP swallow eval performed  Outcome: Completed Date Met: 12/05/18    Goal: Advance to least restrictive diet without signs or symptoms of aspiration for planned discharge setting  See evaluation for individualized goals    Patient will tolerate the safest and least restrictive diet without overt s/sx aspiration x100%      Outcome: Progressing

## 2018-12-05 NOTE — TELEPHONE ENCOUNTER
Noted  On with Ct is typically fine   Reviewed ER notes as he was at [facility] where protocols are in place

## 2018-12-05 NOTE — PLAN OF CARE
Problem: PHYSICAL THERAPY ADULT  Goal: Performs mobility at highest level of function for planned discharge setting  See evaluation for individualized goals  Treatment/Interventions: Functional transfer training, LE strengthening/ROM, Elevations, Therapeutic exercise, Endurance training, Patient/family training, Equipment eval/education, Bed mobility, Gait training, Spoke to nursing, OT, Family          See flowsheet documentation for full assessment, interventions and recommendations  Prognosis: Good  Problem List: Decreased strength, Decreased mobility, Impaired balance, Decreased coordination, Decreased cognition, Decreased skin integrity  Assessment: Pt is [de-identified] y o  male seen for PT evaluation s/p admit to [facility] on 34/4/6896 w/ Acute metabolic encephalopathy  PT consulted to assess pt's functional mobility and d/c needs  Order placed for PT eval and tx, w/ up w/ A order  Performed at least 2 patient identifiers during session: Name and wristband  Comorbidities affecting pt's physical performance at time of assessment include: arthritis, BPH, diabetes, heart problem, movement disorder, Parkinson's Disease, stroke  PTA, pt was requiring A for mobility, ambulates household distances, has 4 KLAUS, lives w/ spouse in two level house with 1st floor set-up and retired  Pt receives assistance from personal care attendant/family M-W-F for 7 1/2 hrs/wk + home PT/OT/ST/RN 2x/wk  Personal factors affecting pt at time of IE include: inaccessible home environment, ambulating w/ assistive device, stairs to enter home, inability to navigate level surfaces w/o external assistance, decreased cognition, positive fall history, inability to perform IADLs, inability to perform ADLs and inability to live alone   Please find objective findings from PT assessment regarding body systems outlined above with impairments and limitations including weakness, impaired balance, impaired coordination, gait deviations, fall risk, decreased skin integrity and decreased cognition  The following objective measures performed on IE also reveal limitations: Barthel Index: 35/100 and Modified [place]: 4 (moderate/severe disability)  Pt's clinical presentation is currently unstable/unpredictable seen in pt's presentation of unstable medical status requiring ongoing medical management/monitoring, evident in need for assist w/ all phases of mobility when usually mobilizing independently, need for input for mobility technique/safety, positive fall history and patient presents with multiple co-morbidities impacting PT intervention  Pt to benefit from continued PT tx to address deficits as defined above and maximize level of functional independent mobility and consistency  From PT/mobility standpoint, recommendation at time of d/c would be home with family/caregiver support - 24 hour supervision/assist and Home PT pending progress in order to facilitate return to PLOF  Barriers to Discharge: Inaccessible home environment     Recommendation: 24 hour supervision/assist, Home PT, Home with family support     PT - OK to Discharge: (S) No    See flowsheet documentation for full assessment

## 2018-12-05 NOTE — PLAN OF CARE
Problem: OCCUPATIONAL THERAPY ADULT  Goal: Performs self-care activities at highest level of function for planned discharge setting  See evaluation for individualized goals  Treatment Interventions: ADL retraining, Functional transfer training, UE strengthening/ROM, Endurance training, Equipment evaluation/education, Fine motor coordination activities, Compensatory technique education, Continued evaluation, Energy conservation, Activityengagement          See flowsheet documentation for full assessment, interventions and recommendations  Limitation: Decreased UE strength, Decreased ADL status, Decreased endurance, Decreased fine motor control, Decreased self-care trans, Decreased high-level ADLs  Prognosis: Good  Assessment: Patient is a [de-identified] y o  male seen for OT evaluation s/p admit to [address] [facility] on 52/1/3969 w/Acute metabolic encephalopathy  Commorbidities affecting patient's functional performance at time of assessment include: Parkinson's disease diagnosed in 2005 s/p deep brain stimulator, drooling, dysphagia, prior CVA, HTN, HLD, and T2DM  Orders placed for OT evaluation and treatment  Performed at least two patient identifiers during session including name and wristband  Prior to admission, Patient and family reporting patient able to complete basic self care tasks with setp and supervision, min assist for LB ADLs, patient ambulates with RW (rollator) and is able to transfer on/ off lift chair without assistance  patient lives with his spouse in a  2 story house  with 4 KLAUS, family in the process of installing a ramp  Patient has assistance throughout the day, until 10 PM, has life alert and cell phone service  Patient has Home Care services 2x week thru [facility] for OT/PT and ST  Personal factors affecting patient at time of initial evaluation include: decreased initiation and engagement and difficulty performing ADLs   Upon evaluation, patient requires moderate assist for UB ADLs, maximal assist for LB ADLs, transfers and functional ambulation in room and bathroom with moderate assist, with the use of Rolling Walker  Occupational performance is affected by the following deficits: decreased functional use of BUEs, decreased muscle strength, degenerative arthritic joint changes, impaired gross motor coordination, impaired fine motor coordination, dynamic sit/ stand balance deficit with poor standing tolerance time for self care and functional mobility, decreased activity tolerance, decreased safety awareness and postural control and postural alignment deficit, requiring external assistance to complete transitional movements  Therapist completed  extensive additional review of medical records and additional review of physical, cognitive or psychosocial history, clinical examination identifying 5 or more performance deficits, clinical decision making of a high complexity , consistent with a high complexity level evaluation  Patient to benefit from continued Occupational Therapy treatment while in the hospital to address deficits as defined above and maximize level of functional independence with ADLs and functional mobility  OT Discharge Recommendation: Home OT  OT - OK to Discharge:  Yes

## 2018-12-05 NOTE — SPEECH THERAPY NOTE
Speech-Language Pathology Bedside Swallow Evaluation      Patient Name: Fredi Finn  Today's Date: 12/5/2018       Impression  Pt presents with severe oral and pharyngeal dysphagia, somewhat deteriorated from his presentation on outpatient video swallow performed by me on 11/1/18; he is likely to be both silently and overtly aspirating liquids and solids  D/w the pt, his dtr Mariselaalpa Delgadillo, and his wife Kandi at length  They understand the risk of aspiration and pneumonia, and would like for the patient to continue to eat and drink what he likes given his current quality of life  They do not wish to consider alternative means of nutrition on either a temporary or permanent basis  Recommendations  In light of the patient and family's wishes, recommend Dysphagia 2/Mechanical Soft and Thin Liquid diet  Thin liquids via cup -- no straws  Assist pt with all PO  Meds whole, 1 at a time, with liquid  Aspiration precautions including upright positioning for all PO  ST will continue to follow as needed for dysphagia tx including continued patient and family education, and reinforcement of strategies  The patient and family may benefit from a palliative care consult to assist with planning of future care        Problem List  Patient Active Problem List   Diagnosis    Parkinson's disease with use of electrical brain stimulation ([facility] )    S/P deep brain stimulator placement    Drooling    Dysphagia    Pancreatic cyst    Generalized weakness    Acute metabolic encephalopathy    Type 2 diabetes mellitus ([facility] )    HTN (hypertension)    History of CVA (cerebrovascular accident)       Past Medical History  Past Medical History:   Diagnosis Date    Arthritis     BPH (benign prostatic hyperplasia)     Diabetes ([facility] )     Heart problem     Movement disorder     Parkinson's disease ([facility] )     Stroke [facility])        Past Surgical History  Past Surgical History:   Procedure Laterality Date    APPENDECTOMY      CARDIAC DEFIBRILLATOR PLACEMENT      CARDIAC DEFIBRILLATOR PLACEMENT      CHOLECYSTECTOMY      CORONARY ANGIOPLASTY WITH STENT PLACEMENT      DEEP BRAIN STIMULATOR PLACEMENT      EAR SURGERY      GALLBLADDER SURGERY      KNEE SURGERY      SKIN CANCER EXCISION             Current Medical Status  Pt is a [de-identified] y o  male who presented to [facility] with toxic metabolic encephalopathy; workup is ongoing  He is known to this service from a prior outpatient video swallow study performed on 11/1/18, which revealed significant dysphagia; however, at that time, the patient and family wished to continue intake by mouth despite the risks, and recommendations were made for a Dysphagia 2/Mechanical Soft and Nectar-Thick Liquid diet with strategies including small bites and sips, and volitional cough/throat clear after every 2-3 boluses 2/2 significant residuals  At the time of this evaluation, the pt was A+A, denied pain or other acute complaints apart from being hungry  He appeared weaker and more fatigued than he did when last seen for aforementioned OP video swallow study  His wife Loren Myers and dtr/POJESSICA De La Fuente were present for the session  They state that the patient has been eating a Dysphagia 2/Mechanical Soft and Thin Liquid diet at home (he refuses to accept thickened liquids) and has been participating in home health speech therapy for swallowing exercises and strategies  He does still cough and choke frequently at meals  Imaging Studies:  CXR 12/4 No acute cardiopulmonary disease  CT Head 12/4 No acute intracranial abnormality  Microangiopathic changes        Swallow Information   Current Risks for Dysphagia & Aspiration: AMS, known history of dysphagia     Current Symptoms/Concerns: change in respiratory status    Current Diet: NPO      Baseline Diet: mechanically altered/level 2 diet and thin liquids      Baseline Assessment   Behavior/Cognition: alert, oriented x4    Speech/Language Status: able to participate in conversation albeit with slowed responses; able to follow commands    Patient Positioning: upright in chair    Pain Status/Interventions/Response to Interventions:  No report of or nonverbal indications of pain  Swallow Mechanism Exam   Facial: right facial droop; open-mouth posture at baseline with pooled saliva visible throughout anterior oral cavity; this is a significant change from when last seen by this clinician on 11/1   Labial: decreased ROM right side; B/L decreased strength  Lingual: right sided tongue deviation and B/L decreased strength/coordination, worsened since last seen by this clinician on 11/1  Velum: symmetrical  Mandible: decreased strength  Dentition: limited dentition  Vocal quality: somewhat weak/reduced intensity  Volitional Cough: weak   Swallow Mechanics: delayed, effortful hyolaryngeal elevation with dry swallow        Consistencies Assessed and Performance   Consistencies Administered: thin liquids, nectar thick, puree and soft solids  Specific materials administered included ice water via spoon and cup, nectar-thick orange juice via spoon, chocolate pudding, and tami crackers crumbled into small pieces in chocolate pudding  Oral Stage: moderate-severe  Prolonged oral manipulation with all textures, including prolonged mastication of soft solids  Lingual pumping with all textures  Suspect piecemeal transit of puree and soft solids  Pharyngeal Stage: severe  Effortful, delayed swallow initiation with all textures  Multiple swallows with puree and soft solids  Delayed wet cough with ~50% of trials of all textures  Lengthy discussion held with pt, his wife, and his dtr re: results of evaluation, risks of aspiration and PNA, progressive disease process  Pt and family verbally indicated understanding of all information   They reiterated that they understand the physical risk of continued intake by mouth and that they would like to continue with intake by mouth despite these risks  They also reiterated that they do not wish to consider alternative means of nutrition on either a temporary or permanent basis  This is reasonable given the patient's current quality of life and progression of Parkinson's disease  The patient and family may benefit from a palliative care consult to assist with planning of future care        Esophageal Concerns: none reported    Risk for Aspiration:  High    Recommendations: mechanically altered/level 2 diet and thin liquids in light of family wishes    Recommended Form of Meds: whole with liquid, 1 at a time    Aspiration precautions and compensatory swallowing strategies: upright posture, only feed when fully alert, slow rate of feeding, small bites/sips and no straws    Results Reviewed with: patient, RN, MD and family     Consider referral to: Palliative care    Frequency of treatment: As needed    Patient Stated Goal: "Eat"    Dysphagia Goals per SLP: Patient will tolerate the safest and least restrictive diet without overt s/sx aspiration x100%    Pt/Family Education: See above    Speech Therapy Prognosis   Prognosis: poor    Prognosis Considerations: prior medical history, progressive disease process, medically fragile status and therapeutic potential      [Impressions/Recommendations located at top]

## 2018-12-05 NOTE — PROGRESS NOTES
[facility] Internal Medicine Progress Note  Patient: Kat Mi  [de-identified] y o  male   MRN: [id number]  PCP: Soren Johnson MD  Unit/Bed#: -01 Encounter: [id number]  Date Of Visit: 12/05/18    Assessment:    Principal Problem:    Acute metabolic encephalopathy  Active Problems:    Parkinson's disease with use of electrical brain stimulation (HCC)    Drooling    Dysphagia    Pancreatic cyst    Generalized weakness    Type 2 diabetes mellitus ([facility] )    HTN (hypertension)    History of CVA (cerebrovascular accident)      Plan:    · 1  Acute toxic metabolic encephalopathy- possibly secondary to hypoglycemia due to poor oral intake  Patient had no signs of any acute infection  Neurology has evaluated patient and does not think he had an infarction  Will continue supportive care for now  · 2  Patient has high risk of aspiration pneumonia- seen by speech multiple times in the past   Family agrees with all the risk and does not want a PEG  On Dysphagia diet  · 3  History of parkinsons with brain stimulator- on simemet  · 4  CT abdomen incidently showed some pancreatic cysts- patient may need possible EUS to evaluate but due to patients condition and coormorbidities  Will hold off for now  · 5  DM- on ISS       VTE Pharmacologic Prophylaxis:   Pharmacologic: Enoxaparin (Lovenox)  Mechanical VTE Prophylaxis in Place: Yes    Patient Centered Rounds: I have performed bedside rounds with nursing staff today  Discussions with Specialists or Other Care Team Provider:     Education and Discussions with Family / Patient:     Time Spent for Care: 20 minutes  More than 50% of total time spent on counseling and coordination of care as described above      Current Length of Stay: 0 day(s)    Current Patient Status: Inpatient   Certification Statement: The patient will continue to require additional inpatient hospital stay due to acute toxic metabolic encephalopathy    Discharge Plan / Estimated Discharge Date: once encephalopathy improves    Code Status: Level 3 - DNAR and DNI      Subjective:   Patient seen and examined at bedside  Patient has no new complaints  Objective:     Vitals:   Temp (24hrs), Av 8 °F (36 6 °C), Min:97 5 °F (36 4 °C), Max:98 2 °F (36 8 °C)    Temp:  [97 5 °F (36 4 °C)-98 2 °F (36 8 °C)] 97 8 °F (36 6 °C)  HR:  [60-78] 78  Resp:  [-18] 18  BP: (127-160)/(70-87) 154/87  SpO2:  [93 %-97 %] 93 %  Body mass index is 28 47 kg/m²  Input and Output Summary (last 24 hours): Intake/Output Summary (Last 24 hours) at 18 1347  Last data filed at 18 1300   Gross per 24 hour   Intake             1275 ml   Output             2775 ml   Net            -1500 ml       Physical Exam:     Physical Exam   Constitutional: He appears well-developed and well-nourished  HENT:   Head: Normocephalic and atraumatic  Eyes: Pupils are equal, round, and reactive to light  Conjunctivae and EOM are normal    Neck: Normal range of motion  Neck supple  No JVD present  No tracheal deviation present  No thyromegaly present  Cardiovascular: Normal rate, regular rhythm and normal heart sounds  Exam reveals no gallop and no friction rub  No murmur heard  Pulmonary/Chest: Effort normal and breath sounds normal  No respiratory distress  He has no wheezes  He has no rales  Abdominal: Soft  Bowel sounds are normal  There is no tenderness  There is no rebound and no guarding  Musculoskeletal: Normal range of motion  He exhibits no edema  Neurological: He is alert  Skin: Skin is warm and dry  No rash noted  No erythema  No pallor  Vitals reviewed            Additional Data:     Labs:      Results from last 7 days  Lab Units 18  0501   WBC Thousand/uL 5 09   HEMOGLOBIN g/dL 12 0   HEMATOCRIT % 36 9   PLATELETS Thousands/uL 151   NEUTROS PCT % 59   LYMPHS PCT % 25   MONOS PCT % 14*   EOS PCT % 1       Results from last 7 days  Lab Units 18  0501   POTASSIUM mmol/L 4 0   CHLORIDE mmol/L 103   CO2 mmol/L 29   BUN mg/dL 16   CREATININE mg/dL 1 13   CALCIUM mg/dL 9 0   ALK PHOS U/L 76   ALT U/L 16   AST U/L 17           * I Have Reviewed All Lab Data Listed Above  * Additional Pertinent Lab Tests Reviewed: [facility] Admission Reviewed    Imaging:    Imaging Reports Reviewed Today Include:   Imaging Personally Reviewed by Myself Includes:      Recent Cultures (last 7 days):           Last 24 Hours Medication List:     Current Facility-Administered Medications:  acetaminophen 650 mg Oral Q6H PRN VIVIAN Amanda    aspirin 81 mg Oral QPM VIVIAN Amanda    atorvastatin 80 mg Oral QPM VIVIAN Amanda    carbidopa-levodopa 1 tablet Oral HS Mikey Kelly PA-C    carbidopa-levodopa 1 tablet Oral TID Mikey Kelly PA-C    cefTRIAXone 1,000 mg Intravenous Q24H VIVIAN Amanda Last Rate: 1,000 mg (12/04/18 1725)   dextrose 75 mL/hr Intravenous Continuous VIVIAN Amanda Last Rate: 75 mL/hr (12/05/18 0542)   enoxaparin 40 mg Subcutaneous Daily VIVIAN Amanda    insulin lispro 1-5 Units Subcutaneous TID AC VIVIAN Amanda    insulin lispro 1-5 Units Subcutaneous HS VIVIAN Amanda    ipratropium-albuterol 3 mL Nebulization BID Maegan Bhakta MD    magnesium oxide 400 mg Oral Once VIVIAN Amanda    metoprolol tartrate 12 5 mg Oral Q12H [address], CRNP         Today, Patient Was Seen By: Maegan Bhakta MD    ** Please Note: This note has been constructed using a voice recognition system   **

## 2018-12-06 VITALS
WEIGHT: 198.41 LBS | OXYGEN SATURATION: 100 % | HEART RATE: 63 BPM | TEMPERATURE: 97.5 F | HEIGHT: 70 IN | DIASTOLIC BLOOD PRESSURE: 56 MMHG | SYSTOLIC BLOOD PRESSURE: 119 MMHG | BODY MASS INDEX: 28.41 KG/M2 | RESPIRATION RATE: 17 BRPM

## 2018-12-06 LAB
ANION GAP SERPL CALCULATED.3IONS-SCNC: 8 MMOL/L (ref 4–13)
BASOPHILS # BLD AUTO: 0.04 THOUSANDS/ΜL (ref 0–0.1)
BASOPHILS NFR BLD AUTO: 1 % (ref 0–1)
BUN SERPL-MCNC: 12 MG/DL (ref 5–25)
CALCIUM SERPL-MCNC: 9.2 MG/DL (ref 8.3–10.1)
CHLORIDE SERPL-SCNC: 102 MMOL/L (ref 100–108)
CO2 SERPL-SCNC: 28 MMOL/L (ref 21–32)
CREAT SERPL-MCNC: 1.26 MG/DL (ref 0.6–1.3)
EOSINOPHIL # BLD AUTO: 0.1 THOUSAND/ΜL (ref 0–0.61)
EOSINOPHIL NFR BLD AUTO: 2 % (ref 0–6)
ERYTHROCYTE [DISTWIDTH] IN BLOOD BY AUTOMATED COUNT: 12.9 % (ref 11.6–15.1)
GFR SERPL CREATININE-BSD FRML MDRD: 54 ML/MIN/1.73SQ M
GLUCOSE SERPL-MCNC: 280 MG/DL (ref 65–140)
GLUCOSE SERPL-MCNC: 298 MG/DL (ref 65–140)
GLUCOSE SERPL-MCNC: 306 MG/DL (ref 65–140)
GLUCOSE SERPL-MCNC: 343 MG/DL (ref 65–140)
HCT VFR BLD AUTO: 38.3 % (ref 36.5–49.3)
HGB BLD-MCNC: 12.6 G/DL (ref 12–17)
IMM GRANULOCYTES # BLD AUTO: 0.02 THOUSAND/UL (ref 0–0.2)
IMM GRANULOCYTES NFR BLD AUTO: 0 % (ref 0–2)
LYMPHOCYTES # BLD AUTO: 1.26 THOUSANDS/ΜL (ref 0.6–4.47)
LYMPHOCYTES NFR BLD AUTO: 26 % (ref 14–44)
MCH RBC QN AUTO: 31.3 PG (ref 26.8–34.3)
MCHC RBC AUTO-ENTMCNC: 32.9 G/DL (ref 31.4–37.4)
MCV RBC AUTO: 95 FL (ref 82–98)
MONOCYTES # BLD AUTO: 0.73 THOUSAND/ΜL (ref 0.17–1.22)
MONOCYTES NFR BLD AUTO: 15 % (ref 4–12)
NEUTROPHILS # BLD AUTO: 2.72 THOUSANDS/ΜL (ref 1.85–7.62)
NEUTS SEG NFR BLD AUTO: 56 % (ref 43–75)
NRBC BLD AUTO-RTO: 0 /100 WBCS
PLATELET # BLD AUTO: 144 THOUSANDS/UL (ref 149–390)
PMV BLD AUTO: 9.6 FL (ref 8.9–12.7)
POTASSIUM SERPL-SCNC: 3.7 MMOL/L (ref 3.5–5.3)
RBC # BLD AUTO: 4.03 MILLION/UL (ref 3.88–5.62)
SODIUM SERPL-SCNC: 138 MMOL/L (ref 136–145)
WBC # BLD AUTO: 4.87 THOUSAND/UL (ref 4.31–10.16)

## 2018-12-06 PROCEDURE — 80048 BASIC METABOLIC PNL TOTAL CA: CPT | Performed by: INTERNAL MEDICINE

## 2018-12-06 PROCEDURE — 82948 REAGENT STRIP/BLOOD GLUCOSE: CPT

## 2018-12-06 PROCEDURE — 85025 COMPLETE CBC W/AUTO DIFF WBC: CPT | Performed by: INTERNAL MEDICINE

## 2018-12-06 PROCEDURE — 99239 HOSP IP/OBS DSCHRG MGMT >30: CPT | Performed by: INTERNAL MEDICINE

## 2018-12-06 PROCEDURE — 97116 GAIT TRAINING THERAPY: CPT

## 2018-12-06 RX ORDER — INSULIN GLARGINE 100 [IU]/ML
20 INJECTION, SOLUTION SUBCUTANEOUS
Qty: 2 PEN | Refills: 0 | Status: SHIPPED | OUTPATIENT
Start: 2018-12-06 | End: 2019-04-13 | Stop reason: HOSPADM

## 2018-12-06 RX ORDER — INSULIN GLARGINE 100 [IU]/ML
20 INJECTION, SOLUTION SUBCUTANEOUS
Qty: 5 PEN | Refills: 0 | Status: SHIPPED | OUTPATIENT
Start: 2018-12-06 | End: 2018-12-06

## 2018-12-06 RX ADMIN — CARBIDOPA AND LEVODOPA 1 TABLET: 25; 100 TABLET ORAL at 14:04

## 2018-12-06 RX ADMIN — INSULIN LISPRO 2 UNITS: 100 INJECTION, SOLUTION INTRAVENOUS; SUBCUTANEOUS at 06:47

## 2018-12-06 RX ADMIN — CARBIDOPA AND LEVODOPA 1 TABLET: 25; 100 TABLET ORAL at 05:08

## 2018-12-06 RX ADMIN — ENOXAPARIN SODIUM 40 MG: 40 INJECTION SUBCUTANEOUS at 09:14

## 2018-12-06 RX ADMIN — CARBIDOPA AND LEVODOPA 1 TABLET: 25; 100 TABLET ORAL at 09:13

## 2018-12-06 RX ADMIN — DEXTROSE 75 ML/HR: 10 SOLUTION INTRAVENOUS at 05:08

## 2018-12-06 RX ADMIN — INSULIN LISPRO 3 UNITS: 100 INJECTION, SOLUTION INTRAVENOUS; SUBCUTANEOUS at 12:16

## 2018-12-06 RX ADMIN — METOPROLOL TARTRATE 12.5 MG: 25 TABLET ORAL at 09:13

## 2018-12-06 NOTE — PLAN OF CARE
Problem: PHYSICAL THERAPY ADULT  Goal: Performs mobility at highest level of function for planned discharge setting  See evaluation for individualized goals  Treatment/Interventions: Functional transfer training, LE strengthening/ROM, Elevations, Therapeutic exercise, Endurance training, Patient/family training, Equipment eval/education, Bed mobility, Gait training, Spoke to nursing, OT, Family          See flowsheet documentation for full assessment, interventions and recommendations  Outcome: Progressing  Prognosis: Good  Problem List: Decreased strength, Decreased endurance, Impaired balance, Decreased mobility, Decreased coordination, Decreased cognition, Decreased skin integrity  Assessment: Pt seen for PT treatment session this date with interventions consisting of gait training w/ emphasis on improving pt's ability to ambulate level surfaces x 85 feet  with min A provided by therapist with RW  Pt required verbal cues as indicated to correct gait deficits in order to provide for increased foot clearance, increased gait efficiency and stability and decreased fall risk  Pt agreeable to PT treatment session upon arrival, pt found seated OOB in recliner, in no apparent distress  In comparison to previous session, pt with improvements in ambulation distance with RW   Post session: pt returned back to recliner, chair alarm engaged, all needs in reach and RN notified of session findings/recommendations Continue to recommend Home PT and home with family support 24/7 at time of d/c in order to maximize pt's functional independence and safety w/ mobility  Pt continues to be functioning below baseline level, and remains limited 2* factors listed above and including risk for falls  PT will continue to see pt while here in order to address the deficits listed above and provide interventions consistent w/ POC in effort to achieve STGs    Barriers to Discharge: Inaccessible home environment     Recommendation: 24 hour supervision/assist, Home PT, Home with family support     PT - OK to Discharge: (S) No    See flowsheet documentation for full assessment

## 2018-12-06 NOTE — SOCIAL WORK
LOS 1  LACE 70  Patient is not a 30 day readmission  CM met with pt and family at bedside to discuss DCP  Pt lives with wife in [place] in a 2 story home with 4 KLAUS and plan for a ramp to be installed  He has a walker, rollator, lift chair and wheelchair at home  He has 2 daughters who assist with ADLs during the day and he also has a HHA through [facility] 2 hours a day 3 times a week as well as VNA through [facility]  He has a LW/AD, copy requested  He uses [facility] in [place]  He is retired and his daughter transports to [facility]  Pt and daughter report they wish to return home with [facility] through [facility]  Referral placed in [facility] and [facility] able to accept  Family deny other needs at this time  Discussed with RN and SLIM  CM reviewed discharge planning process including the following: identifying caregivers at home, preference for d/c planning needs, availability of treatment team to discuss questions or concerns patient and/or family may have regarding diagnosis, plan of care, old or new medications and discharge planning  Discharge Checklist reviewed and CM will continue to monitor for progress toward discharge goals in nursing and provider rounds

## 2018-12-06 NOTE — PLAN OF CARE
Problem: DISCHARGE PLANNING - CARE MANAGEMENT  Goal: Discharge to post-acute care or home with appropriate resources  INTERVENTIONS:  - Conduct assessment to determine patient/family and health care team treatment goals, and need for post-acute services based on payer coverage, community resources, and patient preferences, and barriers to discharge  - Address psychosocial, clinical, and financial barriers to discharge as identified in assessment in conjunction with the patient/family and health care team  - Arrange appropriate level of post-acute services according to patients   needs and preference and payer coverage in collaboration with the physician and health care team  - Communicate with and update the patient/family, physician, and health care team regarding progress on the discharge plan  - Arrange appropriate transportation to post-acute venues  Outcome: Progressing  LOS 1  LACE 70  Patient is not a 30 day readmission  CM met with pt and family at bedside to discuss DCP  Pt lives with wife in [place] in a 2 story home with 4 KLAUS and plan for a ramp to be installed  He has a walker, rollator, lift chair and wheelchair at home  He has 2 daughters who assist with ADLs during the day and he also has a HHA through [employer] [facility] 2 hours a day 3 times a week as well as VNA through [place]  He has a LW/AD, copy requested  He uses [facility] in [place]  He is retired and his daughter transports to [facility]  Pt and daughter report they wish to return home with ELIZABETH through [facility]  Referral placed in [place] and [facility] able to accept  Family deny other needs at this time  Discussed with RN and SLIM  CM reviewed discharge planning process including the following: identifying caregivers at home, preference for d/c planning needs, availability of treatment team to discuss questions or concerns patient and/or family may have regarding diagnosis, plan of care, old or new medications and discharge planning   Discharge Checklist reviewed and CM will continue to monitor for progress toward discharge goals in nursing and provider rounds

## 2018-12-06 NOTE — DISCHARGE SUMMARY
Discharge Summary - [facility] Internal Medicine    Patient Information: Ursula Spear  [de-identified] y o  male MRN: [id number]  Unit/Bed#: -01 Encounter: [id number]    Discharging Physician / Practitioner: Stephanie Diaz MD  PCP: Rukhsana Casillas MD  Admission Date: 12/4/2018  Discharge Date: 12/06/18    Disposition:     Home    Reason for Admission: Confusion    Discharge Diagnoses:     Principal Problem:    Acute metabolic encephalopathy  Active Problems:    Parkinson's disease with use of electrical brain stimulation ([facility] )    Drooling    Dysphagia    Pancreatic cyst    Generalized weakness    Type 2 diabetes mellitus ([facility] )    HTN (hypertension)    History of CVA (cerebrovascular accident)    Hypoglycemia  Resolved Problems:    * No resolved hospital problems  *      Consultations During Hospital Stay:  · neurology    Procedures Performed:     · none    Significant Findings / Test Results:     · hypoglycemia    Incidental Findings:   ·      Test Results Pending at Discharge (will require follow up):   ·      Outpatient Tests Requested:  ·     Complications:  None    History of Present Illness:     Ursula Spear  is a [de-identified] y o  male with a past medical history of Parkinson's disease diagnosed in 2005 s/p deep brain stimulator, drooling, dysphagia, prior CVA, HTN, HLD, and T2DM who presents with generalized weakness for the last 3 days associated with hypoglycemia this morning  Patient is a very poor historian due to baseline Parkinson's disease and dementia  Patient's daughter noted that the patient began with generalized weakness on Saturday, appearing off    Patient slept most of Sunday add continued to be weak on Monday into Tuesday morning  On Tuesday morning, daughter checked his blood sugar and noted it to be 80  She states this is low for him as he is never below 110  She decided to seek medical attention this morning due to hypoglycemia and persistent altered mental status    Of note, patient recently completed a 10 day course of ciprofloxacin on 11/17/2018  Urine culture on available to us at this time  Spoke with on-call neurologist, Dr Deonna Paige, who recommended plain CT head with serial exams  If there is no improvement in his mentation after infectious workup, consult Neurology tomorrow  Patient declines dizziness, lightheadedness, chest tightness, shortness of breath, abdominal pain, nausea, vomiting, diarrhea  Hospital Course:     Pablo Pablo  is a [de-identified] y o  male patient who originally presented to the hospital on 12/4/2018 due to acute toxic metabolic encephalopathy due to hypoglycemia  Patient was placed on d10  Neurology also evaluated the patient for possible stroke and it was ruled out  All his symptoms were thought to be caused by his hypoglycemia  D10 was stoppped and his lantus was decreased from 32units at night to 20units at night  His blood sugar have improved  He is currently hemodynamically stable and will be discharged home  Condition at Discharge: stable     Discharge Day Visit / Exam:     Subjective:  Patient seen and examined at bedside  Patient has no new complaints  Vitals: Blood Pressure: 119/56 (12/06/18 0700)  Pulse: 63 (12/06/18 0700)  Temperature: 97 5 °F (36 4 °C) (12/06/18 0700)  Temp Source: Oral (12/06/18 0700)  Respirations: 17 (12/06/18 0700)  Height: 5' 10" (177 8 cm) (12/04/18 0930)  Weight - Scale: 90 kg (198 lb 6 6 oz) (12/04/18 0949)  SpO2: 100 % (12/06/18 0700)  Exam:   Physical Exam   Constitutional: He appears well-developed and well-nourished  HENT:   Head: Normocephalic and atraumatic  Eyes: Pupils are equal, round, and reactive to light  Conjunctivae and EOM are normal    Neck: Normal range of motion  Neck supple  No JVD present  No tracheal deviation present  No thyromegaly present  Cardiovascular: Normal rate, regular rhythm and normal heart sounds  Exam reveals no gallop and no friction rub  No murmur heard    Pulmonary/Chest: Effort normal and breath sounds normal  No respiratory distress  He has no wheezes  He has no rales  Abdominal: Soft  Bowel sounds are normal  He exhibits no distension  There is no tenderness  There is no rebound  Musculoskeletal: Normal range of motion  He exhibits no edema  Neurological: He is alert  Skin: Skin is warm and dry  No rash noted  No erythema  No pallor  Vitals reviewed  Discussion with Family:     Discharge instructions/Information to patient and family:   See after visit summary for information provided to patient and family  Provisions for Follow-Up Care:  See after visit summary for information related to follow-up care and any pertinent home health orders  Planned Readmission: none     Discharge Statement:  I spent 50 minutes discharging the patient  This time was spent on the day of discharge  I had direct contact with the patient on the day of discharge  Greater than 50% of the total time was spent examining patient, answering all patient questions, arranging and discussing plan of care with patient as well as directly providing post-discharge instructions  Additional time then spent on discharge activities  Discharge Medications:  See after visit summary for reconciled discharge medications provided to patient and family        ** Please Note: This note has been constructed using a voice recognition system **

## 2018-12-06 NOTE — PHYSICAL THERAPY NOTE
PHYSICAL THERAPY NOTE          Patient Name: Gume Contreras  Today's Date: 12/6/2018 12/06/18 1145   Pain Assessment   Pain Assessment No/denies pain   Pain Score No Pain   Restrictions/Precautions   Weight Bearing Precautions Per Order No   Braces or Orthoses (none at baseline)   Other Precautions Chair Alarm; Bed Alarm; Fall Risk;Cognitive   General   Chart Reviewed Yes   Response to Previous Treatment Patient with no complaints from previous session  Family/Caregiver Present Yes  (spouse)   Cognition   Overall Cognitive Status Impaired   Arousal/Participation Alert; Responsive; Cooperative   Attention Attends with cues to redirect   Orientation Level Oriented to person;Oriented to place;Oriented to situation;Disoriented to time   Memory Decreased short term memory   Following Commands Follows all commands and directions without difficulty   Comments Pt agreeable to PT session    Bed Mobility   Additional Comments Pt received OOB in recliner chair upon arrival for therapy session   Transfers   Sit to Stand 4  Minimal assistance   Additional items Assist x 1; Increased time required;Verbal cues;Armrests   Stand to Sit 4  Minimal assistance   Additional items Assist x 1; Armrests; Increased time required;Verbal cues   Additional Comments Pt required verbal cues to increase his step length bilaterally in order to provide for increased foot clearance, to increase gait efficiency and stability and to decrease fall risk  Ambulation/Elevation   Gait pattern Short stride; Shuffling;Excessively slow; Step to;Decreased foot clearance;Narrow MERLIN   Gait Assistance 4  Minimal assist   Additional items Assist x 1;Verbal cues; Tactile cues   Assistive Device Rolling walker   Distance 85 feet    Stair Management Assistance Not tested   Balance   Static Sitting Good   Dynamic Sitting Fair +   Static Standing Fair   Dynamic Standing Fair - Ambulatory Poor +   Activity Tolerance   Activity Tolerance Patient limited by fatigue   Nurse Made Aware Yes, RN Alex Ashraf verbalized patient appropriate for PT session, made aware of therapy outcome/recs    Assessment   Prognosis Good   Problem List Decreased strength;Decreased endurance; Impaired balance;Decreased mobility; Decreased coordination;Decreased cognition;Decreased skin integrity   Assessment Pt seen for PT treatment session this date with interventions consisting of gait training w/ emphasis on improving pt's ability to ambulate level surfaces x 85 feet  with min A provided by therapist with RW  Pt required verbal cues as indicated to correct gait deficits in order to provide for increased foot clearance, increased gait efficiency and stability and decreased fall risk  Pt agreeable to PT treatment session upon arrival, pt found seated OOB in recliner, in no apparent distress  In comparison to previous session, pt with improvements in ambulation distance with RW   Post session: pt returned back to recliner, chair alarm engaged, all needs in reach and RN notified of session findings/recommendations Continue to recommend Home PT and home with family support 24/7 at time of d/c in order to maximize pt's functional independence and safety w/ mobility  Pt continues to be functioning below baseline level, and remains limited 2* factors listed above and including risk for falls  PT will continue to see pt while here in order to address the deficits listed above and provide interventions consistent w/ POC in effort to achieve STGs  Barriers to Discharge Inaccessible home environment   Goals   Patient Goals none expressed by patient    STG Expiration Date 12/15/18   Short Term Goal #1 STGs remain appropriate    Treatment Day 1   Plan   Treatment/Interventions Functional transfer training;LE strengthening/ROM; Elevations; Therapeutic exercise; Endurance training;Patient/family training;Equipment eval/education; Bed mobility;Gait training;Spoke to nursing;Family   Progress Slow progress, decreased activity tolerance   PT Frequency 5x/wk   Recommendation   Recommendation 24 hour supervision/assist;Home PT; Home with family support   Equipment Recommended (Pt has rollator at home )   Additional Comments Pt is scheduled to be discharged    Raymonde Dancer, PT

## 2019-01-15 ENCOUNTER — HOSPITAL ENCOUNTER (INPATIENT)
Facility: HOSPITAL | Age: 79
LOS: 4 days | Discharge: NURSING, REHAB OR LONG-TERM CARE | DRG: 057 | End: 2019-01-19
Attending: EMERGENCY MEDICINE | Admitting: INTERNAL MEDICINE
Payer: COMMERCIAL

## 2019-01-15 ENCOUNTER — TELEPHONE (OUTPATIENT)
Dept: NEUROLOGY | Facility: CLINIC | Age: 79
End: 2019-01-15

## 2019-01-15 ENCOUNTER — APPOINTMENT (EMERGENCY)
Dept: CT IMAGING | Facility: HOSPITAL | Age: 79
DRG: 057 | End: 2019-01-15
Payer: COMMERCIAL

## 2019-01-15 DIAGNOSIS — G20 PARKINSON'S DISEASE WITH USE OF ELECTRICAL BRAIN STIMULATION (HCC): ICD-10-CM

## 2019-01-15 DIAGNOSIS — R26.2 AMBULATORY DYSFUNCTION: Primary | ICD-10-CM

## 2019-01-15 DIAGNOSIS — K11.7 DROOLING: ICD-10-CM

## 2019-01-15 PROBLEM — S06.0X9A CONCUSSION: Status: ACTIVE | Noted: 2019-01-15

## 2019-01-15 LAB
ALBUMIN SERPL BCP-MCNC: 3.6 G/DL (ref 3.5–5)
ALP SERPL-CCNC: 101 U/L (ref 46–116)
ALT SERPL W P-5'-P-CCNC: 11 U/L (ref 12–78)
ANION GAP SERPL CALCULATED.3IONS-SCNC: 11 MMOL/L (ref 4–13)
AST SERPL W P-5'-P-CCNC: 19 U/L (ref 5–45)
ATRIAL RATE: 67 BPM
BACTERIA UR QL AUTO: ABNORMAL /HPF
BASOPHILS # BLD AUTO: 0.03 THOUSANDS/ΜL (ref 0–0.1)
BASOPHILS NFR BLD AUTO: 1 % (ref 0–1)
BILIRUB SERPL-MCNC: 1.4 MG/DL (ref 0.2–1)
BILIRUB UR QL STRIP: NEGATIVE
BUN SERPL-MCNC: 26 MG/DL (ref 5–25)
CALCIUM SERPL-MCNC: 9.4 MG/DL (ref 8.3–10.1)
CHLORIDE SERPL-SCNC: 101 MMOL/L (ref 100–108)
CLARITY UR: CLEAR
CO2 SERPL-SCNC: 27 MMOL/L (ref 21–32)
COLOR UR: YELLOW
CREAT SERPL-MCNC: 1.54 MG/DL (ref 0.6–1.3)
EOSINOPHIL # BLD AUTO: 0.1 THOUSAND/ΜL (ref 0–0.61)
EOSINOPHIL NFR BLD AUTO: 2 % (ref 0–6)
ERYTHROCYTE [DISTWIDTH] IN BLOOD BY AUTOMATED COUNT: 13.2 % (ref 11.6–15.1)
GFR SERPL CREATININE-BSD FRML MDRD: 42 ML/MIN/1.73SQ M
GLUCOSE SERPL-MCNC: 178 MG/DL (ref 65–140)
GLUCOSE UR STRIP-MCNC: NEGATIVE MG/DL
HCT VFR BLD AUTO: 39.1 % (ref 36.5–49.3)
HGB BLD-MCNC: 12.8 G/DL (ref 12–17)
HGB UR QL STRIP.AUTO: ABNORMAL
IMM GRANULOCYTES # BLD AUTO: 0.02 THOUSAND/UL (ref 0–0.2)
IMM GRANULOCYTES NFR BLD AUTO: 0 % (ref 0–2)
KETONES UR STRIP-MCNC: NEGATIVE MG/DL
LACTATE SERPL-SCNC: 1.7 MMOL/L (ref 0.5–2)
LEUKOCYTE ESTERASE UR QL STRIP: NEGATIVE
LYMPHOCYTES # BLD AUTO: 2.07 THOUSANDS/ΜL (ref 0.6–4.47)
LYMPHOCYTES NFR BLD AUTO: 36 % (ref 14–44)
MCH RBC QN AUTO: 31.4 PG (ref 26.8–34.3)
MCHC RBC AUTO-ENTMCNC: 32.7 G/DL (ref 31.4–37.4)
MCV RBC AUTO: 96 FL (ref 82–98)
MONOCYTES # BLD AUTO: 0.6 THOUSAND/ΜL (ref 0.17–1.22)
MONOCYTES NFR BLD AUTO: 11 % (ref 4–12)
NEUTROPHILS # BLD AUTO: 2.9 THOUSANDS/ΜL (ref 1.85–7.62)
NEUTS SEG NFR BLD AUTO: 50 % (ref 43–75)
NITRITE UR QL STRIP: NEGATIVE
NON-SQ EPI CELLS URNS QL MICRO: ABNORMAL /HPF
NRBC BLD AUTO-RTO: 0 /100 WBCS
P AXIS: 81 DEGREES
PH UR STRIP.AUTO: 5.5 [PH] (ref 4.5–8)
PLATELET # BLD AUTO: 213 THOUSANDS/UL (ref 149–390)
PMV BLD AUTO: 9.6 FL (ref 8.9–12.7)
POTASSIUM SERPL-SCNC: 3.9 MMOL/L (ref 3.5–5.3)
PR INTERVAL: 174 MS
PROT SERPL-MCNC: 7.6 G/DL (ref 6.4–8.2)
PROT UR STRIP-MCNC: NEGATIVE MG/DL
QRS AXIS: -35 DEGREES
QRSD INTERVAL: 130 MS
QT INTERVAL: 410 MS
QTC INTERVAL: 433 MS
RBC # BLD AUTO: 4.08 MILLION/UL (ref 3.88–5.62)
RBC #/AREA URNS AUTO: ABNORMAL /HPF
SODIUM SERPL-SCNC: 139 MMOL/L (ref 136–145)
SP GR UR STRIP.AUTO: 1.01 (ref 1–1.03)
T WAVE AXIS: -7 DEGREES
TROPONIN I SERPL-MCNC: <0.02 NG/ML
UROBILINOGEN UR QL STRIP.AUTO: 0.2 E.U./DL
VENTRICULAR RATE: 67 BPM
WBC # BLD AUTO: 5.72 THOUSAND/UL (ref 4.31–10.16)
WBC #/AREA URNS AUTO: ABNORMAL /HPF

## 2019-01-15 PROCEDURE — 81001 URINALYSIS AUTO W/SCOPE: CPT | Performed by: EMERGENCY MEDICINE

## 2019-01-15 PROCEDURE — 93005 ELECTROCARDIOGRAM TRACING: CPT

## 2019-01-15 PROCEDURE — 36415 COLL VENOUS BLD VENIPUNCTURE: CPT

## 2019-01-15 PROCEDURE — 99285 EMERGENCY DEPT VISIT HI MDM: CPT

## 2019-01-15 PROCEDURE — 93010 ELECTROCARDIOGRAM REPORT: CPT | Performed by: INTERNAL MEDICINE

## 2019-01-15 PROCEDURE — 85025 COMPLETE CBC W/AUTO DIFF WBC: CPT | Performed by: EMERGENCY MEDICINE

## 2019-01-15 PROCEDURE — 83605 ASSAY OF LACTIC ACID: CPT | Performed by: PHYSICIAN ASSISTANT

## 2019-01-15 PROCEDURE — 84484 ASSAY OF TROPONIN QUANT: CPT | Performed by: PHYSICIAN ASSISTANT

## 2019-01-15 PROCEDURE — 70450 CT HEAD/BRAIN W/O DYE: CPT

## 2019-01-15 PROCEDURE — 80053 COMPREHEN METABOLIC PANEL: CPT | Performed by: EMERGENCY MEDICINE

## 2019-01-15 RX ORDER — ROPINIROLE 2 MG/1
2 TABLET, FILM COATED, EXTENDED RELEASE ORAL
Status: DISCONTINUED | OUTPATIENT
Start: 2019-01-16 | End: 2019-01-16

## 2019-01-15 RX ORDER — ATORVASTATIN CALCIUM 40 MG/1
80 TABLET, FILM COATED ORAL EVERY EVENING
Status: DISCONTINUED | OUTPATIENT
Start: 2019-01-16 | End: 2019-01-19 | Stop reason: HOSPADM

## 2019-01-15 RX ORDER — INSULIN GLARGINE 100 [IU]/ML
20 INJECTION, SOLUTION SUBCUTANEOUS
Status: DISCONTINUED | OUTPATIENT
Start: 2019-01-16 | End: 2019-01-19 | Stop reason: HOSPADM

## 2019-01-15 RX ORDER — CARBIDOPA AND LEVODOPA 50; 200 MG/1; MG/1
1 TABLET, EXTENDED RELEASE ORAL
Status: DISCONTINUED | OUTPATIENT
Start: 2019-01-16 | End: 2019-01-19 | Stop reason: HOSPADM

## 2019-01-15 RX ORDER — SODIUM CHLORIDE 9 MG/ML
100 INJECTION, SOLUTION INTRAVENOUS CONTINUOUS
Status: DISPENSED | OUTPATIENT
Start: 2019-01-16 | End: 2019-01-16

## 2019-01-15 RX ORDER — ASPIRIN 81 MG/1
81 TABLET, CHEWABLE ORAL EVERY EVENING
Status: DISCONTINUED | OUTPATIENT
Start: 2019-01-16 | End: 2019-01-19 | Stop reason: HOSPADM

## 2019-01-15 RX ORDER — HYDROCHLOROTHIAZIDE 25 MG/1
25 TABLET ORAL DAILY
Status: DISCONTINUED | OUTPATIENT
Start: 2019-01-16 | End: 2019-01-16

## 2019-01-15 RX ORDER — LISINOPRIL 2.5 MG/1
1.25 TABLET ORAL DAILY
Status: DISCONTINUED | OUTPATIENT
Start: 2019-01-16 | End: 2019-01-16

## 2019-01-15 RX ADMIN — SODIUM CHLORIDE 1000 ML: 0.9 INJECTION, SOLUTION INTRAVENOUS at 22:18

## 2019-01-15 NOTE — TELEPHONE ENCOUNTER
Increased generalized weakness x2days, needs more assist getting up from a chair   No recent falls, no head injury, no confusion, no fevers  No freezing/shuffling, walking good  More tongue drooping  B/legs shaking while sitting  Blood sugars have been normal    Jyothi Cochran, home care nurse calls to report, asking if there will be any orders for labs, urine, etc  Isabela Franco w/pcp re same    Please advise    Please call vivek carranza @[phone]

## 2019-01-15 NOTE — TELEPHONE ENCOUNTER
vivek, pt's radhaer in law called  she is requesting to speak to [facility]  pt is shaking more and having trouble eating  she states that dr Kristina Blake stated that pt was at his max setting for DBS  r hand and l leg tremors  when awake, tremors are pretty consistant  she states that he sleeps alot during the day     carb/levo [id number] 1 tab qhs   carb/levo 25/100mg 1 tab tid  ropinirole xl 2mg 1 tab qhs  [id number] left  410-right  when looking at the machine     [phone]

## 2019-01-15 NOTE — TELEPHONE ENCOUNTER
Spoke with Faith Nowak this afternoon  Patient is having more left leg and right arm tremors  He is fine if he is sleeping however when he is up and sitting in the chair the tremors come out more  The right arm tremor is interfering with him eating  At the last visit increasing the amplitude did not improve the tremors per the note however will try and make an adjustment to see if there is any change  Had Faith Nowak increase the left side from 4 1 to 4 2 for the hand tremor  If no improvement but no side effects after 1-2 days then she can further increase to 4 2  On the right increased from 4 4 to 4 5 for the left leg tremor  She will call with an update in a few days

## 2019-01-16 LAB
ANION GAP SERPL CALCULATED.3IONS-SCNC: 7 MMOL/L (ref 4–13)
BUN SERPL-MCNC: 20 MG/DL (ref 5–25)
CALCIUM SERPL-MCNC: 8.9 MG/DL (ref 8.3–10.1)
CHLORIDE SERPL-SCNC: 107 MMOL/L (ref 100–108)
CO2 SERPL-SCNC: 27 MMOL/L (ref 21–32)
CREAT SERPL-MCNC: 1.09 MG/DL (ref 0.6–1.3)
GFR SERPL CREATININE-BSD FRML MDRD: 64 ML/MIN/1.73SQ M
GLUCOSE SERPL-MCNC: 112 MG/DL (ref 65–140)
GLUCOSE SERPL-MCNC: 123 MG/DL (ref 65–140)
GLUCOSE SERPL-MCNC: 124 MG/DL (ref 65–140)
GLUCOSE SERPL-MCNC: 157 MG/DL (ref 65–140)
GLUCOSE SERPL-MCNC: 206 MG/DL (ref 65–140)
GLUCOSE SERPL-MCNC: 209 MG/DL (ref 65–140)
POTASSIUM SERPL-SCNC: 3.7 MMOL/L (ref 3.5–5.3)
SODIUM SERPL-SCNC: 141 MMOL/L (ref 136–145)

## 2019-01-16 PROCEDURE — 92610 EVALUATE SWALLOWING FUNCTION: CPT

## 2019-01-16 PROCEDURE — 92526 ORAL FUNCTION THERAPY: CPT

## 2019-01-16 PROCEDURE — G8979 MOBILITY GOAL STATUS: HCPCS

## 2019-01-16 PROCEDURE — 82948 REAGENT STRIP/BLOOD GLUCOSE: CPT

## 2019-01-16 PROCEDURE — G8997 SWALLOW GOAL STATUS: HCPCS

## 2019-01-16 PROCEDURE — 97163 PT EVAL HIGH COMPLEX 45 MIN: CPT

## 2019-01-16 PROCEDURE — G8978 MOBILITY CURRENT STATUS: HCPCS

## 2019-01-16 PROCEDURE — G8996 SWALLOW CURRENT STATUS: HCPCS

## 2019-01-16 PROCEDURE — 99222 1ST HOSP IP/OBS MODERATE 55: CPT | Performed by: INTERNAL MEDICINE

## 2019-01-16 PROCEDURE — 80048 BASIC METABOLIC PNL TOTAL CA: CPT | Performed by: INTERNAL MEDICINE

## 2019-01-16 PROCEDURE — 99222 1ST HOSP IP/OBS MODERATE 55: CPT | Performed by: PSYCHIATRY & NEUROLOGY

## 2019-01-16 RX ORDER — ROPINIROLE 0.25 MG/1
0.75 TABLET, FILM COATED ORAL 3 TIMES DAILY
Status: DISCONTINUED | OUTPATIENT
Start: 2019-01-16 | End: 2019-01-19 | Stop reason: HOSPADM

## 2019-01-16 RX ADMIN — INSULIN LISPRO 2 UNITS: 100 INJECTION, SOLUTION INTRAVENOUS; SUBCUTANEOUS at 15:15

## 2019-01-16 RX ADMIN — ROPINIROLE 0.75 MG: 0.25 TABLET, FILM COATED ORAL at 08:37

## 2019-01-16 RX ADMIN — INSULIN GLARGINE 20 UNITS: 100 INJECTION, SOLUTION SUBCUTANEOUS at 21:01

## 2019-01-16 RX ADMIN — INSULIN LISPRO 1 UNITS: 100 INJECTION, SOLUTION INTRAVENOUS; SUBCUTANEOUS at 00:43

## 2019-01-16 RX ADMIN — CARBIDOPA AND LEVODOPA 1 TABLET: 25; 100 TABLET ORAL at 18:42

## 2019-01-16 RX ADMIN — CARBIDOPA AND LEVODOPA 1 TABLET: 25; 100 TABLET ORAL at 15:16

## 2019-01-16 RX ADMIN — METOPROLOL TARTRATE 12.5 MG: 25 TABLET ORAL at 08:36

## 2019-01-16 RX ADMIN — ASPIRIN 81 MG 81 MG: 81 TABLET ORAL at 00:32

## 2019-01-16 RX ADMIN — ATORVASTATIN CALCIUM 80 MG: 40 TABLET, FILM COATED ORAL at 18:40

## 2019-01-16 RX ADMIN — ASPIRIN 81 MG 81 MG: 81 TABLET ORAL at 18:42

## 2019-01-16 RX ADMIN — ATORVASTATIN CALCIUM 80 MG: 40 TABLET, FILM COATED ORAL at 00:31

## 2019-01-16 RX ADMIN — ROPINIROLE 0.75 MG: 0.25 TABLET, FILM COATED ORAL at 15:16

## 2019-01-16 RX ADMIN — CARBIDOPA AND LEVODOPA 1 TABLET: 25; 100 TABLET ORAL at 08:36

## 2019-01-16 RX ADMIN — ENOXAPARIN SODIUM 40 MG: 40 INJECTION SUBCUTANEOUS at 08:37

## 2019-01-16 RX ADMIN — INSULIN GLARGINE 20 UNITS: 100 INJECTION, SOLUTION SUBCUTANEOUS at 00:28

## 2019-01-16 RX ADMIN — CARBIDOPA AND LEVODOPA 1 TABLET: 50; 200 TABLET, EXTENDED RELEASE ORAL at 21:01

## 2019-01-16 RX ADMIN — ROPINIROLE 0.75 MG: 0.25 TABLET, FILM COATED ORAL at 21:01

## 2019-01-16 RX ADMIN — SODIUM CHLORIDE 100 ML/HR: 0.9 INJECTION, SOLUTION INTRAVENOUS at 00:20

## 2019-01-16 RX ADMIN — METOPROLOL TARTRATE 12.5 MG: 25 TABLET ORAL at 18:40

## 2019-01-16 RX ADMIN — CARBIDOPA AND LEVODOPA 1 TABLET: 50; 200 TABLET, EXTENDED RELEASE ORAL at 00:43

## 2019-01-16 RX ADMIN — ROPINIROLE 0.75 MG: 0.25 TABLET, FILM COATED ORAL at 01:42

## 2019-01-16 NOTE — DISCHARGE INSTRUCTIONS
Concussion   WHAT YOU NEED TO KNOW:   A concussion is a mild brain injury  It is usually caused by a bump or blow to the head from a fall, a motor vehicle crash, or a sports injury  Sometimes being shaken forcefully may cause a concussion  DISCHARGE INSTRUCTIONS:   Have someone else call 911 for the following:   · Someone tries to wake you and cannot do so  · You have a seizure, increasing confusion, or a change in personality  · Your speech becomes slurred, or you have new vision problems  Return to the emergency department if:   · You have a severe headache that does not go away  · You have arm or leg weakness, numbness, or new problems with coordination  · You have blood or clear fluid coming out of the ears or nose  Contact your healthcare provider if:   · You have nausea or are vomiting  · You feel more sleepy than usual     · Your symptoms get worse  · Your symptoms last longer than 6 weeks after the injury  · You have questions or concerns about your condition or care  Medicines:   · Acetaminophen  helps to decrease pain  It is available without a doctor's order  Ask how much to take and how often to take it  Follow directions  Acetaminophen can cause liver damage if not taken correctly  · NSAIDs , such as ibuprofen, help decrease swelling and pain  NSAIDs can cause stomach bleeding or kidney problems in certain people  If you take blood thinner medicine, always ask your healthcare provider if NSAIDs are safe for you  Always read the medicine label and follow directions  · Take your medicine as directed  Contact your healthcare provider if you think your medicine is not helping or if you have side effects  Tell him or her if you are allergic to any medicine  Keep a list of the medicines, vitamins, and herbs you take  Include the amounts, and when and why you take them  Bring the list or the pill bottles to follow-up visits   Carry your medicine list with you in case of an emergency  Follow up with your healthcare provider as directed:  Write down your questions so you remember to ask them during your visits  Self-care:   · Rest  from physical and mental activities as directed  Mental activities are those that require thinking, concentration, and attention  You will need to rest until your symptoms are gone  Rest will allow you to recover from your concussion  Ask your healthcare provider when you can return to work and other daily activities  · Have someone stay with you for the first 24 hours after your injury  Your healthcare provider should be contacted if your symptoms get worse, or you develop new symptoms  · Do not participate in sports and physical activities until your healthcare provider says it is okay  They could make your symptoms worse or lead to another concussion  Your healthcare provider will tell you when it is okay for you to return to sports or physical activities  Prevent another concussion:   · Wear protective sports equipment that fit properly  Helmets help decrease your risk of a serious brain injury  Talk to your healthcare provider about ways you can decrease your risk for a concussion if you play sports  · Wear your seat belt  every time you travel  This helps to decrease your risk of a head injury if you are in a car accident  © 2017 [facility] Information is for End User's use only and may not be sold, redistributed or otherwise used for commercial purposes  All illustrations and images included in CareNotes® are the copyrighted property of [employer] , "[employer]  or [employer]ev[employer] Laguna  The above information is an  only  It is not intended as medical advice for individual conditions or treatments  Talk to your doctor, nurse or pharmacist before following any medical regimen to see if it is safe and effective for you

## 2019-01-16 NOTE — H&P
H&P- Nico Monroe Aidee Perez  1940, [de-identified] y o  male MRN: [id number]    Unit/Bed#: -01 Encounter: [id number]    Primary Care Provider: Billy Parker MD   Date and time admitted to hospital: 1/15/2019  6:35 PM        * Ambulatory dysfunction   Assessment & Plan    Patient reported to have become progressively weak since his last hospitalization in December  He lives at home and has PT/OT who comes 2 times per week  His daughter in law states that over the past 2 days, he was unable to get out of his lift chair which is new for him  She notes that since his discharge in December, he has been sleeping more than usual   She is interested in having him evaluated by PT/OT for subacute rehab   - PT/OT ordered    -patient has Parkinson's disease and has a deep brain stimulator  In addition to weakness, it was noted that his tongue was having worsening protrusion concerning for possible CVA presentation  He has a history of CVA in the past and also does have baseline drooping and some tongue protrusion  CT of the head was negative for any acute abnormality  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  Will consult Neurology for any additional workup recommendations  Continue home Sinemet, aspirin 81 mg daily, Lipitor 80 mg daily  Fall precautions  - basic labs showing AIDA of 1 5 which is likely prerenal   Daughter-in-law states that patient does not take enough liquids at home  Dehydration could be a contributing factor to patient's weakness  Plan to hydrate patient with normal saline overnight at 100cc/hr  HTN (hypertension)   Assessment & Plan    Cont home BB  Holding home Acei and HCTZ given concern for AIDA likely from prerenal/dehydration cause  Type 2 diabetes mellitus ([facility] [address] )   Assessment & Plan    Lab Results   Component Value Date    HGBA1C 6 9 (H) 09/19/2015       No results for input(s): POCGLU in the last 72 hours  Blood Sugar Average: Last 72 hrs:   Holding home metformin given AIDA   Cont home lantus 20U qhs  SSI ordered q 6hrs  NPO pending swallow eval            VTE PROPHYLAXIS:  + SCDs, lovenox    CODE STATUS: FULL    Anticipated Length of Stay:  Patient will be admitted on an Inpatient basis with an anticipated length of stay of   2 midnights  Justification for Hospital Stay:  Ambulatory dysfunction, acute kidney injury      CHIEF COMPLAINT   weakness, tongue protrusion  HISTORY OF PRESENT ILLNESS  Kat Mi  is an 49-year-old male with past medical history notable for prior CVA, Parkinson's disease with deep brain stimulator  Also has a pacemaker placement history  He lives at home and has home PT/OT that comes 2 times per week  Today, he was brought in by his daughter in law for having worsening weakness over the last 2 days  She noticed also that his tongue was protruded more than usual and she was concerned that he might have a stroke  At baseline, she reports that he has some tongue protrusion and drooling due to his Parkinson's  He was last hospitalized in December for weakness which was thought to be from hypoglycemia  At that time, his insulin regimen was decreased from 35 units of long-acting insulin to 20 units daily  His daughter-in-law states that since his return, he has been weak progressively getting weaker in [address] [facility] [address]  Over the last 2 days, he has become so weak that he is unable to lift himself out of her lift chair which could normally do  No falls reported  Patient himself denies any fevers, chills, no coughs  He is reported to have poor p o  Intake including liquids  No nausea, vomiting, abdominal pain, diarrhea reported  He is reported to be sleeping more than usual since his last discharge in December  In the ED, given report of worsening facial asymmetry and tongue protrusion, he had a CT scan of the head which was negative for any acute abnormalities  His labs were notable for a creatinine of 1 5 with baseline around 1 0    Daughter-in-law was requesting inpatient admission for evaluation of PT/OT for consideration of rehab placement given ambulatory dysfunction  REVIEW OF SYSTEMS  A comprehensive 10 point review system conducted all negative except as mentioned in HPI       PMH/PSH    Past Medical History:   Diagnosis Date    Arthritis     BPH (benign prostatic hyperplasia)     Diabetes ([facility] [address] )     Heart problem     Movement disorder     Parkinson's disease ([facility] [address] )     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     Stroke [facility])        Past Surgical History:   Procedure Laterality Date    APPENDECTOMY      CARDIAC DEFIBRILLATOR PLACEMENT      CARDIAC DEFIBRILLATOR PLACEMENT      CHOLECYSTECTOMY      CORONARY ANGIOPLASTY WITH STENT PLACEMENT      DEEP BRAIN STIMULATOR PLACEMENT      EAR SURGERY      GALLBLADDER SURGERY      KNEE SURGERY      SKIN CANCER EXCISION         ALLERGIES  Allergies   Allergen Reactions    Clopidogrel Rash     PLAVIX       HOME MEDICATIONS  No current facility-administered medications on file prior to encounter        Current Outpatient Prescriptions on File Prior to Encounter   Medication Sig    aspirin 81 MG tablet Take 81 mg by mouth every evening      atorvastatin (LIPITOR) 80 mg tablet TAKE 1 TABLET BY MOUTH EVERY EVENING    carbidopa-levodopa (SINEMET CR)  mg per tablet Take 1 tablet by mouth daily at bedtime    carbidopa-levodopa (SINEMET)  mg per tablet Take 1 tablet by mouth 3 (three) times a day (Patient taking differently: Take 1 tablet by mouth 3 (three) times a day 0600; 1000; 1400 )    Cyanocobalamin (VITAMIN B 12 PO) Take 250 mg by mouth      hydrochlorothiazide (HYDRODIURIL) 25 mg tablet Take 25 mg by mouth daily Tuesdays and fridays     lisinopril (ZESTRIL) 2 5 mg tablet Take 0 5 tablets by mouth daily    metFORMIN (GLUCOPHAGE-XR) 500 mg 24 hr tablet Take 1,000 mg by mouth 2 (two) times a day    metoprolol tartrate (LOPRESSOR) 25 mg tablet Take 12 5 mg by mouth 2 (two) times a day    rOPINIRole (REQUIP XL) 2 MG 24 hr tablet Take 1 tablet (2 mg total) by mouth daily at bedtime    LANTUS SOLOSTAR 100 units/mL injection pen Inject 20 Units under the skin daily at bedtime for 30 days         SOCIAL HISTORY     Marital Status: /Civil Union   Substance Use History:   History   Alcohol Use No     History   Smoking Status    Former Smoker   Smokeless Tobacco    Never Used     History   Drug Use No       FAMILY HISTORY  Noncontributory to current presentation    OBJECTIVE    Vitals:   Blood Pressure: 139/71 (01/15/19 2241)  Pulse: 66 (01/15/19 2241)  Temperature: 97 8 °F (36 6 °C) (01/15/19 2241)  Temp Source: Oral (01/15/19 2241)  Respirations: 18 (01/15/19 2241)  Height: 5' 11" (180 3 cm) (01/15/19 2241)  Weight - Scale: 85 8 kg (189 lb 2 5 oz) (01/15/19 2241)  SpO2: 96 % (01/15/19 2241)    GENERAL: AAO x 3  NAD  HEENT: atraumatic, normocephalic  PERLAA  EOMI  Tongue is protruded and drooling is noted towards his left side  NECK- supple, no JVD, no lymphadenopathy, no thryomegaly  CHEST:  Clear to auscultation bilaterally  CVS: S1, S2   Regular rate and rhythm, no murmurs, rub or gallops  ABDOMEN:  Soft, nontender, nondistended, normoactive bowel sounds  NEUROLOGICAL: Strength 5/5 in UE and LE  Cogwheel rigidity and tremor of UE noted  Tongue protruded, speech is slurred but chronic  +L facial droop noted  EXTREMITIES: No cyanosis/clubbing or edema  LAB DATA    Results Reviewed     Procedure Component Value Units Date/Time    Lactic acid, plasma [[id number]]  (Normal) Collected:  01/15/19 2047    Lab Status:  Final result Specimen:  Blood from Arm, Right Updated:  01/15/19 2113     LACTIC ACID 1 7 mmol/L     Narrative:         Result may be elevated if tourniquet was used during collection      Troponin I [[id number]]  (Normal) Collected:  01/15/19 2030    Lab Status:  Final result Specimen:  Blood from Arm, Right Updated:  01/15/19 2059     Troponin I <0 02 ng/mL     Urine Microscopic [[id number]]  (Abnormal) Collected:  01/15/19 1954    Lab Status:  Final result Specimen:  Urine from Urine, Clean Catch Updated:  01/15/19 2008     RBC, UA 1-2 (A) /hpf      WBC, UA 0-1 (A) /hpf      Epithelial Cells Occasional /hpf      Bacteria, UA Occasional /hpf     UA w Reflex to Microscopic w Reflex to Culture [[id number]]  (Abnormal) Collected:  01/15/19 1954    Lab Status:  Final result Specimen:  Urine from Urine, Clean Catch Updated:  01/15/19 2000     Color, UA Yellow     Clarity, UA Clear     Specific Gravity, UA 1 015     pH, UA 5 5     Leukocytes, UA Negative     Nitrite, UA Negative     Protein, UA Negative mg/dl      Glucose, UA Negative mg/dl      Ketones, UA Negative mg/dl      Urobilinogen, UA 0 2 E U /dl      Bilirubin, UA Negative     Blood, UA Moderate (A)    Comprehensive metabolic panel [[id number]]  (Abnormal) Collected:  01/15/19 1858    Lab Status:  Final result Specimen:  Blood from Arm, Right Updated:  01/15/19 1925     Sodium 139 mmol/L      Potassium 3 9 mmol/L      Chloride 101 mmol/L      CO2 27 mmol/L      ANION GAP 11 mmol/L      BUN 26 (H) mg/dL      Creatinine 1 54 (H) mg/dL      Glucose 178 (H) mg/dL      Calcium 9 4 mg/dL      AST 19 U/L      ALT 11 (L) U/L      Alkaline Phosphatase 101 U/L      Total Protein 7 6 g/dL      Albumin 3 6 g/dL      Total Bilirubin 1 40 (H) mg/dL      eGFR 42 ml/min/1 73sq m     Narrative:         National Kidney Disease Education Program recommendations are as follows:  GFR calculation is accurate only with a steady state creatinine  Chronic Kidney disease less than 60 ml/min/1 73 sq  meters  Kidney failure less than 15 ml/min/1 73 sq  meters      CBC and differential [[id number]] Collected:  01/15/19 1858    Lab Status:  Final result Specimen:  Blood from Arm, Right Updated:  01/15/19 1903     WBC 5 72 Thousand/uL      RBC 4 08 Million/uL      Hemoglobin 12 8 g/dL      Hematocrit 39 1 %      MCV 96 fL      MCH 31 4 pg MCHC 32 7 g/dL      RDW 13 2 %      MPV 9 6 fL      Platelets 619 Thousands/uL      nRBC 0 /100 WBCs      Neutrophils Relative 50 %      Immat GRANS % 0 %      Lymphocytes Relative 36 %      Monocytes Relative 11 %      Eosinophils Relative 2 %      Basophils Relative 1 %      Neutrophils Absolute 2 90 Thousands/µL      Immature Grans Absolute 0 02 Thousand/uL      Lymphocytes Absolute 2 07 Thousands/µL      Monocytes Absolute 0 60 Thousand/µL      Eosinophils Absolute 0 10 Thousand/µL      Basophils Absolute 0 03 Thousands/µL           EKG, Pathology, and Other Studies Reviewed on Admission  Total time spent in the process of admission, completion of records, counseling, coordination of care, discussion with patient/family was approximately 35 minutes  Chadwick Palacios MD  HOSPITALIST SERVICES  1/16/2019      PLEASE NOTE:  This encounter was completed utilizing the [employer] Direct Speech Voice Recognition Software  Grammatical errors, random word insertions, pronoun errors and incomplete sentences are occasional consequences of the system due to software limitations, ambient noise and hardware issues  These may be missed by proof reading prior to affixing electronic signature  Any questions or concerns about the content, text or information contained within the body of this dictation should be directly addressed to the physician for clarification  Please do not hesitate to call me directly if you have any any questions or concerns

## 2019-01-16 NOTE — PLAN OF CARE
Problem: PHYSICAL THERAPY ADULT  Goal: Performs mobility at highest level of function for planned discharge setting  See evaluation for individualized goals  Treatment/Interventions: Functional transfer training, LE strengthening/ROM, Therapeutic exercise, Endurance training, Patient/family training, Equipment eval/education, Bed mobility, Gait training, Spoke to MD, Spoke to nursing  Equipment Recommended: Ino Sanchez (continue with RW)       See flowsheet documentation for full assessment, interventions and recommendations  Prognosis: Good  Problem List: Decreased strength, Decreased endurance, Impaired balance, Decreased mobility, Decreased coordination  Assessment: Pt is [de-identified] y o  male seen for PT evaluation on 1/16/2019 [facility] w/ Ambulatory dysfunction  Pt presents with generalized weakness, increased fatigue, difficulty ambulating, increased tremors, and drooling  PT consulted to assess pt's functional mobility and d/c needs  Order placed for PT eval and tx, w/ activity as tolerated order  Performed at least 2 patient identifiers during session: Name and wristband  Comorbidities affecting pt's physical performance at time of assessment include: arthritis, BPH, diabetes, heart problem, movement disorder, Parkinson's disease, presence of internal cardiac defibrillator and pacemaker, stroke, deep brain stimulator  PTA, pt was independent w/ all functional mobility w/ rollator, ambulates household distances and lives w/ wife in 2 level home (1st floor set up with ramped entrance)  Personal factors affecting pt at time of IE include: inaccessible home environment, ambulating w/ assistive device, inability to ambulate household distances, inability to navigate level surfaces w/o external assistance, limited home support, positive fall history and decreased initiation and engagement   Please find objective findings from PT assessment regarding body systems outlined above with impairments and limitations including weakness, impaired balance, decreased endurance, impaired coordination, gait deviations, decreased activity tolerance and fall risk, as well as mobility assessment (need for min-mod A x2 assist w/ all phases of mobility when usually ambulating independently and need for cueing for mobility technique)  The following objective measures performed on IE also reveal limitations: Barthel Index: 30/100 and Modified Whit: 4 (moderate/severe disability)  Pt's clinical presentation is currently unstable/unpredictable seen in pt's presentation of need for input for task focus and mobility technique and ongoing medical assessment  Pt to benefit from continued PT tx to address deficits as defined above and maximize level of functional independent mobility and consistency  From PT/mobility standpoint, recommendation at time of d/c would be STR pending progress in order to facilitate return to PLOF  Barriers to Discharge: Inaccessible home environment, Decreased caregiver support     Recommendation: Short-term skilled PT, OT consult, Rehab consult     PT - OK to Discharge: Yes (when medically cleared if to STR)    See flowsheet documentation for full assessment

## 2019-01-16 NOTE — NUTRITION
01/16/19 1220   Assessment   Timepoint Initial  (speech)   Labs   List Completed Labs (A1c 6 9- 2015; , 124; meds- lipitor, sinemet, IVF @ 100, lantus, humalog)   Feeding Route   PO Independent   Adequacy of Intake   Nutrition Modality PO  (mech soft, NTL)   Estimated calorie intake compared to estimated need no intakes at this time as pt was NPO this AM   Nutrition Prognosis   Comorbid Concerns (ambulatory dysfunction, HTN, DM, hx CVA, parkinsons with DBS)   Nutrition Considerations (no education needs warranted at this time)   PES Statement   Problem Intake   Oral or Nutritional Support Intake (2) Inadequate oral intake NI-2 1   Related to Fatigue/Lethargy   As evidenced by: Intake < estimated needs   Patient Nutrition Goals   Goal meet PO needs;increase intake   Goal Status initiated   Timeframe to complete goal by next f/u   Recommendations/Interventions   Summary Pt was NPO this AM, now advanced to mechanical soft with NTL per speech recommendations  Will add Honeyshake BID to increase calories and protein  Malnutrition/BMI Present No   Interventions Diet: continued as ordered; Supplement initiate   Nutrition Recommendations Continue diet as ordered   Nutrition Complexity Risk   Nutrition complexity level Moderate risk   Follow up date 01/23/19

## 2019-01-16 NOTE — UTILIZATION REVIEW
[address]  Utilization Review Department  Phone: [phone]; Fax [phone]  [email or web address]  ATTENTION: Please call with any questions or concerns to [phone]  and carefully listen to the prompts so that you are directed to the right person  Send all requests for admission clinical reviews, approved or denied determinations and any other requests to fax [phone]  All voicemails are confidential   Initial Clinical Review    Admission: Date/Time/Statement: INPT 1/15/19 @ 2155   Orders Placed This Encounter   Procedures    Inpatient Admission (expected length of stay for this patient is greater than two midnights)     Standing Status:   Standing     Number of Occurrences:   1     Order Specific Question:   Admitting Physician     Answer:   Gisselle Cruz [[id number]]     Order Specific Question:   Level of Care     Answer:   Med Surg [16]     Order Specific Question:   Estimated length of stay     Answer:   More than 2 Midnights     Order Specific Question:   Certification     Answer:   I certify that inpatient services are medically necessary for this patient for a duration of greater than two midnights  See H&P and MD Progress Notes for additional information about the patient's course of treatment  ED: Date/Time/Mode of Arrival:   ED Arrival Information     Expected Arrival Acuity Means of Arrival Escorted By Service Admission Type    - 1/15/2019 18:30 Emergent Wheelchair Family Member General Medicine Emergency    Arrival Complaint    STROKE SYMPTOMS         Chief Complaint:   Chief Complaint   Patient presents with    Weakness - Generalized     increased fatigue, weakness, difficulty ambulating, increasing tremors and drooling  History of Illness: 78-year-old male with past medical history notable for prior CVA, Parkinson's disease with deep brain stimulator  Also has a pacemaker placement history    He lives at home and has home PT/OT that comes 2 times per week  Today, he was brought in by his daughter in law for having worsening weakness over the last 2 days  She noticed also that his tongue was protruded more than usual and she was concerned that he might have a stroke  At baseline, she reports that he has some tongue protrusion and drooling due to his Parkinson's  He was last hospitalized in December for weakness which was thought to be from hypoglycemia  At that time, his insulin regimen was decreased from 35 units of long-acting insulin to 20 units daily  His daughter-in-law states that since his return, he has been weak progressively getting weaker in [address] [facility] [address]  Over the last 2 days, he has become so weak that he is unable to lift himself out of her lift chair which could normally do  No falls reported  Patient himself denies any fevers, chills, no coughs  He is reported to have poor p o  Intake including liquids  No nausea, vomiting, abdominal pain, diarrhea reported  He is reported to be sleeping more than usual since his last discharge in December      In the ED, given report of worsening facial asymmetry and tongue protrusion, he had a CT scan of the head which was negative for any acute abnormalities  His labs were notable for a creatinine of 1 5 with baseline around 1 0  Daughter-in-law was requesting inpatient admission for evaluation of PT/OT for consideration of rehab placement given ambulatory dysfunction      ED Vital Signs:   ED Triage Vitals   Temperature Pulse Respirations Blood Pressure SpO2   01/15/19 1843 01/15/19 1843 01/15/19 1843 01/15/19 1843 01/15/19 1843   97 8 °F (36 6 °C) 67 16 141/68 95 %      Temp Source Heart Rate Source Patient Position - Orthostatic VS BP Location FiO2 (%)   01/15/19 2241 01/15/19 2000 01/15/19 2000 01/15/19 2000 --   Oral Monitor Lying Left arm       Pain Score       01/15/19 2000       No Pain        Wt Readings from Last 1 Encounters:   01/15/19 85 8 kg (189 lb 2 5 oz) Vital Signs (abnormal):   01/16/19 0700  98 1 °F (36 7 °C)  74  18  138/74  96 %  None (Room air)  Lying   01/15/19 2241  97 8 °F (36 6 °C)  66  18  139/71  96 %  None (Room air)  Lying   01/15/19 2115  --  65  12  --  97 %  --  --   01/15/19 2000  --  68  15  118/58  99 %  None (Room air)  Lying   01/15/19 1843  97 8 °F (36 6 °C)  67  16  141/68  95 %  --  --       Pertinent Labs/Diagnostic Test Results:   Bun 26  Creat 1 54  Glu 178  Alt 11  Total bili 1 40    Urine Microscopic [[id number]]  (Abnormal) Collected:  01/15/19 1954      Lab Status:  Final result Specimen:  Urine from Urine, Clean Catch Updated:  01/15/19 2008       RBC, UA 1-2 (A) /hpf         WBC, UA 0-1 (A) /hpf         Epithelial Cells Occasional /hpf         Bacteria, UA Occasional /hpf       UA w Reflex to Microscopic w Reflex to Culture [[id number]]  (Abnormal) Collected:  01/15/19 1954     Lab Status:  Final result Specimen:  Urine from Urine, Clean Catch Updated:  01/15/19 2000       Color, UA Yellow       Clarity, UA Clear       Specific Gravity, UA 1 015       pH, UA 5 5       Leukocytes, UA Negative       Nitrite, UA Negative       Protein, UA Negative mg/dl         Glucose, UA Negative mg/dl         Ketones, UA Negative mg/dl         Urobilinogen, UA 0 2 E U /dl         Bilirubin, UA Negative       Blood, UA Moderate (A)     CT head:pending    EKG:Normal sinus rhythm  Left axis deviation  Right bundle branch block  Anteroseptal infarct (cited on or before 15-MARIANNA-2019)  Abnormal ECG    ED Treatment:   Medication Administration from 01/15/2019 1830 to 01/15/2019 2241       Date/Time Order Dose Route Action Action by Comments     01/15/2019 8756 sodium chloride 0 9 % bolus 1,000 mL 1,000 mL Intravenous New Bag Yaakov Santamaria RN         Past Medical/Surgical History:    Active Ambulatory Problems     Diagnosis Date Noted    Parkinson's disease with use of electrical brain stimulation ([facility] [address] ) 04/12/2018    S/P deep brain stimulator placement 04/13/2018    Drooling 04/13/2018    Dysphagia 10/15/2018    Pancreatic cyst 12/04/2018    Generalized weakness 12/04/2018    Acute metabolic encephalopathy 46/38/7429    Type 2 diabetes mellitus ([facility] ) 12/04/2018    HTN (hypertension) 12/04/2018    History of CVA (cerebrovascular accident) 12/04/2018    Hypoglycemia      Resolved Ambulatory Problems     Diagnosis Date Noted    No Resolved Ambulatory Problems     Past Medical History:   Diagnosis Date    Arthritis     BPH (benign prostatic hyperplasia)     Diabetes ([facility] )     Heart problem     Movement disorder     Parkinson's disease ([facility] )     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     Stroke [facility])      Admitting Diagnosis: Weakness [R53 1]  Ambulatory dysfunction [R26 2]  Age/Sex: [de-identified] y o  male  Assessment/Plan: [de-identified] yo male with ambulatory dysfunction since December hospitalization and sleeping more than usual,also tongue was having worsening protrusion concerning for possible CVA presentation, CT head- negative, MRI contraindicated d/t DBS for Parkinson's, PT/OT possible rehab, consult neurology, simemet, asa, lipitor, fall precautions  Dehydration, creat 1 5, [email or web address]  Anticipated Length of Stay:  Patient will be admitted on an Inpatient basis with an anticipated length of stay of   2 midnights     Justification for Hospital Stay:  Ambulatory dysfunction, acute kidney injury    Admission Orders:  INPT  SPEECH EVAL  PT/OT  FALL PRECAUTIONS  SEQ COMP DEVICE  CONSULT NEUROLOGY  NPO    Scheduled Meds:   Current Facility-Administered Medications:  aspirin 81 mg Oral QPM Makenna Morales MD    atorvastatin 80 mg Oral QPM Makenna Morales MD    carbidopa-levodopa 1 tablet Oral HS Makenna Morales MD    carbidopa-levodopa 1 tablet Oral TID Makenna Morales MD    enoxaparin 40 mg Subcutaneous Daily Makenna Morales MD    insulin glargine 20 Units Subcutaneous HS Makenna Morales MD    insulin lispro 1-6 Units Subcutaneous Q6H [address] Makenna Morales MD    metoprolol tartrate 12 5 mg Oral BID Makenna Morales MD    rOPINIRole 0 75 mg Oral TID Makenna Morales MD    sodium chloride 100 mL/hr Intravenous Continuous Makenna Morales MD Last Rate: 100 mL/hr (01/16/19 0020)     Continuous Infusions:   sodium chloride 100 mL/hr Last Rate: 100 mL/hr (01/16/19 0020)     PRN Meds:

## 2019-01-16 NOTE — PHYSICAL THERAPY NOTE
Physical Therapy Evaluation     Patient's Name: Janie Madrigal  Admitting Diagnosis  Weakness [R53 1]  Ambulatory dysfunction [R26 2]    Problem List  Patient Active Problem List   Diagnosis    Parkinson's disease with use of electrical brain stimulation ([place] [address] )    S/P deep brain stimulator placement    Drooling    Dysphagia    Pancreatic cyst    Generalized weakness    Acute metabolic encephalopathy    Type 2 diabetes mellitus ([place] [address] )    HTN (hypertension)    History of CVA (cerebrovascular accident)    Hypoglycemia    Concussion    Ambulatory dysfunction       Past Medical History  Past Medical History:   Diagnosis Date    Arthritis     BPH (benign prostatic hyperplasia)     Diabetes ([place] [address] )     Heart problem     Movement disorder     Parkinson's disease ([place] [address] )     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     Stroke [facility])        Past Surgical History  Past Surgical History:   Procedure Laterality Date    APPENDECTOMY      CARDIAC DEFIBRILLATOR PLACEMENT      CARDIAC DEFIBRILLATOR PLACEMENT      CHOLECYSTECTOMY      CORONARY ANGIOPLASTY WITH STENT PLACEMENT      DEEP BRAIN STIMULATOR PLACEMENT      EAR SURGERY      GALLBLADDER SURGERY      KNEE SURGERY      SKIN CANCER EXCISION        01/16/19 0431   Note Type   Note type Eval only   Pain Assessment   Pain Assessment No/denies pain   Pain Score No Pain   Home Living   Type of Home House   Home Layout Two level;Performs ADLs on one level; Able to live on main level with bedroom/bathroom; Ramped entrance  (1st floor set up)   Bathroom Shower/Tub Walk-in shower   Bathroom Toilet Raised   Bathroom Equipment Grab bars in shower; Shower chair;Commode;Grab bars around toilet   [address] Wheelchair-manual;Walker;Cane;Life alert   Additional Comments ambulatory with rollator, sleeps in recliner/lift chair   Prior Function   Level of [place] Needs assistance with ADLs and functional mobility; Needs assistance with IADLs   Lives With Spouse   Receives Help From Family;Home health;Personal care attendant  (son and DIL live locally)   ADL Assistance Needs assistance   IADLs Needs assistance   Falls in the last 6 months 1 to 4   Vocational Retired   Comments per chart review, pt "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 + home PT/OT/ST/RN 2x/wk" - CM to confirm   Restrictions/Precautions   Weight Bearing Precautions Per Order No   Braces or Orthoses (none per pt)   Other Precautions Chair Alarm; Bed Alarm; Fall Risk   General   Family/Caregiver Present No   Cognition   Overall Cognitive Status WFL   Arousal/Participation Alert   Orientation Level Oriented X4   Memory Within functional limits   Following Commands Follows all commands and directions without difficulty   Comments pt agreeable to PT evaluation   RUE Assessment   RUE Assessment X  (AROM WFL, grossly 3+/5, (+) tremors)   LUE Assessment   LUE Assessment X  (AROM WFL, grossly 3+/5, (+) tremors)   RLE Assessment   RLE Assessment X  (grossly 3+/5)   LLE Assessment   LLE Assessment X  (grossly 3+/5)   Coordination   Movements are Fluid and Coordinated 0  ((+) tremors)   Sensation WFL   Light Touch   RLE Light Touch Grossly intact   LLE Light Touch Grossly intact   Bed Mobility   Supine to Sit 3  Moderate assistance   Additional items Assist x 2;HOB elevated; Bedrails; Increased time required;Verbal cues   Sit to Supine Unable to assess  (pt OOB to recliner post session, chair alarm on)   Transfers   Sit to Stand 4  Minimal assistance   Additional items Assist x 2;Armrests; Increased time required;Verbal cues   Stand to Sit 4  Minimal assistance   Additional items Assist x 2;Armrests; Increased time required;Verbal cues   Additional Comments soft care cushion donned to chair   Ambulation/Elevation   Gait pattern Improper Weight shift; Shuffling; Festenating; Inconsistent son; Short stride; Step to;Excessively slow; Foward flexed   Gait Assistance 4  Minimal assist   Additional items Assist x 2;Verbal cues; Tactile cues  (for RW management)   Assistive Device Rolling walker   Distance 5'   Balance   Static Sitting Fair  (lateral lean towards L)   Dynamic Sitting Poor +   Static Standing Poor +   Dynamic Standing Poor   Ambulatory Poor   Endurance Deficit   Endurance Deficit Yes   Activity Tolerance   Activity Tolerance Patient limited by [address]   Nurse Made Aware ELISABETH Cam verbalized pt appropriate to see, made aware of session outcome/recs   Assessment   Prognosis Good   Problem List Decreased strength;Decreased endurance; Impaired balance;Decreased mobility; Decreased coordination   Assessment Pt is [de-identified] y o  male seen for PT evaluation on 1/16/2019 s/p admit to [facility] on 1/15/2019 w/ Ambulatory dysfunction  Pt presents with generalized weakness, increased fatigue, difficulty ambulating, increased tremors, and drooling  PT consulted to assess pt's functional mobility and d/c needs  Order placed for PT eval and tx, w/ activity as tolerated order  Performed at least 2 patient identifiers during session: Name and wristband  Comorbidities affecting pt's physical performance at time of assessment include: arthritis, BPH, diabetes, heart problem, movement disorder, Parkinson's disease, presence of internal cardiac defibrillator and pacemaker, stroke, deep brain stimulator  PTA, pt was independent w/ all functional mobility w/ rollator, ambulates household distances and lives w/ wife in 2 level home (1st floor set up with ramped entrance)  Personal factors affecting pt at time of IE include: inaccessible home environment, ambulating w/ assistive device, inability to ambulate household distances, inability to navigate level surfaces w/o external assistance, limited home support, positive fall history and decreased initiation and engagement   Please find objective findings from PT assessment regarding body systems outlined above with impairments and limitations including weakness, impaired balance, decreased endurance, impaired coordination, gait deviations, decreased activity tolerance and fall risk, as well as mobility assessment (need for min-mod A x2 assist w/ all phases of mobility when usually ambulating independently and need for cueing for mobility technique)  The following objective measures performed on IE also reveal limitations: Barthel Index: 30/100 and Modified [place]: 4 (moderate/severe disability)  Pt's clinical presentation is currently unstable/unpredictable seen in pt's presentation of need for input for task focus and mobility technique and ongoing medical assessment  Pt to benefit from continued PT tx to address deficits as defined above and maximize level of functional independent mobility and consistency  From PT/mobility standpoint, recommendation at time of d/c would be STR pending progress in order to facilitate return to PLOF  Barriers to Discharge Inaccessible home environment;Decreased caregiver support   Goals   Patient Goals to get OOB   STG Expiration Date 01/26/19   Short Term Goal #1 In 7-10 days: Increase bilateral LE strength 1/2 grade to facilitate independent mobility, Perform all bed mobility tasks with SBA to decrease caregiver burden, Perform all transfers with SBA to improve independence, Ambulate > 100 ft  with RW with SBA w/o LOB and w/ normalized gait pattern 100% of the time, Increase all balance 1/2 grade to decrease risk for falls, Complete exercise program independently, Tolerate 4 hr OOB to faciliate upright tolerance, Improve Barthel Index score to 45 or greater to facilitate independence and PT provider will perform functional balance assessment to determine fall risk   Treatment Day 0   Plan   Treatment/Interventions Functional transfer training;LE strengthening/ROM; Therapeutic exercise; Endurance training;Patient/family training;Equipment eval/education; Bed mobility;Gait training;Spoke to MD;Spoke to nursing   PT Frequency 5x/wk   Recommendation   Recommendation Short-term skilled PT;OT consult; Rehab consult   Equipment Recommended Walker  (continue with RW)   PT - OK to Discharge Yes  (when medically cleared if to STR)   Modified [place] Scale   Modified [place] Scale 4   Barthel Index   Feeding 5   Bathing 0   Grooming Score 0   Dressing Score 5   Bladder Score 5   Bowels Score 5   Toilet Use Score 5   Transfers (Bed/Chair) Score 5   Mobility (Level Surface) Score 0   Stairs Score 0   Barthel Index Score 30           Maye Goff, PT, DPT

## 2019-01-16 NOTE — DISCHARGE INSTR - DIET
Recommendation: Level 2 dysphagia/mechanical soft diet c THIN Liquid  Pt/family aware of results of VBS of 11/1/18 including episodes of silent aspiration c thin liquid, comprehend risk, desire mechanical soft diet, THIN liquid)  Preferred items (often eaten) include:  Scrambled eggs, catsup, pepper, chicken noodle soup, strawberry jello, apple juice

## 2019-01-16 NOTE — SPEECH THERAPY NOTE
Speech-Language Pathology Bedside Swallow Evaluation      Patient Name: Kimmie Arias  Today's Date: 1/16/2019     Problem List  Patient Active Problem List   Diagnosis    Parkinson's disease with use of electrical brain stimulation ([facility] )    S/P deep brain stimulator placement    Drooling    Dysphagia    Pancreatic cyst    Generalized weakness    Acute metabolic encephalopathy    Type [facility] mellitus ([facility]    HTN (hypertension)    History of CVA (cerebrovascular accident)    Hypoglycemia    Concussion    Ambulatory dysfunction       Past Medical History  Past Medical History:   Diagnosis Date    Arthritis     BPH (benign prostatic hyperplasia)     Diabetes ([facility]     Heart problem     Movement disorder     Parkinson[facility] disease ([facility]     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)        Past Surgical History  Past Surgical History:   Procedure Laterality Date    APPENDECTOMY      CARDIAC DEFIBRILLATOR PLACEMENT      CARDIAC DEFIBRILLATOR PLACEMENT      CHOLECYSTECTOMY      CORONARY ANGIOPLASTY WITH STENT PLACEMENT      DEEP BRAIN STIMULATOR PLACEMENT      EAR SURGERY      GALLBLADDER SURGERY      KNEE SURGERY      SKIN CANCER EXCISION         Summary   Pt presented with h/o and s/s suggestive of moderate oral/pharyngeal dysphagia  Symptoms or concerns included tongue "pump" transfer, weak tongue drive, and per VBS (impaired lingual retraction; frequent post-swallow accumulation of residuals; silent aspiration during and after the swallow with thin liquids; and passive laryngeal penetration of residuals after the swallow with other textures)  He tolerated 6 ozs whipped fruit, 2/3 serving of moistened rice krispies, 5ozs of thick liquids (2 honey and 3 nectar thick) and 2ozs thin water c no change in BS or O2 sats c intake  Level 2 dysphagia diet c nectar thick liquid recommended after VBS 11/1/18   Pt & family initially declined thickened liquid  They are now are strongly considering placement at nursing/rehab center  Risk for Aspiration: identified on VBS of 11/1/18    Recommendations: mechanically altered/level 2 diet, nectar thick liquids and consider allowing ice/thin water ad omega between meals     Recommended Form of Meds: whole with puree or honey thick liquid for maximized safety     Aspiration precautions and compensatory swallowing strategies: upright posture, only feed when fully alert, slow rate of feeding and small bites/sips        Per H & P  CHIEF COMPLAINT   weakness, tongue protrusion  HISTORY OF PRESENT ILLNESS  Armani Parikh  is an 29-year-old male with past medical history notable for prior CVA, Parkinson's disease with deep brain stimulator  Also has a pacemaker placement history  He lives at home and has home PT/OT that comes 2 times per week  Today, he was brought in by his daughter in law for having worsening weakness over the last 2 days  She noticed also that his tongue was protruded more than usual and she was concerned that he might have a stroke  At baseline, she reports that he has some tongue protrusion and drooling due to his Parkinson's  He was last hospitalized in December for weakness which was thought to be from hypoglycemia  At that time, his insulin regimen was decreased from 35 units of long-acting insulin to 20 units daily  His daughter-in-law states that since his return, he has been weak progressively getting weaker in [address] [facility] [address]  Over the last 2 days, he has become so weak that he is unable to lift himself out of her lift chair which could normally do  No falls reported  Patient himself denies any fevers, chills, no coughs  He is reported to have poor p o  Intake including liquids  No nausea, vomiting, abdominal pain, diarrhea reported    He is reported to be sleeping more than usual since his last discharge in December      In the ED, given report of worsening facial asymmetry and tongue protrusion, he had a CT scan of the head which was negative for any acute abnormalities  His labs were notable for a creatinine of 1 5 with baseline around 1 0  Daughter-in-law was requesting inpatient admission for evaluation of rehab services for consideration of rehab placement given ambulatory dysfunction  Past medical history:  Please see H&P for details  Note: VBS was completed 11/1 and suggested:  moderate oral and pharyngeal dysphagia characterized by: profoundly impaired lingual retraction; frequent post-swallow accumulation of residuals; silent aspiration during and after the swallow with thin liquids; and passive laryngeal penetration of residuals after the swallow with other textures  Passive laryngeal penetration to the vocal folds was observed with puree and did result in an immediate cough  Strongly suspect that the patient has reduced oropharyngeal sensation  Incidental findings included intermittent reduced opening of the upper esophageal sphincter  Recommendations made at that time:  1  Soft/moist solid diet with nectar-thick liquids  2  SMALL bites and sips  3  After every 2-3 swallows of food or liquid, cough or clear the throat, and swallow an additional time  This will help clear residuals from the throat  4  Medications crushed and mixed with puree (pudding, applesauce, yogurt, etc)  Very small pills can be taken one at a time in a small spoonful of puree  5  Consider outpatient or home health speech therapy treatment for dysphagia, with potential treatment targets to include pharyngeal strength, lingual strength and coordination, volitional cough/clear as described above, and strategies for safer intake  6  Consider gastroenterology consult to investigate esophageal function  Note:  Results and recommendations were discussed in detail with the patient and his daughter-in-law Patricio Smyth immediately following the study      Clinical Swallowing Evaluation was completed 12/5/2018  Impression  Pt presents with severe oral and pharyngeal dysphagia, somewhat deteriorated from his presentation on outpatient video swallow performed by me on 11/1/18; he is likely to be both silently and overtly aspirating liquids and solids  D/w the pt, his dtr Cathy Lilian, and his wife Kandi at length  They understand the risk of aspiration and pneumonia, and would like for the patient to continue to eat and drink what he likes given his current quality of life  They do not wish to consider alternative means of nutrition on either a temporary or permanent basis      Recommendations  In light of the patient and family's wishes, recommend Dysphagia 2/Mechanical Soft and Thin Liquid diet  Thin liquids via cup -- no straws  Assist pt with all PO  Meds whole, 1 at a time, with liquid  Aspiration precautions including upright positioning for all PO  ST will continue to follow as needed for dysphagia tx including continued patient and family education, and reinforcement of strategies  The patient and family may benefit from a palliative care consult to assist with planning of future care  Swallow Information   Current Risks for Dysphagia & Aspiration: known history of PD, CVA and significant dysphagia     Current Diet: NPO      Baseline Diet: mechanically altered/level 2 diet and thin liquids      Baseline Assessment   Behavior/Cognition: alert    Speech/Language Status: able to follow commands and able to express needs c ease/no language dysfunction but pt with at least moderate dysarthria  Patient Positioning: upright in bed    Pain Status/Interventions/Response to Interventions:  No report of or nonverbal indications of pain         Swallow Mechanism Exam     Facial: left facial droop  Labial: decreased strength  Lingual: left sided tongue deviation and anterior carriage (tongue protrudes anteriorly)  Velum: symmetrical  Mandible: adequate ROM  Dentition: edentulous  Vocal quality:clear/adequate   Volitional Cough: weak     Consistencies Assessed and Performance   Consistencies Administered: thin liquids, nectar thick, honey thick, puree and mechanical soft solids  Specific materials administered included 2ozs thin, 3 of nectar, 2 of honey, 6 of puree and 2/3 serving of moist rice krispies  Oral Stage: moderate  Lingual "mashing" mastication pattern noted c rice krispies  Bolus formation and transfer appeared to be moderately impaired c transfer achieved 1* via lingual "pumping" and with mild lingual/ oral residue noted  Risk for reduced oral control including premature spillage during lingual "pumping" to transfer material     Pharyngeal Stage: suspected and moderate    Swallow Mechanics:  Swallowing initiation appeared mildly delayed  Laryngeal rise was palpated and judged to be within functional limits  No coughing, throat clearing, change in vocal quality or respiratory status noted today but pt c known h/o silent aspiration (aspiration c thin liquid>food residue)    Esophageal Concerns: known reduction in opening of UES (upper esophageal sphincter)    Strategies and Efficacy: encouraged small bites/sips      Summary and Recommendations (see above)  Billed for Evaluation and session given extensive hx review completed, evaluation completed then caregiver training completed (c RN, CNA)  Recommendations posted  Plan: discussions to continue with pt, family, MD re: POC    Results Reviewed with: patient and RN     Follow-up Recommended:  Yes c pt/family & MD to discuss plan of care including food/liquid texture given hx  Goals to follow    Patient Stated Goal: "I don't want to eat too much, especially in the morning"    Dysphagia Goals per SLP: to follow on care plan  Pt education: initiated  Pt and caregivers would benefit from/require continued education

## 2019-01-16 NOTE — ASSESSMENT & PLAN NOTE
Cont home BB  Holding home Acei and HCTZ given concern for AIDA likely from prerenal/dehydration cause

## 2019-01-16 NOTE — SPEECH THERAPY NOTE
I spoke c dtr-in-law [place] at length by phone  We discussed hx including VBS 11/1 which revealed food retention in pharynx c risk for overflow and episodes of silent aspiration with thin liquid)  [place] reported education re: risk had been completed by SLP Kin Roman (at [facility]) and that pt received home care SLP  Pt has consistently declined thickened liquid, and to date, has not had significant respiratory/medical consequence  [place]  (and pt) agree now to Level 2 dysphagia diet (mechanical soft foods) after review of food preparation and items that are allowed  Recommendation: Level 2 dysphagia/mechanical soft diet c THIN Liquid  Pt/family aware of results of VBS of 11/1/18 including episodes of silent aspiration c thin liquid, comprehend risk, desire mechanical soft diet, THIN liquid)

## 2019-01-16 NOTE — ASSESSMENT & PLAN NOTE
Patient reported to have become progressively weak since his last hospitalization in December  He lives at home and has PT/OT who comes 2 times per week  His daughter in law states that over the past 2 days, he was unable to get out of his lift chair which is new for him  She notes that since his discharge in December, he has been sleeping more than usual   She is interested in having him evaluated by PT/OT for subacute rehab   - PT/OT ordered    -patient has Parkinson's disease and has a deep brain stimulator  In addition to weakness, it was noted that his tongue was having worsening protrusion concerning for possible CVA presentation  He has a history of CVA in the past and also does have baseline drooping and some tongue protrusion  CT of the head was negative for any acute abnormality  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  Will consult Neurology for any additional workup recommendations  Continue home Sinemet, aspirin 81 mg daily, Lipitor 80 mg daily  Fall precautions  - basic labs showing AIDA of 1 5 which is likely prerenal   Daughter-in-law states that patient does not take enough liquids at home  Dehydration could be a contributing factor to patient's weakness  Plan to hydrate patient with normal saline overnight at 100cc/hr

## 2019-01-16 NOTE — PLAN OF CARE
Problem: SLP ADULT - SWALLOWING, IMPAIRED  Goal: Initial SLP swallow eval performed  Outcome: Completed Date Met: 01/16/19

## 2019-01-16 NOTE — ED PROVIDER NOTES
History  Chief Complaint   Patient presents with    Weakness - Generalized     increased fatigue, weakness, difficulty ambulating, increasing tremors and drooling  Patient is an 77-year-old male that presents emergency department complaining by his family  Patient has complaints of becoming progressively more weak and lethargic at home  Patient denies fever, chills, chest pain, shortness of breath  Family states that he is becoming more the end more difficult to safely care for  They state that they can barely get him out of bed even with assistance of a lift chair  Patient has no complaints of chest pain or shortness of breath, but states that he is so tired  Prior to Admission Medications   Prescriptions Last Dose Informant Patient Reported? Taking?    Cyanocobalamin (VITAMIN B 12 PO) 1/15/2019 at Unknown time  Yes Yes   Sig: Take 250 mg by mouth     LANTUS SOLOSTAR 100 units/mL injection pen   No No   Sig: Inject 20 Units under the skin daily at bedtime for 30 days   aspirin 81 MG tablet 1/14/2019 at Unknown time  Yes Yes   Sig: Take 81 mg by mouth every evening     atorvastatin (LIPITOR) 80 mg tablet 1/14/2019 at Unknown time  Yes Yes   Sig: TAKE 1 TABLET BY MOUTH EVERY EVENING   carbidopa-levodopa (SINEMET CR)  mg per tablet 1/14/2019 at Unknown time  No Yes   Sig: Take 1 tablet by mouth daily at bedtime   carbidopa-levodopa (SINEMET)  mg per tablet 1/14/2019 at Unknown time  No Yes   Sig: Take 1 tablet by mouth 3 (three) times a day   Patient taking differently: Take 1 tablet by mouth 3 (three) times a day 0600; 1000; 1400    hydrochlorothiazide (HYDRODIURIL) 25 mg tablet 1/15/2019 at Unknown time  Yes Yes   Sig: Take 25 mg by mouth daily Tuesdays and fridays    lisinopril (ZESTRIL) 2 5 mg tablet 1/14/2019 at Unknown time  Yes Yes   Sig: Take 0 5 tablets by mouth daily   metFORMIN (GLUCOPHAGE-XR) 500 mg 24 hr tablet 1/15/2019 at Unknown time  Yes Yes   Sig: Take 1,000 mg by mouth 2 (two) times a day   metoprolol tartrate (LOPRESSOR) 25 mg tablet 1/15/2019 at Unknown time  Yes Yes   Sig: Take 12 5 mg by mouth 2 (two) times a day   rOPINIRole (REQUIP XL) 2 MG 24 hr tablet 1/14/2019 at Unknown time  No Yes   Sig: Take 1 tablet (2 mg total) by mouth daily at bedtime      Facility-Administered Medications: None       Past Medical History:   Diagnosis Date    Arthritis     BPH (benign prostatic hyperplasia)     Diabetes ([facility] )     Heart problem     Movement disorder     Parkinson's disease ([facility] )     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)        Past Surgical History:   Procedure Laterality Date    APPENDECTOMY      CARDIAC DEFIBRILLATOR PLACEMENT      CARDIAC DEFIBRILLATOR PLACEMENT      CHOLECYSTECTOMY      CORONARY ANGIOPLASTY WITH STENT PLACEMENT      DEEP BRAIN STIMULATOR PLACEMENT      EAR SURGERY      GALLBLADDER SURGERY      KNEE SURGERY      SKIN CANCER EXCISION         Family History   Problem Relation Age of Onset    Cancer Mother     Diabetes Father     Parkinsonism Father     Heart attack Father      I have reviewed and agree with the history as documented  Social History   Substance Use Topics    Smoking status: Former Smoker    Smokeless tobacco: Never Used    Alcohol use No        Review of Systems   Constitutional: Positive for activity change and fatigue  Respiratory: Negative for shortness of breath  Cardiovascular: Negative for chest pain  All other systems reviewed and are negative  Physical Exam  Physical Exam   Constitutional: He appears well-developed  HENT:   Head: Normocephalic and atraumatic  Eyes: Pupils are equal, round, and reactive to light  Conjunctivae and EOM are normal    Neck: Normal range of motion  Cardiovascular: Normal rate, regular rhythm and normal heart sounds  Pulmonary/Chest: Effort normal and breath sounds normal    Abdominal: Soft   Bowel sounds are normal    Neurological: He is alert  No cranial nerve deficit or sensory deficit  Diffuse motor weakness  Skin: Skin is warm  Psychiatric: He has a normal mood and affect  His behavior is normal  Judgment and thought content normal    Vitals reviewed        Vital Signs  ED Triage Vitals   Temperature Pulse Respirations Blood Pressure SpO2   01/15/19 1843 01/15/19 1843 01/15/19 1843 01/15/19 1843 01/15/19 1843   97 8 °F (36 6 °C) 67 16 141/68 95 %      Temp Source Heart Rate Source Patient Position - Orthostatic VS BP Location FiO2 (%)   01/15/19 2241 01/15/19 2000 01/15/19 2000 01/15/19 2000 --   Oral Monitor Lying Left arm       Pain Score       01/15/19 2000       No Pain           Vitals:    01/15/19 1843 01/15/19 2000 01/15/19 2115 01/15/19 2241   BP: 141/68 118/58  139/71   Pulse: 67 68 65 66   Patient Position - Orthostatic VS:  Lying  Lying       Visual Acuity  Visual Acuity      Most Recent Value   L Pupil Size (mm)  3   R Pupil Size (mm)  3          ED Medications  Medications   aspirin chewable tablet 81 mg (81 mg Oral Given 1/16/19 0032)   carbidopa-levodopa (SINEMET CR)  mg per ER tablet 1 tablet (1 tablet Oral Given 1/16/19 0043)   carbidopa-levodopa (SINEMET)  mg per tablet 1 tablet (not administered)   insulin glargine (LANTUS) subcutaneous injection 20 Units 0 2 mL (20 Units Subcutaneous Given 1/16/19 0028)   metoprolol tartrate (LOPRESSOR) partial tablet 12 5 mg (not administered)   atorvastatin (LIPITOR) tablet 80 mg (80 mg Oral Given 1/16/19 0031)   enoxaparin (LOVENOX) subcutaneous injection 40 mg (not administered)   sodium chloride 0 9 % infusion (100 mL/hr Intravenous New Bag 1/16/19 0020)   insulin lispro (HumaLOG) 100 units/mL subcutaneous injection 1-6 Units (1 Units Subcutaneous Given 1/16/19 0043)   rOPINIRole (REQUIP) tablet 0 75 mg (not administered)   sodium chloride 0 9 % bolus 1,000 mL (1,000 mL Intravenous New Bag 1/15/19 2218)       Diagnostic Studies  Results Reviewed     Procedure Component Value Units Date/Time    Lactic acid, plasma [[id number]]  (Normal) Collected:  01/15/19 2047    Lab Status:  Final result Specimen:  Blood from Arm, Right Updated:  01/15/19 2113     LACTIC ACID 1 7 mmol/L     Narrative:         Result may be elevated if tourniquet was used during collection      Troponin I [[id number]]  (Normal) Collected:  01/15/19 2030    Lab Status:  Final result Specimen:  Blood from Arm, Right Updated:  01/15/19 2059     Troponin I <0 02 ng/mL     Urine Microscopic [[id number]]  (Abnormal) Collected:  01/15/19 1954    Lab Status:  Final result Specimen:  Urine from Urine, Clean Catch Updated:  01/15/19 2008     RBC, UA 1-2 (A) /hpf      WBC, UA 0-1 (A) /hpf      Epithelial Cells Occasional /hpf      Bacteria, UA Occasional /hpf     UA w Reflex to Microscopic w Reflex to Culture [[id number]]  (Abnormal) Collected:  01/15/19 1954    Lab Status:  Final result Specimen:  Urine from Urine, Clean Catch Updated:  01/15/19 2000     Color, UA Yellow     Clarity, UA Clear     Specific Gravity, UA 1 015     pH, UA 5 5     Leukocytes, UA Negative     Nitrite, UA Negative     Protein, UA Negative mg/dl      Glucose, UA Negative mg/dl      Ketones, UA Negative mg/dl      Urobilinogen, UA 0 2 E U /dl      Bilirubin, UA Negative     Blood, UA Moderate (A)    Comprehensive metabolic panel [[id number]]  (Abnormal) Collected:  01/15/19 1858    Lab Status:  Final result Specimen:  Blood from Arm, Right Updated:  01/15/19 1925     Sodium 139 mmol/L      Potassium 3 9 mmol/L      Chloride 101 mmol/L      CO2 27 mmol/L      ANION GAP 11 mmol/L      BUN 26 (H) mg/dL      Creatinine 1 54 (H) mg/dL      Glucose 178 (H) mg/dL      Calcium 9 4 mg/dL      AST 19 U/L      ALT 11 (L) U/L      Alkaline Phosphatase 101 U/L      Total Protein 7 6 g/dL      Albumin 3 6 g/dL      Total Bilirubin 1 40 (H) mg/dL      eGFR 42 ml/min/1 73sq m     Narrative:         National Kidney Disease Education Program recommendations are as follows:  GFR calculation is accurate only with a steady state creatinine  Chronic Kidney disease less than 60 ml/min/1 73 sq  meters  Kidney failure less than 15 ml/min/1 73 sq  meters  CBC and differential [[id number]] Collected:  01/15/19 1858    Lab Status:  Final result Specimen:  Blood from Arm, Right Updated:  01/15/19 1903     WBC 5 72 Thousand/uL      RBC 4 08 Million/uL      Hemoglobin 12 8 g/dL      Hematocrit 39 1 %      MCV 96 fL      MCH 31 4 pg      MCHC 32 7 g/dL      RDW 13 2 %      MPV 9 6 fL      Platelets 404 Thousands/uL      nRBC 0 /100 WBCs      Neutrophils Relative 50 %      Immat GRANS % 0 %      Lymphocytes Relative 36 %      Monocytes Relative 11 %      Eosinophils Relative 2 %      Basophils Relative 1 %      Neutrophils Absolute 2 90 Thousands/µL      Immature Grans Absolute 0 02 Thousand/uL      Lymphocytes Absolute 2 07 Thousands/µL      Monocytes Absolute 0 60 Thousand/µL      Eosinophils Absolute 0 10 Thousand/µL      Basophils Absolute 0 03 Thousands/µL                  CT head wo contrast   Final Result by Christopher Adam MD (01/15 2047)      No acute intracranial abnormality  Workstation performed: [id number]                    Procedures  Procedures       Phone Contacts  ED Phone Contact    ED Course                               MDM  Number of Diagnoses or Management Options  Ambulatory dysfunction:   Diagnosis management comments: Patient is an 38-year-old male presents emergency department with his family  Patient's family is concerned that he is getting progressively more more weak  They state that he is very difficult to care for  They state that he is unable to help get himself out of chair  Routine blood work demonstrated a slight acute kidney injury  Patient is given IV fluids  CT scan head did not show any acute changes    Patient was discussed with hospitalist service, he is be admitted for ambulatory dysfunction         Amount and/or Complexity of Data Reviewed  Clinical lab tests: ordered and reviewed  Tests in the radiology section of CPT®: ordered and reviewed    Risk of Complications, Morbidity, and/or Mortality  Presenting problems: moderate  Diagnostic procedures: moderate  Management options: moderate    Patient Progress  Patient progress: stable    CritCare Time    Disposition  Final diagnoses:   Ambulatory dysfunction     Time reflects when diagnosis was documented in both MDM as applicable and the Disposition within this note     Time User Action Codes Description Comment    1/15/2019  8:54 PM Diandra Chime Add [S06 0X9A] Concussion     1/15/2019  8:55 PM Diandra Chime Remove [S06 0X9A] Concussion     1/15/2019  9:54 PM Diandra Chime Add [R26 2] Ambulatory dysfunction     1/15/2019 11:57 PM Makenna Morales [G20] Parkinson's disease with use of electrical brain stimulation ([facility] )     1/15/2019 11:57 PM Makenna Morales Add [K11 7] Drooling       ED Disposition     ED Disposition Condition Comment    Admit        Follow-up Information    None         Current Discharge Medication List      CONTINUE these medications which have NOT CHANGED    Details   aspirin 81 MG tablet Take 81 mg by mouth every evening        atorvastatin (LIPITOR) 80 mg tablet TAKE 1 TABLET BY MOUTH EVERY EVENING      carbidopa-levodopa (SINEMET CR)  mg per tablet Take 1 tablet by mouth daily at bedtime  Qty: 90 tablet, Refills: 3    Associated Diagnoses: Parkinson disease (HCC)      carbidopa-levodopa (SINEMET)  mg per tablet Take 1 tablet by mouth 3 (three) times a day  Qty: 270 tablet, Refills: 3    Associated Diagnoses: Parkinson disease (HCC)      Cyanocobalamin (VITAMIN B 12 PO) Take 250 mg by mouth        hydrochlorothiazide (HYDRODIURIL) 25 mg tablet Take 25 mg by mouth daily Tuesdays and fridays       lisinopril (ZESTRIL) 2 5 mg tablet Take 0 5 tablets by mouth daily      metFORMIN (GLUCOPHAGE-XR) 500 mg 24 hr tablet Take 1,000 mg by mouth 2 (two) times a day  Refills: 3      metoprolol tartrate (LOPRESSOR) 25 mg tablet Take 12 5 mg by mouth 2 (two) times a day  Refills: 0      rOPINIRole (REQUIP XL) 2 MG 24 hr tablet Take 1 tablet (2 mg total) by mouth daily at bedtime  Qty: 90 tablet, Refills: 1    Associated Diagnoses: Parkinson's disease with use of electrical brain stimulation (HCC)      LANTUS SOLOSTAR 100 units/mL injection pen Inject 20 Units under the skin daily at bedtime for 30 days  Qty: 2 pen, Refills: 0    Associated Diagnoses: Type 2 diabetes mellitus ([facility] )           No discharge procedures on file      ED Provider  Electronically Signed by           Kale Huddleston PA-C  01/16/19 5173

## 2019-01-16 NOTE — ASSESSMENT & PLAN NOTE
Lab Results   Component Value Date    HGBA1C 6 9 (H) 09/19/2015       No results for input(s): POCGLU in the last 72 hours  Blood Sugar Average: Last 72 hrs:   Holding home metformin given AIDA  Cont home lantus 20U qhs  SSI ordered q 6hrs   NPO pending swallow eval

## 2019-01-16 NOTE — CONSULTS
Consultation - Neurology   Shante Smyth  [de-identified] y o  male MRN: [id number]  Unit/Bed#: -01 Encounter: [id number]      Assessment/Plan   Assessment:  Shante Smyth  is a [de-identified] y o   male with a past medical history of Parkinson's disease diagnosed in 2005 s/p b/l DBS, DM type 2, HTN, who presents [facility] ED on 01/15/2019 with progressive weakness and lethargy since last admission in 12/2018  Likely deconditioning vs progression of Parkinson's disease  Very low suspicion for stroke, cannot obtain MRI brain d/t pacemaker  Plan:  -Cannot obtain MRI brain d/t pacemaker  -CTH unremarkable for acute intracranial abnormalities  -Continue ASA 81 mg daily and atorvastatin 80 mg every evening  -Continue with current Sinemet dose; Will leave medication adjustment for outpatient movement team  -PT/OT  -Recommend patient keeps appointment and follows up with Dr Chiqui Kevin as outpatient  -Continue supportive care per primary team, notify with any changes  -No acute neurological recommendations at this time, please call with questions  History of Present Illness     Reason for Consult / Principal Problem:  Increased weakness    HPI: Shante Smyth  is a [de-identified] y o   male with a past medical history of Parkinson's disease diagnosed in 2005 s/p b/l DBS, DM type 2, HTN, who presents to [facility] ED on 01/15/2019 with progressive weakness and lethargy since last admission in 12/2018    At baseline, patient ambulates with a walker however has some gait difficulty  Patient lives at home with wife and receives help from daughter who lives down the street  Daughter states patient has been becoming progressively weaker and more lethargic since last admission in December, and states he has significantly declined in the past week  Daughter reports patient is now exhibiting freezing when standing up from a chair and worsening of his gait    Daughter states patient is unable to get up from chair on his own which is unusual for him  She also states patient's tremor has worsened and his tongue is protruding more so than normal     Patient states PT visits him at home twice a week, however patient admits to not performing the exercises that is asked of him on his own  Daughter states patient is in his chair for a significant amount of the day and spends most of his time sleeping  Patient does follow with [facility] outpatient movement team with Dr Nola Michelle  Last visit was 04/2018 were patient's Sinemet dose was increased, next scheduled visit is 02/2018  Today patient is lying comfortably in bed  Patient admits to generalized weakness, more so with getting up from chairs  Denies CP, SOB, headache, dizziness, vision changes, N/V, abdominal pain, focal weakness, numbness, fever/chills    Inpatient consult to Neurology  Consult performed by: Clifton Dinh  Consult ordered by: Chanel Overall        Review of Systems  12 point ROS performed, as stated above, all others negative      Historical Information   Past Medical History:   Diagnosis Date    Arthritis     BPH (benign prostatic hyperplasia)     [facility]     Heart problem     Movement disorder     Parkinson's disease [facility]     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     Stroke [facility])      Past Surgical History:   Procedure Laterality Date    APPENDECTOMY      CARDIAC DEFIBRILLATOR PLACEMENT      CARDIAC DEFIBRILLATOR PLACEMENT      CHOLECYSTECTOMY      CORONARY ANGIOPLASTY WITH STENT PLACEMENT      DEEP BRAIN STIMULATOR PLACEMENT      EAR SURGERY      GALLBLADDER SURGERY      KNEE SURGERY      SKIN CANCER EXCISION       Social History   History   Alcohol Use No     History   Drug Use No     History   Smoking Status    Former Smoker   Smokeless Tobacco    Never Used     Family History:   Family History   Problem Relation Age of Onset    Cancer Mother     Diabetes Father     Parkinsonism Father    [facility] Heart attack Father        Review of previous medical records was completed  Meds/Allergies   all current active meds have been reviewed, current meds:   Current Facility-Administered Medications   Medication Dose Route Frequency    aspirin chewable tablet 81 mg  81 mg Oral QPM    atorvastatin (LIPITOR) tablet 80 mg  80 mg Oral QPM    carbidopa-levodopa (SINEMET CR)  mg per ER tablet 1 tablet  1 tablet Oral HS    carbidopa-levodopa (SINEMET)  mg per tablet 1 tablet  1 tablet Oral TID    enoxaparin (LOVENOX) subcutaneous injection 40 mg  40 mg Subcutaneous Daily    insulin glargine (LANTUS) subcutaneous injection 20 Units 0 2 mL  20 Units Subcutaneous HS    insulin lispro (HumaLOG) 100 units/mL subcutaneous injection 1-6 Units  1-6 Units Subcutaneous Q6H [address]    metoprolol tartrate (LOPRESSOR) partial tablet 12 5 mg  12 5 mg Oral BID    rOPINIRole (REQUIP) tablet 0 75 mg  0 75 mg Oral TID   , PTA meds:   Prior to Admission Medications   Prescriptions Last Dose Informant Patient Reported? Taking?    Cyanocobalamin (VITAMIN B 12 PO) 1/15/2019 at Unknown time  Yes Yes   Sig: Take 250 mg by mouth     LANTUS SOLOSTAR 100 units/mL injection pen   No No   Sig: Inject 20 Units under the skin daily at bedtime for 30 days   aspirin 81 MG tablet 1/14/2019 at Unknown time  Yes Yes   Sig: Take 81 mg by mouth every evening     atorvastatin (LIPITOR) 80 mg tablet 1/14/2019 at Unknown time  Yes Yes   Sig: TAKE 1 TABLET BY MOUTH EVERY EVENING   carbidopa-levodopa (SINEMET CR)  mg per tablet 1/14/2019 at Unknown time  No Yes   Sig: Take 1 tablet by mouth daily at bedtime   carbidopa-levodopa (SINEMET)  mg per tablet 1/14/2019 at Unknown time  No Yes   Sig: Take 1 tablet by mouth 3 (three) times a day   Patient taking differently: Take 1 tablet by mouth 3 (three) times a day 0600; 1000; 1400    hydrochlorothiazide (HYDRODIURIL) 25 mg tablet 1/15/2019 at Unknown time  Yes Yes   Sig: Take 25 mg by mouth daily Tuesdays and fridays    lisinopril (ZESTRIL) 2 5 mg tablet 1/14/2019 at Unknown time  Yes Yes   Sig: Take 0 5 tablets by mouth daily   metFORMIN (GLUCOPHAGE-XR) 500 mg 24 hr tablet 1/15/2019 at Unknown time  Yes Yes   Sig: Take 1,000 mg by mouth 2 (two) times a day   metoprolol tartrate (LOPRESSOR) 25 mg tablet 1/15/2019 at Unknown time  Yes Yes   Sig: Take 12 5 mg by mouth 2 (two) times a day   rOPINIRole (REQUIP XL) 2 MG 24 hr tablet 1/14/2019 at Unknown time  No Yes   Sig: Take 1 tablet (2 mg total) by mouth daily at bedtime      Facility-Administered Medications: None    and     Allergies   Allergen Reactions    Clopidogrel Rash     PLAVIX       Objective   Vitals:Blood pressure 138/74, pulse 74, temperature 98 1 °F (36 7 °C), temperature source Oral, resp  rate 18, height 5' 11" (1 803 m), weight 85 8 kg (189 lb 2 5 oz), SpO2 96 %  ,Body mass index is 26 38 kg/m²  Intake/Output Summary (Last 24 hours) at 01/16/19 0924  Last data filed at 01/16/19 0501   Gross per 24 hour   Intake                0 ml   Output              850 ml   Net             -850 ml       Invasive Devices: Invasive Devices     Peripheral Intravenous Line            Peripheral IV 01/15/19 Right Antecubital less than 1 day                Physical Exam   Constitutional: He appears well-developed and well-nourished  No distress  HENT:   Head: Normocephalic and atraumatic  Eyes: Pupils are equal, round, and reactive to light  Neck: Normal range of motion  Neck supple  Cardiovascular: Normal rate  Diminished heart sounds   Pulmonary/Chest: Effort normal  No respiratory distress  Abdominal: Soft  Bowel sounds are normal  There is no tenderness  Neurological: He has a normal Finger-Nose-Finger Test    Skin: Skin is warm and dry  He is not diaphoretic  Psychiatric: He has a normal mood and affect  His behavior is normal      Neurologic Exam     Mental Status   Patient is alert, lying in bed    Oriented to person, place, month  Patient initially stated it was 2008 and then corrected himself to 2019  Dysarthric speech d/t chronic tongue protrusion as well as delayed speech  No aphasia noted  Able to name the president, name objects, follow 1 and 2 step commands, answer all questions appropriately     Cranial Nerves     CN III, IV, VI   Pupils are equal, round, and reactive to light  Nystagmus: none   Conjugate gaze: present    CN V   Facial sensation intact  CN VIII   Hearing: intact    CN XI   CN XI normal      CN XII   Tongue deviation: none  Pupils constricted bilaterally with very slight reactivity  Limited vertical EOMs  Lower left facial droop noted  Tongue protruded to the left     Motor Exam    strength symmetric  BUE strength 5/5 throughout  BLE strength 5/5 throughout  Slight pronation of LUE on pronator drift testing  Increased tone noted in BUE     Sensory Exam   Light touch normal    Vibration normal    Temperature sensation intact throughout     Gait, Coordination, and Reflexes     Coordination   Finger to nose coordination: normal  No resting tremor noted  Last dose of Sinemet noted to be approximately 1 hr prior to examination  BUE reflexes 1+ throughout  BLE patellar and Achilles reflexes diminished       Lab Results: I have personally reviewed pertinent reports    Recent Results (from the past 24 hour(s))   ECG 12 lead    Collection Time: 01/15/19  6:46 PM   Result Value Ref Range    Ventricular Rate 67 BPM    Atrial Rate 67 BPM    GA Interval 174 ms    QRSD Interval 130 ms    QT Interval 410 ms    QTC Interval 433 ms    P Axis 81 degrees    QRS Axis -35 degrees    T Wave Axis -7 degrees   CBC and differential    Collection Time: 01/15/19  6:58 PM   Result Value Ref Range    WBC 5 72 4 31 - 10 16 Thousand/uL    RBC 4 08 3 88 - 5 62 Million/uL    Hemoglobin 12 8 12 0 - 17 0 g/dL    Hematocrit 39 1 36 5 - 49 3 %    MCV 96 82 - 98 fL    MCH 31 4 26 8 - 34 3 pg    MCHC 32 7 31 4 - 37 4 g/dL    RDW 13 2 11 6 - 15 1 %    MPV 9 6 8 9 - 12 7 fL    Platelets 948 035 - 218 Thousands/uL    nRBC 0 /100 WBCs    Neutrophils Relative 50 43 - 75 %    Immat GRANS % 0 0 - 2 %    Lymphocytes Relative 36 14 - 44 %    Monocytes Relative 11 4 - 12 %    Eosinophils Relative 2 0 - 6 %    Basophils Relative 1 0 - 1 %    Neutrophils Absolute 2 90 1 85 - 7 62 Thousands/µL    Immature Grans Absolute 0 02 0 00 - 0 20 Thousand/uL    Lymphocytes Absolute 2 07 0 60 - 4 47 Thousands/µL    Monocytes Absolute 0 60 0 17 - 1 22 Thousand/µL    Eosinophils Absolute 0 10 0 00 - 0 61 Thousand/µL    Basophils Absolute 0 03 0 00 - 0 10 Thousands/µL   Comprehensive metabolic panel    Collection Time: 01/15/19  6:58 PM   Result Value Ref Range    Sodium 139 136 - 145 mmol/L    Potassium 3 9 3 5 - 5 3 mmol/L    Chloride 101 100 - 108 mmol/L    CO2 27 21 - 32 mmol/L    ANION GAP 11 4 - 13 mmol/L    BUN 26 (H) 5 - 25 mg/dL    Creatinine 1 54 (H) 0 60 - 1 30 mg/dL    Glucose 178 (H) 65 - 140 mg/dL    Calcium 9 4 8 3 - 10 1 mg/dL    AST 19 5 - 45 U/L    ALT 11 (L) 12 - 78 U/L    Alkaline Phosphatase 101 46 - 116 U/L    Total Protein 7 6 6 4 - 8 2 g/dL    Albumin 3 6 3 5 - 5 0 g/dL    Total Bilirubin 1 40 (H) 0 20 - 1 00 mg/dL    eGFR 42 ml/min/1 73sq m   UA w Reflex to Microscopic w Reflex to Culture    Collection Time: 01/15/19  7:54 PM   Result Value Ref Range    Color, UA Yellow     Clarity, UA Clear     Specific [place], UA 1 015 1 003 - 1 030    pH, UA 5 5 4 5 - 8 0    Leukocytes, UA Negative Negative    Nitrite, UA Negative Negative    Protein, UA Negative Negative mg/dl    Glucose, UA Negative Negative mg/dl    Ketones, UA Negative Negative mg/dl    Urobilinogen, UA 0 2 0 2, 1 0 E U /dl E U /dl    Bilirubin, UA Negative Negative    Blood, UA Moderate (A) Negative   Urine Microscopic    Collection Time: 01/15/19  7:54 PM   Result Value Ref Range    RBC, UA 1-2 (A) None Seen, 0-5 /hpf    WBC, UA 0-1 (A) None Seen, 0-5, 5-55, 5-65 /hpf    Epithelial Cells Occasional None Seen, Occasional /hpf    Bacteria, UA Occasional None Seen, Occasional /hpf   Troponin I    Collection Time: 01/15/19  8:30 PM   Result Value Ref Range    Troponin I <0 02 <=0 04 ng/mL   Lactic acid, plasma    Collection Time: 01/15/19  8:47 PM   Result Value Ref Range    LACTIC ACID 1 7 0 5 - 2 0 mmol/L   Fingerstick Glucose (POCT)    Collection Time: 01/16/19 12:27 AM   Result Value Ref Range    POC Glucose 157 (H) 65 - 140 mg/dl   Fingerstick Glucose (POCT)    Collection Time: 01/16/19  5:33 AM   Result Value Ref Range    POC Glucose 124 65 - 140 mg/dl   Basic metabolic panel    Collection Time: 01/16/19  5:35 AM   Result Value Ref Range    Sodium 141 136 - 145 mmol/L    Potassium 3 7 3 5 - 5 3 mmol/L    Chloride 107 100 - 108 mmol/L    CO2 27 21 - 32 mmol/L    ANION GAP 7 4 - 13 mmol/L    BUN 20 5 - 25 mg/dL    Creatinine 1 09 0 60 - 1 30 mg/dL    Glucose 123 65 - 140 mg/dL    Calcium 8 9 8 3 - 10 1 mg/dL    eGFR 64 ml/min/1 73sq m   ]  Imaging Studies: I have personally reviewed pertinent reports and I have personally reviewed pertinent films in PACS  EKG, Pathology, and Other Studies: I have personally reviewed pertinent reports  VTE Prophylaxis: Sequential compression device (Venodyne)  and Enoxaparin (Lovenox)    Counseling / Coordination of Care  Total time spent today 45 minutes  Greater than 50% of total time was spent with the patient and/or family counseling and/or coordination of care  A description of the counseling/coordination of care:  Patient was seen and evaluated  Discussed with attending  Chart reviewed thoroughly including laboratory and imaging studies    Plan of care discussed with patient and primary team

## 2019-01-17 LAB
GLUCOSE SERPL-MCNC: 118 MG/DL (ref 65–140)
GLUCOSE SERPL-MCNC: 166 MG/DL (ref 65–140)
GLUCOSE SERPL-MCNC: 187 MG/DL (ref 65–140)
GLUCOSE SERPL-MCNC: 194 MG/DL (ref 65–140)
GLUCOSE SERPL-MCNC: 226 MG/DL (ref 65–140)

## 2019-01-17 PROCEDURE — 97110 THERAPEUTIC EXERCISES: CPT

## 2019-01-17 PROCEDURE — 82948 REAGENT STRIP/BLOOD GLUCOSE: CPT

## 2019-01-17 PROCEDURE — 97116 GAIT TRAINING THERAPY: CPT

## 2019-01-17 PROCEDURE — 99222 1ST HOSP IP/OBS MODERATE 55: CPT | Performed by: PHYSICAL MEDICINE & REHABILITATION

## 2019-01-17 PROCEDURE — 99232 SBSQ HOSP IP/OBS MODERATE 35: CPT | Performed by: INTERNAL MEDICINE

## 2019-01-17 RX ADMIN — ROPINIROLE 0.75 MG: 0.25 TABLET, FILM COATED ORAL at 08:43

## 2019-01-17 RX ADMIN — INSULIN GLARGINE 20 UNITS: 100 INJECTION, SOLUTION SUBCUTANEOUS at 21:25

## 2019-01-17 RX ADMIN — CARBIDOPA AND LEVODOPA 1 TABLET: 25; 100 TABLET ORAL at 13:45

## 2019-01-17 RX ADMIN — ATORVASTATIN CALCIUM 80 MG: 40 TABLET, FILM COATED ORAL at 17:32

## 2019-01-17 RX ADMIN — INSULIN LISPRO 2 UNITS: 100 INJECTION, SOLUTION INTRAVENOUS; SUBCUTANEOUS at 13:45

## 2019-01-17 RX ADMIN — ROPINIROLE 0.75 MG: 0.25 TABLET, FILM COATED ORAL at 21:25

## 2019-01-17 RX ADMIN — INSULIN LISPRO 1 UNITS: 100 INJECTION, SOLUTION INTRAVENOUS; SUBCUTANEOUS at 17:34

## 2019-01-17 RX ADMIN — CARBIDOPA AND LEVODOPA 1 TABLET: 25; 100 TABLET ORAL at 08:43

## 2019-01-17 RX ADMIN — ASPIRIN 81 MG 81 MG: 81 TABLET ORAL at 17:32

## 2019-01-17 RX ADMIN — INSULIN LISPRO 2 UNITS: 100 INJECTION, SOLUTION INTRAVENOUS; SUBCUTANEOUS at 21:28

## 2019-01-17 RX ADMIN — CARBIDOPA AND LEVODOPA 1 TABLET: 25; 100 TABLET ORAL at 18:49

## 2019-01-17 RX ADMIN — ROPINIROLE 0.75 MG: 0.25 TABLET, FILM COATED ORAL at 17:32

## 2019-01-17 RX ADMIN — CARBIDOPA AND LEVODOPA 1 TABLET: 50; 200 TABLET, EXTENDED RELEASE ORAL at 21:25

## 2019-01-17 RX ADMIN — METOPROLOL TARTRATE 12.5 MG: 25 TABLET ORAL at 17:32

## 2019-01-17 RX ADMIN — INSULIN LISPRO 1 UNITS: 100 INJECTION, SOLUTION INTRAVENOUS; SUBCUTANEOUS at 00:52

## 2019-01-17 RX ADMIN — ENOXAPARIN SODIUM 40 MG: 40 INJECTION SUBCUTANEOUS at 08:47

## 2019-01-17 RX ADMIN — METOPROLOL TARTRATE 12.5 MG: 25 TABLET ORAL at 08:43

## 2019-01-17 NOTE — CONSULTS
PHYSICAL MEDICINE AND REHABILITATION CONSULT NOTE  Lorena Riley  [de-identified] y o  male MRN: [id number]  Unit/Bed#: -01 Encounter: [id number]    Requested by (Physician/Service): Mercedes Fatima MD  Reason for Consultation:  Assessment of rehabilitation needs  Reason for Hospitalization:  Weakness [R53 1]  Ambulatory dysfunction [R26 2]      History of Present Illness:  Lorena Riley  is a [de-identified] y o  male who  has a past medical history of Arthritis; BPH (benign prostatic hyperplasia); Diabetes ([facility] [address] [address] ); Heart problem; Movement disorder; Parkinson's disease ([facility] ); 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; and Stroke ([facility] )  who presented to the [address] [place] [address] with generalized weakness and gait instability  At baseline patient ambulates with a walker however has some gait difficulty  As per the family patient has been exhibiting freezng when standing from a chair and worsening of his gait  Also it  was noted that his tremor has been worsening   Neurology was consulted and no adjustments were made in the the PD meds  A     PM&R consulted for rehabilitation recommendations  Restrictions include:  none    Hospital Complications/Comorbidities:   Complications: As above  Comorbidities: As above    Morbid Obesity (BMI > 40) No     Last Weight Last BMI   Wt Readings from Last 1 Encounters:   01/16/19 85 8 kg (189 lb 2 5 oz)    Body mass index is 26 38 kg/m²       Functional History:     Prior to Admission:     Needs assistance with ADLs and IADLs     Present:  Physical Therapy Occupational Therapy Speech Therapy   Minimal assistance with transfers  Severe oral and pharyngeal dysphagia     Past Medical History:   Past Surgical History:   Family History:     Past Medical History:   Diagnosis Date    Arthritis     BPH (benign prostatic hyperplasia)     Diabetes ([facility] )     Heart problem     Movement disorder     [facility]'s disease ([facility]     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)     Past Surgical History:   Procedure Laterality Date    APPENDECTOMY      CARDIAC DEFIBRILLATOR PLACEMENT      CARDIAC DEFIBRILLATOR PLACEMENT      CHOLECYSTECTOMY      CORONARY ANGIOPLASTY WITH STENT PLACEMENT      DEEP BRAIN STIMULATOR PLACEMENT      EAR SURGERY      GALLBLADDER SURGERY      KNEE SURGERY      SKIN CANCER EXCISION       Family History   Problem Relation Age of Onset    Cancer Mother     Diabetes Father     Parkinsonism Father     Heart attack Father      - No family history of neurologic diseases  Medications:    Current Facility-Administered Medications:     aspirin chewable tablet 81 mg, 81 mg, Oral, QPM, Makenna Morales MD, 81 mg at 01/16/19 1842    atorvastatin (LIPITOR) tablet 80 mg, 80 mg, Oral, QPM, Makenna Morales MD, 80 mg at 01/16/19 1840    carbidopa-levodopa (SINEMET CR)  mg per ER tablet 1 tablet, 1 tablet, Oral, HS, Makenna Morales MD, 1 tablet at 01/16/19 2101    carbidopa-levodopa (SINEMET)  mg per tablet 1 tablet, 1 tablet, Oral, TID, Makenna Morales MD, 1 tablet at 01/17/19 0843    enoxaparin (LOVENOX) subcutaneous injection 40 mg, 40 mg, Subcutaneous, Daily, Makenna Morales MD, 40 mg at 01/17/19 0847    insulin glargine (LANTUS) subcutaneous injection 20 Units 0 2 mL, 20 Units, Subcutaneous, HS, Makenna Morales MD, 20 Units at 01/16/19 2101    insulin lispro (HumaLOG) 100 units/mL subcutaneous injection 1-6 Units, 1-6 Units, Subcutaneous, Q6H [address], 1 Units at 01/17/19 0052 **AND** Fingerstick Glucose (POCT), , , Q6H, Makenna Morales MD    metoprolol tartrate (LOPRESSOR) partial tablet 12 5 mg, 12 5 mg, Oral, BID, Makenna Morales MD, 12 5 mg at 01/17/19 0843    rOPINIRole (REQUIP) tablet 0 75 mg, 0 75 mg, Oral, TID, Makenna Morales MD, 0 75 mg at 01/17/19 0843    Allergies:    Allergies   Allergen Reactions    Clopidogrel Rash     PLAVIX        Social History:    Social History     Social History    Marital status: /Civil Union     Spouse name: N/A    Number of children: N/A    Years of education: N/A     Social History Main Topics    Smoking status: Former Smoker    Smokeless tobacco: Never Used    Alcohol use No    Drug use: No    Sexual activity: Not Asked     Other Topics Concern    None     Social History Narrative    None        Aminata Mack  Lives with: Family  He lives in a(n) duplex home      Review of Systems: A 10-point review of systems was performed  Negative except as listed above  Physical Exam:  Vital Signs:      Temp:  [97 3 °F (36 3 °C)-98 3 °F (36 8 °C)] 97 3 °F (36 3 °C)  HR:  [] 61  Resp:  [18] 18  BP: (135-144)/(73-75) 135/75   Intake/Output Summary (Last 24 hours) at 01/17/19 1221  Last data filed at 01/17/19 0401   Gross per 24 hour   Intake              360 ml   Output              500 ml   Net             -140 ml        Laboratory:      Lab Results   Component Value Date    HGB 12 8 01/15/2019    HGB 12 9 10/01/2015    HCT 39 1 01/15/2019    HCT 38 7 10/01/2015    WBC 5 72 01/15/2019    WBC 6 06 10/01/2015     Lab Results   Component Value Date    BUN 20 01/16/2019    BUN 10 10/01/2015     10/01/2015    K 3 7 01/16/2019    K 4 5 10/01/2015     01/16/2019     10/01/2015    GLUCOSE 92 10/01/2015    CREATININE 1 09 01/16/2019    CREATININE 0 96 10/01/2015     Lab Results   Component Value Date    PROTIME 13 1 09/04/2015    INR 1 05 09/04/2015        General: alert, no apparent distress, cooperative and comfortable  Head: Normal, normocephalic, atraumatic  Eye: Normal external eye, conjunctiva, lids   Ears: Normal external ears  Nose: Normal external nose, mucus membranes  Neck / Thyroid: Supple, no masses, nodes, nodules or enlargement    Pulmonary: clear to auscultation bilaterally and no crackles, no wheezes, chest expansion normal  Cardiovascular: normal rate, regular rhythm, normal S1, S2, no murmurs, rubs, clicks or gallops  Abdomen: soft, nontender, nondistended, no masses or organomegaly  Skin/Extremity: no rashes, no erythema, no peripheral edema  Neurologic:  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 as well as delayed speech  No aphasia  Pulses all questions appropriately  Cranial nerves 2-12 are intact  A normal tone and bulk on motor examination, lower resting tremor, bilateral upper extremity reflexes are 1+ throughout bilateral patellar and Achilles tendon reflexes are diminished  The sensations are intact to light touch bilaterally  Increased tone noted in bilateral upper extremities  Psych: Appropriate affect, alert and oriented to person, place and time   Musculoskeletal - Strength:   Right  Left  Site  Right  Left  Site    5 5  S Ab: Shoulder Abductors  5  5  HF: Hip Flexors    5 5  EF: Elbow Flexors  5 5 KF: Knee Flexors    5  5  EE: Elbow Extensors  5  5  KE: Knee Extensors    5  5  WE: Wrist Extensors  5  5  DR: Dorsi Flexors    5  5  FF: Finger Flexors  5  5  PF: Plantar Flexors    5  5  HI: Hand Intrinsics  5  5  EHL: Extensor Hallucis Longus         Imaging: Reviewed  Ct Head Wo Contrast    Result Date: 1/15/2019  Impression: No acute intracranial abnormality  Workstation performed: [id number]       Assessment and Recommendations:  [de-identified]year-old male with history of Parkinson's disease and worsening gait  secondary to deconditioning    Impairments:  Impaired functional mobility and ability to perform ADL's  The    Recommendations:  - Chart reviewed  - Imaging reviewed   - Continue PT/OT/SLP while inpatient        - Pt is unable to safely return home until he is at the supervision/contact-guard functional level (25% assistance level with or without a device)  modified-independent functional level (0% assistance with a device) independent functional level (0% assistance without a device)      -Based on the patient's prior and current functional status, and ability to tolerate 3 hrs of therapy per day, we believe he is a good acute inpatient rehab candidate (3 hours of therapy a day, medical and nursing care, highest level of rehab)  - Disposition unclear at this point in time but inpatient rehab a possibility in the near future pending achievement of clinically stability, potential for functional progress  Thank you for allowing the PM&R service to participate in the care of this patient  We will continue to follow Hailey Walker Jr 's progress with you  Please do not hesitate to call with questions or concerns    ** Please Note: Fluency Direct voice to text software may have been used in the creation of this document   **

## 2019-01-17 NOTE — PLAN OF CARE
Problem: PHYSICAL THERAPY ADULT  Goal: Performs mobility at highest level of function for planned discharge setting  See evaluation for individualized goals  Treatment/Interventions: Functional transfer training, LE strengthening/ROM, Therapeutic exercise, Endurance training, Patient/family training, Equipment eval/education, Bed mobility, Gait training, Spoke to MD, Spoke to nursing  Equipment Recommended: Marsha Socks (continue with RW)       See flowsheet documentation for full assessment, interventions and recommendations  Outcome: Progressing  Prognosis: Good  Problem List: Decreased strength, Decreased endurance, Impaired balance, Decreased mobility, Decreased coordination  Assessment: Pt seen for PT treatment session this date with interventions consisting of gait training w/ emphasis on improving pt's ability to ambulate level surfaces x 50' x2 with min A provided by therapist with RW and Therapeutic exercise consisting of: AROM 10 reps B LE in sitting position  Pt agreeable to PT treatment session upon arrival, pt found seated OOB in recliner, in no apparent distress and responsive  In comparison to previous session, pt with improvements in household gait distance trials, no overt LOB, min A for all phases of mobility, initiation of seated ther ex in pain free range to tolerance  Post session: pt returned back to recliner, chair alarm engaged, all needs in reach and RN notified of session findings/recommendations  Continue to recommend STR at time of d/c in order to maximize pt's functional independence and safety w/ mobility  Pt continues to be functioning below baseline level, and remains limited 2* factors listed above  PT will continue to see pt while here in order to address the deficits listed above and provide interventions consistent w/ POC in effort to achieve STGs    Barriers to Discharge: Inaccessible home environment, Decreased caregiver support     Recommendation: Short-term skilled PT, OT consult, Rehab consult     PT - OK to Discharge: Yes (when medically cleared if to STR)    See flowsheet documentation for full assessment

## 2019-01-17 NOTE — PHYSICAL THERAPY NOTE
Physical Therapy Treatment Note       01/17/19 1316   Pain Assessment   Pain Assessment No/denies pain   Pain Score No Pain   Restrictions/Precautions   Weight Bearing Precautions Per Order No   Braces or Orthoses (none per pt)   Other Precautions Chair Alarm; Bed Alarm; Fall Risk   General   Chart Reviewed Yes   Response to Previous Treatment Patient with no complaints from previous session  Family/Caregiver Present No   Cognition   Overall Cognitive Status WFL   Arousal/Participation Alert; Cooperative  (pleasant, motivated)   Attention Within functional limits   Orientation Level Oriented X4   Memory Within functional limits   Following Commands Follows all commands and directions without difficulty   Comments pt agreeable to PT session   Subjective   Subjective "I feel good today"   Bed Mobility   Additional Comments pt received OOB in recliner upon arrival  RN reports pt has been ambulatory to/from  since admission   Transfers   Sit to Stand 4  Minimal assistance   Additional items Assist x 1; Armrests; Increased time required;Verbal cues   Stand to Sit 4  Minimal assistance   Additional items Assist x 1; Armrests; Increased time required;Verbal cues   Ambulation/Elevation   Gait pattern Improper Weight shift; Shuffling; Festenating; Inconsistent son; Short stride; Step to;Excessively slow; Foward flexed   Gait Assistance 4  Minimal assist   Additional items Assist x 1;Verbal cues; Tactile cues  (for RW management, sequencing, posture)   Assistive Device Rolling walker   Distance 50' x2   Balance   Static Sitting Good   Dynamic Sitting Fair +   Static Standing Fair -   Dynamic Standing Poor +   Ambulatory Poor +   Endurance Deficit   Endurance Deficit Yes   Activity Tolerance   Activity Tolerance Patient limited by fatigue   Nurse Made Aware RN Maria Teresa Adam verbalized pt appropriate to see, made aware of session outcome/recs   Exercises   Glute Sets Sitting;10 reps;AROM; Bilateral   Hip Abduction Sitting;10 reps;AROM; Bilateral Hip Adduction Sitting;10 reps;AROM; Bilateral   Knee AROM Long Arc Quad Sitting;10 reps;AROM; Bilateral   Ankle Pumps Sitting;10 reps;AROM; Bilateral   Marching Sitting;10 reps;AROM; Bilateral   Assessment   Prognosis Good   Problem List Decreased strength;Decreased endurance; Impaired balance;Decreased mobility; Decreased coordination   Assessment Pt seen for PT treatment session this date with interventions consisting of gait training w/ emphasis on improving pt's ability to ambulate level surfaces x 50' x2 with min A provided by therapist with RW and Therapeutic exercise consisting of: AROM 10 reps B LE in sitting position  Pt agreeable to PT treatment session upon arrival, pt found seated OOB in recliner, in no apparent distress and responsive  In comparison to previous session, pt with improvements in household gait distance trials, no overt LOB, min A for all phases of mobility, initiation of seated ther ex in pain free range to tolerance  Post session: pt returned back to recliner, chair alarm engaged, all needs in reach and RN notified of session findings/recommendations  Continue to recommend STR at time of d/c in order to maximize pt's functional independence and safety w/ mobility  Pt continues to be functioning below baseline level, and remains limited 2* factors listed above  PT will continue to see pt while here in order to address the deficits listed above and provide interventions consistent w/ POC in effort to achieve STGs  Barriers to Discharge Inaccessible home environment;Decreased caregiver support   Goals   Patient Goals to get stronger to return home to wife   STG Expiration Date 01/26/19   Short Term Goal #1 STGs remain appropriate   Treatment Day 1   Plan   Treatment/Interventions Functional transfer training;LE strengthening/ROM; Therapeutic exercise; Endurance training;Patient/family training;Equipment eval/education; Bed mobility;Gait training;Spoke to MD;Spoke to nursing   Progress Progressing toward goals   PT Frequency 5x/wk   Recommendation   Recommendation Short-term skilled PT;OT consult; Rehab consult   Equipment Recommended Walker  (RW)   PT - OK to Discharge Yes  (when medically cleared if to STR)       Sanchez Franco PT, DPT    Time of PT treatment session: [id number]

## 2019-01-17 NOTE — PROGRESS NOTES
Lunch was ordered by speech, several phone calls made to dietary by myself, PCA, and charge nurse  Lunch did not come until 1815  Apologized several times to pt, explained situation, he was very understanding, gave him jello before his meal arrived as her requested  He ate 90% of his lunch

## 2019-01-17 NOTE — TELEPHONE ENCOUNTER
Appears pt was hospitalized and seen by neurology  Note reviewed  Will be seen in follow up  I believe he has requested to transfer to Dr Natalia Stapleton to see him in [facility], closer to home

## 2019-01-17 NOTE — SOCIAL WORK
LOS 2  No 30 day readmit  CM originally met with patient and his wife-Kandi in patient's room  Patient is alert however he is hard to understand  Patient and wife requested this CM speak to Dtr-in-law-Vivek  CM met with Faith Nowak privately  Faith Nowak states patient lives with his wife-Kandi in a one story house  There is a ramp to enter the house from outside  Patient uses a walker and a cane  Patient has hired care from [employer]  Faith Nowak is employed through them  Patient is current with [facility]  Referral is sent to [facility] Nowak is in agreement patient should go to rehab to get stronger  Faith Nowak made four choices [facility] [place]   Faith Nowak was notified transportation to [place] will be an out of pocket cost  Referrals are made awaiting responses  Patient [facility]scriptions [facility]   Faith Nowak denies mental health dx hx and substance abuse hx  Patient's poa is vivek's -[facility]  Patient is retired  Faith Nowak drives patient to all appointments  Upon clearance for discharge patient will be transported to SNF for STR  CM will follow patient's needs  nurs and SLIM notified  CM reviewed discharge planning process including the following: identifying help at home, patient preference for discharge planning needs, pharmacy preference, and availability of treatment team to discuss questions or concerns patient and/or family may have regarding understanding medications and recognizing signs and symptoms once discharged  CM also encouraged patient to follow up with all recommended appointments after discharge  Patient advised of importance for patient and family to participate in managing patients medical well being  CM name and role reviewed  Discharge Checklist reviewed and CM will continue to monitor for progress toward discharge goals in nursing and provider rounds

## 2019-01-18 ENCOUNTER — TELEPHONE (OUTPATIENT)
Dept: NEUROLOGY | Facility: CLINIC | Age: 79
End: 2019-01-18

## 2019-01-18 LAB
ANION GAP SERPL CALCULATED.3IONS-SCNC: 8 MMOL/L (ref 4–13)
BUN SERPL-MCNC: 16 MG/DL (ref 5–25)
CALCIUM SERPL-MCNC: 9.2 MG/DL (ref 8.3–10.1)
CHLORIDE SERPL-SCNC: 105 MMOL/L (ref 100–108)
CO2 SERPL-SCNC: 28 MMOL/L (ref 21–32)
CREAT SERPL-MCNC: 1.22 MG/DL (ref 0.6–1.3)
GFR SERPL CREATININE-BSD FRML MDRD: 56 ML/MIN/1.73SQ M
GLUCOSE SERPL-MCNC: 150 MG/DL (ref 65–140)
GLUCOSE SERPL-MCNC: 156 MG/DL (ref 65–140)
GLUCOSE SERPL-MCNC: 158 MG/DL (ref 65–140)
GLUCOSE SERPL-MCNC: 167 MG/DL (ref 65–140)
GLUCOSE SERPL-MCNC: 213 MG/DL (ref 65–140)
POTASSIUM SERPL-SCNC: 3.9 MMOL/L (ref 3.5–5.3)
SODIUM SERPL-SCNC: 141 MMOL/L (ref 136–145)

## 2019-01-18 PROCEDURE — 80048 BASIC METABOLIC PNL TOTAL CA: CPT | Performed by: INTERNAL MEDICINE

## 2019-01-18 PROCEDURE — 97110 THERAPEUTIC EXERCISES: CPT

## 2019-01-18 PROCEDURE — 82948 REAGENT STRIP/BLOOD GLUCOSE: CPT

## 2019-01-18 PROCEDURE — 99232 SBSQ HOSP IP/OBS MODERATE 35: CPT | Performed by: INTERNAL MEDICINE

## 2019-01-18 PROCEDURE — 97116 GAIT TRAINING THERAPY: CPT

## 2019-01-18 RX ADMIN — INSULIN GLARGINE 20 UNITS: 100 INJECTION, SOLUTION SUBCUTANEOUS at 22:56

## 2019-01-18 RX ADMIN — CARBIDOPA AND LEVODOPA 1 TABLET: 25; 100 TABLET ORAL at 20:17

## 2019-01-18 RX ADMIN — INSULIN LISPRO 2 UNITS: 100 INJECTION, SOLUTION INTRAVENOUS; SUBCUTANEOUS at 22:57

## 2019-01-18 RX ADMIN — ATORVASTATIN CALCIUM 80 MG: 40 TABLET, FILM COATED ORAL at 17:24

## 2019-01-18 RX ADMIN — ENOXAPARIN SODIUM 40 MG: 40 INJECTION SUBCUTANEOUS at 09:03

## 2019-01-18 RX ADMIN — ROPINIROLE 0.75 MG: 0.25 TABLET, FILM COATED ORAL at 09:04

## 2019-01-18 RX ADMIN — METOPROLOL TARTRATE 12.5 MG: 25 TABLET ORAL at 17:24

## 2019-01-18 RX ADMIN — INSULIN LISPRO 1 UNITS: 100 INJECTION, SOLUTION INTRAVENOUS; SUBCUTANEOUS at 09:04

## 2019-01-18 RX ADMIN — ROPINIROLE 0.75 MG: 0.25 TABLET, FILM COATED ORAL at 20:16

## 2019-01-18 RX ADMIN — CARBIDOPA AND LEVODOPA 1 TABLET: 25; 100 TABLET ORAL at 17:24

## 2019-01-18 RX ADMIN — ASPIRIN 81 MG 81 MG: 81 TABLET ORAL at 17:32

## 2019-01-18 RX ADMIN — INSULIN LISPRO 1 UNITS: 100 INJECTION, SOLUTION INTRAVENOUS; SUBCUTANEOUS at 17:29

## 2019-01-18 RX ADMIN — CARBIDOPA AND LEVODOPA 1 TABLET: 25; 100 TABLET ORAL at 09:03

## 2019-01-18 RX ADMIN — ROPINIROLE 0.75 MG: 0.25 TABLET, FILM COATED ORAL at 17:24

## 2019-01-18 RX ADMIN — METOPROLOL TARTRATE 12.5 MG: 25 TABLET ORAL at 09:03

## 2019-01-18 RX ADMIN — CARBIDOPA AND LEVODOPA 1 TABLET: 50; 200 TABLET, EXTENDED RELEASE ORAL at 22:57

## 2019-01-18 RX ADMIN — INSULIN LISPRO 1 UNITS: 100 INJECTION, SOLUTION INTRAVENOUS; SUBCUTANEOUS at 12:00

## 2019-01-18 NOTE — SOCIAL WORK
[facility], she provided two additional facilities, [facility] and [facility], referrals sent for review

## 2019-01-18 NOTE — SOCIAL WORK
Deysi Coats reports family would like [facility] facility  Ml Sanchez is going for insurance auth

## 2019-01-18 NOTE — ASSESSMENT & PLAN NOTE
Patient remains with DBS which will need to be charged every other night  Nursing aware of the need to charge the unit everyother    · Continue sinemet, ropinerole  · Patient being evaluated for [facility] vs SNF

## 2019-01-18 NOTE — ASSESSMENT & PLAN NOTE
Lab Results   Component Value Date    HGBA1C 6 9 (H) 09/19/2015       Recent Labs      01/17/19   0611  01/17/19   1241  01/17/19   1547  01/17/19 2054   POCGLU  118  194*  187*  226*       Blood Sugar Average: Last 72 hrs:  (P) 169 9 Holding home metformin given AIDA  Cont home lantus 20U qhs   SSI ordered q 6hrs

## 2019-01-18 NOTE — SOCIAL WORK
Follow up call placed to [facility], cm informed no beds available, patient denied at [facility], [facility] is not par with patient insurance, [facility] at [facility] able to accommodate at another facilty  Cm will follow up with patient family regarding this matter

## 2019-01-18 NOTE — SOCIAL WORK
Cm contacted family regarding additional choices, cm informed family will review the list and follow up with cm shortly

## 2019-01-18 NOTE — PROGRESS NOTES
Progress Note - Kimmie Arias  1940, [de-identified] y o  male MRN: [id number]    Unit/Bed#: -[facility] Encounter: [id number]    Primary Care Provider: Paulo Galaviz MD   Date and time admitted to hospital: 1/15/2019  6:35 PM        * Ambulatory dysfunction   Assessment & Plan    Patient reported to have become progressively weak since his last hospitalization in December  He lives at home and has PT/OT who comes 2 times per week  His daughter in law states that over the past 2 days, he was unable to get out of his lift chair which is new for him  She notes that since his discharge in December, he has been sleeping more than usual   She is interested in having him evaluated by PT/OT for subacute rehab   - PT/OT ordered    -patient has Parkinson's disease and has a deep brain stimulator  In addition to weakness, it was noted that his tongue was having worsening protrusion concerning for possible CVA presentation  Neurology does not feel this represents a stroke recommending no change to parkinson's med continue asa and Lipitor  Parkinson's disease with use of electrical brain stimulation [facility])   Assessment & Plan    Patient remains with DBS which will need to be charged every other night  Nursing aware of the need to charge the unit everyother  · Continue sinemet, ropinerole  · Patient being evaluated for [facility] vs SN[facility]     Type 2 diabetes mellitus [facility])   Assessment & Plan    Lab Results   Component Value Date    HGBA1C 6 9 (H) 09/19/2015       Recent Labs      01/17/19   0611  01/17/19   1241  01/17/19   1547  01/17/19   2054   POCGLU  118  194*  187*  226*       Blood Sugar Average: Last 72 hrs:  (P) 169 9 Holding home metformin given AIDA  Cont home lantus 20U qhs  SSI ordered q 6hrs     HTN (hypertension)   Assessment & Plan    Cont home BB  ACE and HcTZ held   AIDA improved  Reval for restart prior to discharge           VTE Pharmacologic Prophylaxis:   Pharmacologic: Enoxaparin (Lovenox)  Mechanical: Mechanical VTE prophylaxis in place  Patient Centered Rounds: Updated on walking rounds  Discussions with Specialists or Other Care Team Provider:  None  Education and Discussions with Family / Patient:  Updated daughter and wife at bedside  Time Spent for Care: 30 minutes  If More than 50% of total time spent on counseling and coordination of care as described above indicate yes or no and described the counseling and coordination:  Discussed discharge plan with daughter [facility]    Current Length of Stay: 2 day(s)  Current Patient Status: Inpatient   Certification Statement: The patient will continue to require additional inpatient hospital stay due to Awaiting sniff placement versus acute rehab    Discharge Plan:  Next 24-48 hours  Code Status: Level 3 - DNAR and DNI    Subjective:   Patient is slow to respond but able to do so  Appears to understand the measures that hand  Is agreeable to acute rehab versus sniff placement  Patient denies any pain    Objective:   Vitals:   Temp (24hrs), Av 7 °F (36 5 °C), Min:97 3 °F (36 3 °C), Max:98 3 °F (36 8 °C)    Temp:  [97 3 °F (36 3 °C)-98 3 °F (36 8 °C)] 97 5 °F (36 4 °C)  HR:  [61-74] 64  Resp:  [18] 18  BP: (116-136)/(70-75) 116/70  SpO2:  [95 %-100 %] 100 %  Body mass index is 26 38 kg/m²  Input and Output Summary (last 24 hours):        Intake/Output Summary (Last 24 hours) at 19 0097  Last data filed at 19 1756   Gross per 24 hour   Intake              120 ml   Output              600 ml   Net             -480 ml       Physical Exam:  General well-developed obese male no acute distress normocephalic atraumatic pupils equal round and reactive to light extraocular muscles intact mucous membranes are moist neck is supple there is no JVD no lymph nodes no carotid bruits chest is decreased with poor air entry there is no rhonchi rales or wheezes  Cardiovascular regular rate rhythm positive S1 and S2 no S3-S4 murmur or gallop  Abdomen obese soft nontender nondistended with positive bowel sounds no hepatosplenomegaly no guarding or rebound  Neurologically positive resting tremor positive drooling patient appears to have abilities to receive and express but is slow with flat affect    Physical Exam    Additional Data:   Labs:    Results from last 7 days  Lab Units 01/15/19  1858   WBC Thousand/uL 5 72   HEMOGLOBIN g/dL 12 8   HEMATOCRIT % 39 1   PLATELETS Thousands/uL 213   NEUTROS PCT % 50   LYMPHS PCT % 36   MONOS PCT % 11   EOS PCT % 2       Results from last 7 days  Lab Units 01/16/19  0535 01/15/19  1858   POTASSIUM mmol/L 3 7 3 9   CHLORIDE mmol/L 107 101   CO2 mmol/L 27 27   BUN mg/dL 20 26*   CREATININE mg/dL 1 09 1 54*   CALCIUM mg/dL 8 9 9 4   ALK PHOS U/L  --  101   ALT U/L  --  11*   AST U/L  --  19           * I Have Reviewed All Lab Data Listed Above  * Additional Pertinent Lab Tests Reviewed:  All Labs Within Last 24 Hours Reviewed    Imaging:    Imaging Reports Reviewed Today Include:  None    Cultures:   Blood Culture: No results found for: BLOODCX  Urine Culture:   Lab Results   Component Value Date    URINECX <10,000 cfu/ml  12/04/2018     Sputum Culture: No components found for: SPUTUMCX  Wound Culture: No results found for: WOUNDCULT    Last 24 Hours Medication List:     Current Facility-Administered Medications:  aspirin 81 mg Oral QPM Makenna Morales MD   atorvastatin 80 mg Oral QPM Makenna Morales MD   carbidopa-levodopa 1 tablet Oral HS Makenna Morales MD   carbidopa-levodopa 1 tablet Oral TID Makenna Morales MD   enoxaparin 40 mg Subcutaneous Daily Samantha Mi MD   insulin glargine 20 Units Subcutaneous HS Makenna Morales MD   insulin lispro 1-6 Units Subcutaneous 4x Daily (AC & HS) Henrene Libman, MD   metoprolol tartrate 12 5 mg Oral BID Makenna Morales MD   rOPINIRole 0 75 mg Oral TID Samantha Mi MD        Today, Patient Was Seen By: Reshma Alanis MD    ** Please Note: Dragon 360 Dictation voice to text software may have been used in the creation of this document   **

## 2019-01-18 NOTE — PHYSICAL THERAPY NOTE
Physical Therapy Treatment Note       01/18/19 [facility]   Pain Assessment   Pain Assessment 0-10   Pain Score No Pain   Restrictions/Precautions   Weight Bearing Precautions Per Order No   Braces or Orthoses (none pe rpt)   Other Precautions Chair Alarm; Bed Alarm; Fall Risk   General   Chart Reviewed Yes   Response to Previous Treatment Patient with no complaints from previous session  Family/Caregiver Present Yes   Cognition   Overall Cognitive Status WFL   Arousal/Participation Alert; Cooperative   Attention Within functional limits   Orientation Level Oriented X4   Memory Within functional limits   Following Commands Follows all commands and directions without difficulty   Comments pt agreeable to PT session   Subjective   Subjective "I want to walk"   Bed Mobility   Additional Comments pt received OOB in recliner upon arrival   Transfers   Sit to Stand 4  Minimal assistance   Additional items Assist x 1; Armrests; Increased time required;Verbal cues   Stand to Sit 4  Minimal assistance   Additional items Assist x 1; Armrests; Increased time required;Verbal cues   Ambulation/Elevation   Gait pattern Improper Weight shift; Shuffling; Festenating; Inconsistent son; Short stride; Step to;Excessively slow; Foward flexed   Gait Assistance 4  Minimal assist   Additional items Assist x 1;Verbal cues  (for RW management, sequencing, posture)   Assistive Device Rolling walker   Distance 76' x2, 30' c close chair follow   Balance   Static Sitting Good   Dynamic Sitting Fair +   Static Standing Fair   Dynamic Standing Fair -   Ambulatory Fair -   Endurance Deficit   Endurance Deficit Yes   Activity Tolerance   Activity Tolerance Patient limited by fatigue   Nurse Made Aware ELISABETH Chapin verbalized pt appropriate to see, made aware of session outcome/recs   Exercises   Heelslides Sitting;10 reps;AROM; Bilateral   Hip Abduction Sitting;10 reps;AROM; Bilateral   Hip Adduction Sitting;10 reps;AROM; Bilateral   Knee AROM Short Arc [employer] Sitting;10 reps;AROM; Bilateral   Knee AROM Long Arc Quad Sitting;20 reps;AROM; Bilateral   Ankle Pumps Sitting;20 reps;AROM; Bilateral   Heel Cord Stretch (heel raises 2x 10 reps)   Marching Sitting;10 reps;AROM; Bilateral   Assessment   Prognosis Good   Problem List Decreased strength;Decreased endurance; Impaired balance;Decreased mobility; Decreased coordination   Assessment Pt seen for PT treatment session this date with interventions consisting of gait training w/ emphasis on improving pt's ability to ambulate level surfaces with min A provided by therapist with RW and Therapeutic exercise consisting of: AROM 1-2 sets of 10 reps B LE in sitting position  Pt agreeable to PT treatment session upon arrival, pt found seated OOB in recliner, in no apparent distress and responsive  In comparison to previous session, pt with improvements in household gait distance trials, no overt LOB, min A for all phases of mobility, continuation of seated ther ex in pain free range to tolerance for increased rep performance, improved standing and ambulatory balance grades by 1/2 grade  Post session: pt returned back to recliner, chair alarm engaged, all needs in reach and RN notified of session findings/recommendations  Continue to recommend STR at time of d/c in order to maximize pt's functional independence and safety w/ mobility  Pt continues to be functioning below baseline level, and remains limited 2* factors listed above  PT will continue to see pt while here in order to address the deficits listed above and provide interventions consistent w/ POC in effort to achieve STGs  Barriers to Discharge Inaccessible home environment;Decreased caregiver support   Goals   Patient Goals to go to rehab and walk more   STG Expiration Date 01/26/19   Short Term Goal #1 STGs remain appropriate   Treatment Day 2   Plan   Treatment/Interventions Functional transfer training;LE strengthening/ROM; Therapeutic exercise; Endurance training;Patient/family training;Equipment eval/education; Bed mobility;Gait training;Spoke to nursing;Spoke to case management; Family   Progress Progressing toward goals   PT Frequency 5x/wk   Recommendation   Recommendation Short-term skilled PT;OT consult   Equipment Recommended Walker  (RW)   PT - OK to Discharge Yes  (when medically cleared if to STR)       Helen Foster, PT, DPT    Time of PT treatment session: 7195-9630

## 2019-01-18 NOTE — PLAN OF CARE
Problem: DISCHARGE PLANNING - CARE MANAGEMENT  Goal: Discharge to post-acute care or home with appropriate resources  INTERVENTIONS:  - Conduct assessment to determine patient/family and health care team treatment goals, and need for post-acute services based on payer coverage, community resources, and patient preferences, and barriers to discharge  - Address psychosocial, clinical, and financial barriers to discharge as identified in assessment in conjunction with the patient/family and health care team  - Arrange appropriate level of post-acute services according to patient's   needs and preference and payer coverage in collaboration with the physician and health care team  - Communicate with and update the patient/family, physician, and health care team regarding progress on the discharge plan  - Arrange appropriate transportation to post-acute venues  Outcome: Progressing  LOS 2  No 30 day readmit  CM originally met with patient and his wife-Kandi in patient's room  Patient is alert however he is hard to understand  Patient and wife requested this CM speak to Dtr-in-law-Vivek  CM met with [place] privately  Samuel De La Fuente states patient lives with his wife-Kandi in a one story house  There is a ramp to enter the house from outside  Patient uses a walker and a cane  Patient has hired care from [employer]  Samuel De La Fuente is employed through them  Patient is current with [facility]  Referral is sent to [facility]  Samuel De La Fuente is in agreement patient should go to rehab to get stronger  Samuel De La Fuente made four choices [facility]st [facility] [place]   Samuel De La Fuente was notified transportation to [place] will be an out of pocket cost  Referrals are made awaiting responses  [facility]   Samuel De La Fuente denies mental health dx hx and substance abuse hx  Patient's poa is vivek's -[facility]  Patient is retired  Samuel De La Fuente drives patient to all appointments  Upon clearance for discharge patient will be transported to SNF for STR   CM will follow patient's needs  nurs and SLIM notified

## 2019-01-18 NOTE — PLAN OF CARE
Problem: PHYSICAL THERAPY ADULT  Goal: Performs mobility at highest level of function for planned discharge setting  See evaluation for individualized goals  Treatment/Interventions: Functional transfer training, LE strengthening/ROM, Therapeutic exercise, Endurance training, Patient/family training, Equipment eval/education, Bed mobility, Gait training, Spoke to MD, Spoke to nursing  Equipment Recommended: Ino Sanchez (continue with RW)       See flowsheet documentation for full assessment, interventions and recommendations  Outcome: Progressing  Prognosis: Good  Problem List: Decreased strength, Decreased endurance, Impaired balance, Decreased mobility, Decreased coordination  Assessment: Pt seen for PT treatment session this date with interventions consisting of gait training w/ emphasis on improving pt's ability to ambulate level surfaces with min A provided by therapist with RW and Therapeutic exercise consisting of: AROM 1-2 sets of 10 reps B LE in sitting position  Pt agreeable to PT treatment session upon arrival, pt found seated OOB in recliner, in no apparent distress and responsive  In comparison to previous session, pt with improvements in household gait distance trials, no overt LOB, min A for all phases of mobility, continuation of seated ther ex in pain free range to tolerance for increased rep performance, improved standing and ambulatory balance grades by 1/2 grade  Post session: pt returned back to recliner, chair alarm engaged, all needs in reach and RN notified of session findings/recommendations  Continue to recommend STR at time of d/c in order to maximize pt's functional independence and safety w/ mobility  Pt continues to be functioning below baseline level, and remains limited 2* factors listed above  PT will continue to see pt while here in order to address the deficits listed above and provide interventions consistent w/ POC in effort to achieve STGs    Barriers to Discharge: Inaccessible home environment, Decreased caregiver support     Recommendation: Short-term skilled PT, OT consult     PT - OK to Discharge: Yes (when medically cleared if to STR)    See flowsheet documentation for full assessment

## 2019-01-18 NOTE — ASSESSMENT & PLAN NOTE
Patient reported to have become progressively weak since his last hospitalization in December  He lives at home and has PT/OT who comes 2 times per week  His daughter in law states that over the past 2 days, he was unable to get out of his lift chair which is new for him  She notes that since his discharge in December, he has been sleeping more than usual   She is interested in having him evaluated by PT/OT for subacute rehab   - PT/OT ordered    -patient has Parkinson's disease and has a deep brain stimulator  In addition to weakness, it was noted that his tongue was having worsening protrusion concerning for possible CVA presentation  Neurology does not feel this represents a stroke recommending no change to parkinson's med continue asa and Lipitor

## 2019-01-19 VITALS
BODY MASS INDEX: 26.48 KG/M2 | SYSTOLIC BLOOD PRESSURE: 112 MMHG | HEART RATE: 67 BPM | TEMPERATURE: 97.6 F | WEIGHT: 189.15 LBS | HEIGHT: 71 IN | OXYGEN SATURATION: 95 % | DIASTOLIC BLOOD PRESSURE: 58 MMHG | RESPIRATION RATE: 18 BRPM

## 2019-01-19 PROBLEM — G24.9 NEUROLOGICAL MOVEMENT DISORDER: Status: ACTIVE | Noted: 2019-01-19

## 2019-01-19 LAB
ANION GAP SERPL CALCULATED.3IONS-SCNC: 8 MMOL/L (ref 4–13)
BUN SERPL-MCNC: 16 MG/DL (ref 5–25)
CALCIUM SERPL-MCNC: 9.3 MG/DL (ref 8.3–10.1)
CHLORIDE SERPL-SCNC: 103 MMOL/L (ref 100–108)
CO2 SERPL-SCNC: 29 MMOL/L (ref 21–32)
CREAT SERPL-MCNC: 1.27 MG/DL (ref 0.6–1.3)
GFR SERPL CREATININE-BSD FRML MDRD: 53 ML/MIN/1.73SQ M
GLUCOSE SERPL-MCNC: 149 MG/DL (ref 65–140)
GLUCOSE SERPL-MCNC: 192 MG/DL (ref 65–140)
GLUCOSE SERPL-MCNC: 223 MG/DL (ref 65–140)
POTASSIUM SERPL-SCNC: 4 MMOL/L (ref 3.5–5.3)
SODIUM SERPL-SCNC: 140 MMOL/L (ref 136–145)

## 2019-01-19 PROCEDURE — 80048 BASIC METABOLIC PNL TOTAL CA: CPT | Performed by: INTERNAL MEDICINE

## 2019-01-19 PROCEDURE — 99238 HOSP IP/OBS DSCHRG MGMT 30/<: CPT | Performed by: INTERNAL MEDICINE

## 2019-01-19 PROCEDURE — 82948 REAGENT STRIP/BLOOD GLUCOSE: CPT

## 2019-01-19 RX ADMIN — ENOXAPARIN SODIUM 40 MG: 40 INJECTION SUBCUTANEOUS at 09:29

## 2019-01-19 RX ADMIN — INSULIN LISPRO 2 UNITS: 100 INJECTION, SOLUTION INTRAVENOUS; SUBCUTANEOUS at 12:00

## 2019-01-19 RX ADMIN — CARBIDOPA AND LEVODOPA 1 TABLET: 25; 100 TABLET ORAL at 09:29

## 2019-01-19 RX ADMIN — METOPROLOL TARTRATE 12.5 MG: 25 TABLET ORAL at 09:29

## 2019-01-19 RX ADMIN — ROPINIROLE 0.75 MG: 0.25 TABLET, FILM COATED ORAL at 09:29

## 2019-01-19 NOTE — ASSESSMENT & PLAN NOTE
Patient reported to have become progressively weak since his last hospitalization in December  He lives at home and has PT/OT who comes 2 times per week  His daughter in law states that over the past 2 days, he was unable to get out of his lift chair which is new for him  She notes that since his discharge in December, he has been sleeping more than usual   She is interested in having him evaluated by PT/OT for subacute rehab   - PT/OT ordered    -patient has Parkinson's disease and has a deep brain stimulator  In addition to weakness, it was noted that his tongue was having worsening protrusion concerning for possible CVA presentation  Neurology does not feel this represents a stroke recommending no change to parkinson's med continue asa and Lipitor  Patient improved today with better speech and cognition  Plan for SNF placment when available

## 2019-01-19 NOTE — PLAN OF CARE
Problem: DISCHARGE PLANNING - CARE MANAGEMENT  Goal: Discharge to post-acute care or home with appropriate resources  INTERVENTIONS:  - Conduct assessment to determine patient/family and health care team treatment goals, and need for post-acute services based on payer coverage, community resources, and patient preferences, and barriers to discharge  - Address psychosocial, clinical, and financial barriers to discharge as identified in assessment in conjunction with the patient/family and health care team  - Arrange appropriate level of post-acute services according to patient's   needs and preference and payer coverage in collaboration with the physician and health care team  - Communicate with and update the patient/family, physician, and health care team regarding progress on the discharge plan  - Arrange appropriate transportation to post-acute venues   Outcome: Completed Date Met: 01/19/19  Received a vm from Azar Held with [facility] providing Ashlee Melchor #[id number]  [address] [place] [address] has insurance auth number as well  [facility] will pick pt up for transport at 1300  [employer] transport [address] [place] [address] #[phone]  Written on medical necessity  CM contacted Deysi Coats at [phone] to inform her that Ashlee Melchor has been obtained and transport time  She stated that she will come to hospital at 12:30pm  All aware

## 2019-01-19 NOTE — ASSESSMENT & PLAN NOTE
Lab Results   Component Value Date    HGBA1C 6 9 (H) 09/19/2015       Recent Labs      01/18/19   0650  01/18/19   1121  01/18/19   1710  01/18/19   2102   POCGLU  156*  150*  167*  213*       Blood Sugar Average: Last 72 hrs:  (P) 170 [id number] Holding home metformin given AIDA  Cont home lantus 20U qhs   SSI ordered q 6hrs

## 2019-01-19 NOTE — ASSESSMENT & PLAN NOTE
Patient remains with DBS which will need to be charged every other night  Nursing aware of the need to charge the unit everyother  · Continue sinemet, ropinerole  · Patient to transfer to SNF when bed available

## 2019-01-19 NOTE — PROGRESS NOTES
Progress Note - Swathi Lua  1940, [de-identified] y o  male MRN: [id number]    Unit/Bed#: -01 Encounter: [id number]    Primary Care Provider: Marleni Dyer MD   Date and time admitted to hospital: 1/15/2019  6:35 PM        Neurological movement disorder   Assessment & Plan    May be multifactorial including deconditioning, dehydration, exacerbation of parkinson's disorder  * Ambulatory dysfunction   Assessment & Plan    Patient reported to have become progressively weak since his last hospitalization in December  He lives at home and has PT/OT who comes 2 times per week  His daughter in law states that over the past 2 days, he was unable to get out of his lift chair which is new for him  She notes that since his discharge in December, he has been sleeping more than usual   She is interested in having him evaluated by PT/OT for subacute rehab   - PT/OT ordered    -patient has Parkinson's disease and has a deep brain stimulator  In addition to weakness, it was noted that his tongue was having worsening protrusion concerning for possible CVA presentation  Neurology does not feel this represents a stroke recommending no change to parkinson's med continue asa and Lipitor  Patient improved today with better speech and cognition  Plan for SNF placment when available  Parkinson's disease with use of electrical brain stimulation [facility])   Assessment & Plan    Patient remains with DBS which will need to be charged every other night  Nursing aware of the need to charge the unit everyother  · Continue sinemet, ropinerole  · Patient to transfer to SNF when bed available  Type 2 diabetes mellitus [facility])   Assessment & Plan    Lab Results   Component Value Date    HGBA1C 6 9 (H) 09/19/2015       Recent Labs      01/18/19   0650  01/18/19   1121  01/18/19   1710  01/18/19   2102   POCGLU  156*  150*  167*  213*       Blood Sugar Average: Last 72 hrs:  (P) 170 [id number] Holding home metformin given AIDA  Cont home lantus 20U qhs  SSI ordered q 6hrs     HTN (hypertension)   Assessment & Plan    Cont home BB  ACE and HcTZ held   AIDA improved  Reval for restart prior to discharge  VTE Pharmacologic Prophylaxis:   Pharmacologic: Enoxaparin (Lovenox)  Mechanical: Mechanical VTE prophylaxis in place  Patient Centered Rounds: updated nursing on working rounds  Discussions with Specialists or Other Care Team Provider: care managment  Education and Discussions with Family / Patient: will updated daughter in law and spouse  Time Spent for Care: 20 minutes  If More than 50% of total time spent on counseling and coordination of care as described above indicate yes or no and described the counseling and coordination: Good marija not an option looking for SNF near family  Current Length of Stay: 3 day(s)  Current Patient Status: Inpatient   Certification Statement: The patient will continue to require additional inpatient hospital stay due to SNF placement    Discharge Plan: hopeful for next 24 hours  Code Status: Level 3 - DNAR and DNI    Subjective:   Patient more awake alert and interactive  Contributing to conversation  Objective:   Vitals:   Temp (24hrs), Av 4 °F (36 9 °C), Min:98 1 °F (36 7 °C), Max:98 5 °F (36 9 °C)    Temp:  [98 1 °F (36 7 °C)-98 5 °F (36 9 °C)] 98 5 °F (36 9 °C)  HR:  [61-71] 67  Resp:  [16-18] 18  BP: ()/(54-69) 96/54  SpO2:  [92 %-97 %] 92 %  Body mass index is 26 38 kg/m²  Input and Output Summary (last 24 hours): Intake/Output Summary (Last 24 hours) at 19 0410  Last data filed at 19 1300   Gross per 24 hour   Intake              740 ml   Output              840 ml   Net             -100 ml       Physical Exam:     Physical Exam   Constitutional: He is oriented to person, place, and time  He appears well-developed and well-nourished  No distress  HENT:   Head: Normocephalic and atraumatic     Right Ear: External ear normal    Left Ear: External ear normal    Nose: Nose normal    Mouth/Throat: Oropharynx is clear and moist  No oropharyngeal exudate  Drooling slightly less   Eyes: Pupils are equal, round, and reactive to light  Conjunctivae and EOM are normal  Right eye exhibits no discharge  Left eye exhibits no discharge  No scleral icterus  Neck: Normal range of motion  Neck supple  No JVD present  No thyromegaly present  Cardiovascular: Normal rate, regular rhythm, normal heart sounds and intact distal pulses  Exam reveals no gallop and no friction rub  No murmur heard  Pulmonary/Chest: Effort normal and breath sounds normal  No respiratory distress  He has no wheezes  He has no rales  Abdominal: Soft  Bowel sounds are normal  He exhibits no distension  There is no tenderness  There is no rebound and no guarding  Musculoskeletal: Normal range of motion  He exhibits no edema or deformity  Lymphadenopathy:     He has no cervical adenopathy  Neurological: He is alert and oriented to person, place, and time  He has normal reflexes  No cranial nerve deficit  He exhibits normal muscle tone  positve bradykinesia  Masked facies, cogwheeling  Resting tremor  Skin: Skin is warm and dry  No rash noted  He is not diaphoretic  No erythema  Psychiatric: He has a normal mood and affect  Vitals reviewed  Additional Data:   Labs:    Results from last 7 days  Lab Units 01/15/19  1858   WBC Thousand/uL 5 72   HEMOGLOBIN g/dL 12 8   HEMATOCRIT % 39 1   PLATELETS Thousands/uL 213   NEUTROS PCT % 50   LYMPHS PCT % 36   MONOS PCT % 11   EOS PCT % 2       Results from last 7 days  Lab Units 01/18/19  0649  01/15/19  1858   POTASSIUM mmol/L 3 9  < > 3 9   CHLORIDE mmol/L 105  < > 101   CO2 mmol/L 28  < > 27   BUN mg/dL 16  < > 26*   CREATININE mg/dL 1 22  < > 1 54*   CALCIUM mg/dL 9 2  < > 9 4   ALK PHOS U/L  --   --  101   ALT U/L  --   --  11*   AST U/L  --   --  19   < > = values in this interval not displayed          * I Have Reviewed All Lab Data Listed Above  * Additional Pertinent Lab Tests Reviewed: No New Labs Available For Today    Imaging:    Imaging Reports Reviewed Today Include: none    Cultures:   Blood Culture: No results found for: BLOODCX  Urine Culture:   Lab Results   Component Value Date    URINECX <10,000 cfu/ml  12/04/2018     Sputum Culture: No components found for: SPUTUMCX  Wound Culture: No results found for: WOUNDCULT    Last 24 Hours Medication List:     Current Facility-Administered Medications:  aspirin 81 mg Oral QPM Makenna Morales MD   atorvastatin 80 mg Oral QPM Makenna Morales MD   carbidopa-levodopa 1 tablet Oral HS Makenna Morales MD   carbidopa-levodopa 1 tablet Oral TID Makenna Morales MD   enoxaparin 40 mg Subcutaneous Daily Beata Sarah MD   insulin glargine 20 Units Subcutaneous HS Makenna Morales MD   insulin lispro 1-6 Units Subcutaneous 4x Daily (AC & HS) Eldridge Olszewski, MD   metoprolol tartrate 12 5 mg Oral BID Makenna Morales MD   rOPINIRole 0 75 mg Oral TID Beata Sraah MD        Today, Patient Was Seen By: Eldridge Olszewski, MD    ** Please Note: Dragon 360 Dictation voice to text software may have been used in the creation of this document   **

## 2019-01-19 NOTE — SOCIAL WORK
Physician signed out of hospital DNR form placed in chart  Needs pt or rep signature  Per RN, EMS asked for it but left without it

## 2019-01-20 NOTE — DISCHARGE SUMMARY
Discharge Summary - [facility]    Patient Information: Johan Rouse  [de-identified] y o  male MRN: [id number]  Unit/Bed#: -01 Encounter: [id number]    Discharging Physician / Practitioner: Erick Roberson MD  PCP: Selma Early MD  Admission Date: 1/15/2019  Discharge Date: 01/19/19    Reason for Admission:  Generalized weakness    Discharge Diagnoses:     Principal Problem:  ·   Ambulatory dysfunction:  Patient with advanced Parkinson's disease status post deep brain stimulator  Presents with generalized weakness which may be secondary to dehydration  Agreeable to inpatient skilled nursing for rehabilitation  Active Problems:  ·   Neurological movement disorder:  Patient was seen and evaluated by Neurology and did not feel that he had had a stroke nor did they feel that his Parkinson's medicines needs to be changed  Patient's is conditions improved with slight rehydration  No adjustments were made to the patient's Parkinson's disease and continue with his deep brain stimulator which needs to be reach arched every other day  ·   Parkinson's disease with use of electrical brain stimulation [facility]):  As noted no adjustments made to his medications  Continue deep brain stimulation  ·   Type 2 diabetes mellitus ([facility] [address] ):  Metformin had been on hold due to acute kidney injury this resolved and therefore was resumed at discharge  He can continue on his Lantus 20 units q h s  And sliding scale insulin can be used if the metformin is not controlling his blood glucoses  ·   HTN (hypertension):  Continue beta-blocker  ACE-inhibitor and hydrochlorothiazide were held because of acute kidney injury  This route resolved and therefore we elected to resume these at discharge  · Hyperlipidemia with CAD continue aspirin and statin  Resolved Problems:    * No resolved hospital problems   *      Consultations During Hospital Stay:  · Neurology    Procedures Performed:     · CT head:  No inter cranial abnormality    Significant Findings / Test Results:     · As above  · Creatinine at discharge 1 27  · Potassium 4 0  · Sodium 140  ·     Incidental Findings:   · None    Test Results Pending at Discharge (will require follow up): · None     Outpatient Tests Requested:  · None    Complications:  None    Hospital Course:     Shante Smyth  is a [de-identified] y o  male patient who originally presented to the hospital on 1/15/2019 due to generalized weakness  Patient was found to have an acute kidney injury with slight dehydration  He is seen by Neurology and deemed reasonably stable from a neurological standpoint no adjustments were made from his Parkinson's medications  After gentle rehydration the patient's creatinine improved and the patient's symptoms resolved  Urinalysis  Negative and there was no other signs for infection  Patient therefore was transferred to skilled nursing facility in maximize his mobility as he desires to live at home with his spouse who is also debilitated  At the time of discharge patient had less drooling and was sitting in the chair able to feed himself    Condition at Discharge: good     Discharge Day Visit / Exam:     Subjective:  Patient doing well speech improved  Vitals: Blood Pressure: 112/58 (01/19/19 0900)  Pulse: 67 (01/19/19 0900)  Temperature: 97 6 °F (36 4 °C) (01/19/19 0900)  Temp Source: Oral (01/19/19 0900)  Respirations: 18 (01/19/19 0900)  Height: 5' 11" (180 3 cm) (01/15/19 2241)  Weight - Scale: 85 8 kg (189 lb 2 5 oz) (01/16/19 1217)  SpO2: 95 % (01/19/19 0900)  Exam:   Physical Exam    General well-developed obese male no acute distress normocephalic atraumatic pupils equal round and reactive to light extraocular muscles appear imtact comma his tongue is slightly protruded as it does chronically     Chest is clear to auscultation no rhonchi rales or wheezes  Cardiovascular regular rate rhythm positive S1 and S2 no S3-S4 murmur or gallop  Abdomen is soft nontender nondistended with positive bowel sounds no hepatosplenomegaly no guarding or rebound  Neurologically patient awake alert oriented cranial nerves 2-12 appear to be the intact he does have stigmata of Parkinson's disease including bradykinesia and cogwheeling    Discharge instructions/Information to patient and family:   See after visit summary for information provided to patient and family  Provisions for Follow-Up Care:  See after visit summary for information related to follow-up care and any pertinent home health orders  Disposition:     Other [facility] to [employer] SNF:   · Not Applicable to this Patient - Not Applicable to this Patient    Planned Readmission:  None     Discharge Statement:  I spent 25 minutes discharging the patient  This time was spent on the day of discharge  I had direct contact with the patient on the day of discharge  Greater than 50% of the total time was spent examining patient, answering all patient questions, arranging and discussing plan of care with patient as well as directly providing post-discharge instructions  Additional time then spent on discharge activities  Discharge Medications:  See after visit summary for reconciled discharge medications provided to patient and family        ** Please Note: This note has been constructed using a voice recognition system **

## 2019-02-16 ENCOUNTER — APPOINTMENT (INPATIENT)
Dept: CT IMAGING | Facility: HOSPITAL | Age: 79
DRG: 871 | End: 2019-02-16
Payer: COMMERCIAL

## 2019-02-16 ENCOUNTER — HOSPITAL ENCOUNTER (INPATIENT)
Facility: HOSPITAL | Age: 79
LOS: 10 days | Discharge: NURSING, REHAB OR LONG-TERM CARE | DRG: 871 | End: 2019-02-26
Attending: EMERGENCY MEDICINE | Admitting: INTERNAL MEDICINE
Payer: COMMERCIAL

## 2019-02-16 ENCOUNTER — APPOINTMENT (EMERGENCY)
Dept: RADIOLOGY | Facility: HOSPITAL | Age: 79
DRG: 871 | End: 2019-02-16
Payer: COMMERCIAL

## 2019-02-16 DIAGNOSIS — R65.10 SIRS (SYSTEMIC INFLAMMATORY RESPONSE SYNDROME) (HCC): Primary | ICD-10-CM

## 2019-02-16 DIAGNOSIS — R78.81 BACTEREMIA: ICD-10-CM

## 2019-02-16 DIAGNOSIS — N17.9 AKI (ACUTE KIDNEY INJURY) (HCC): ICD-10-CM

## 2019-02-16 DIAGNOSIS — G20 PARKINSON'S DISEASE WITH USE OF ELECTRICAL BRAIN STIMULATION (HCC): ICD-10-CM

## 2019-02-16 DIAGNOSIS — N13.2 HYDRONEPHROSIS WITH URINARY OBSTRUCTION DUE TO RENAL CALCULUS: ICD-10-CM

## 2019-02-16 LAB
ACETONE SERPL-MCNC: NEGATIVE MG/DL
ALBUMIN SERPL BCP-MCNC: 2.9 G/DL (ref 3.5–5)
ALP SERPL-CCNC: 86 U/L (ref 46–116)
ALT SERPL W P-5'-P-CCNC: <6 U/L (ref 12–78)
ANION GAP SERPL CALCULATED.3IONS-SCNC: 13 MMOL/L (ref 4–13)
AST SERPL W P-5'-P-CCNC: 19 U/L (ref 5–45)
ATRIAL RATE: 77 BPM
BACTERIA UR QL AUTO: ABNORMAL /HPF
BASE EX.OXY STD BLDV CALC-SCNC: 46.2 % (ref 60–80)
BASE EXCESS BLDV CALC-SCNC: -0.8 MMOL/L
BASOPHILS # BLD AUTO: 0.03 THOUSANDS/ΜL (ref 0–0.1)
BASOPHILS NFR BLD AUTO: 0 % (ref 0–1)
BILIRUB SERPL-MCNC: 2.4 MG/DL (ref 0.2–1)
BILIRUB UR QL STRIP: NEGATIVE
BUN SERPL-MCNC: 23 MG/DL (ref 5–25)
CALCIUM SERPL-MCNC: 9.3 MG/DL (ref 8.3–10.1)
CHLORIDE SERPL-SCNC: 99 MMOL/L (ref 100–108)
CLARITY UR: CLEAR
CO2 SERPL-SCNC: 23 MMOL/L (ref 21–32)
COLOR UR: YELLOW
CREAT SERPL-MCNC: 1.95 MG/DL (ref 0.6–1.3)
EOSINOPHIL # BLD AUTO: 0.01 THOUSAND/ΜL (ref 0–0.61)
EOSINOPHIL NFR BLD AUTO: 0 % (ref 0–6)
ERYTHROCYTE [DISTWIDTH] IN BLOOD BY AUTOMATED COUNT: 13.2 % (ref 11.6–15.1)
GFR SERPL CREATININE-BSD FRML MDRD: 32 ML/MIN/1.73SQ M
GLUCOSE SERPL-MCNC: 134 MG/DL (ref 65–140)
GLUCOSE SERPL-MCNC: 138 MG/DL (ref 65–140)
GLUCOSE UR STRIP-MCNC: NEGATIVE MG/DL
HCO3 BLDV-SCNC: 23.8 MMOL/L (ref 24–30)
HCT VFR BLD AUTO: 35.5 % (ref 36.5–49.3)
HGB BLD-MCNC: 11.8 G/DL (ref 12–17)
HGB UR QL STRIP.AUTO: ABNORMAL
HOLD SPECIMEN: NORMAL
HOLD SPECIMEN: NORMAL
IMM GRANULOCYTES # BLD AUTO: 0.08 THOUSAND/UL (ref 0–0.2)
IMM GRANULOCYTES NFR BLD AUTO: 1 % (ref 0–2)
KETONES UR STRIP-MCNC: NEGATIVE MG/DL
LACTATE SERPL-SCNC: 3.3 MMOL/L (ref 0.5–2)
LEUKOCYTE ESTERASE UR QL STRIP: NEGATIVE
LYMPHOCYTES # BLD AUTO: 0.52 THOUSANDS/ΜL (ref 0.6–4.47)
LYMPHOCYTES NFR BLD AUTO: 4 % (ref 14–44)
MCH RBC QN AUTO: 31.5 PG (ref 26.8–34.3)
MCHC RBC AUTO-ENTMCNC: 33.2 G/DL (ref 31.4–37.4)
MCV RBC AUTO: 95 FL (ref 82–98)
MONOCYTES # BLD AUTO: 0.96 THOUSAND/ΜL (ref 0.17–1.22)
MONOCYTES NFR BLD AUTO: 7 % (ref 4–12)
NEUTROPHILS # BLD AUTO: 11.77 THOUSANDS/ΜL (ref 1.85–7.62)
NEUTS SEG NFR BLD AUTO: 88 % (ref 43–75)
NITRITE UR QL STRIP: NEGATIVE
NON-SQ EPI CELLS URNS QL MICRO: ABNORMAL /HPF
NRBC BLD AUTO-RTO: 0 /100 WBCS
O2 CT BLDV-SCNC: 8.8 ML/DL
P AXIS: 48 DEGREES
PCO2 BLDV: 39.1 MM HG (ref 42–50)
PH BLDV: 7.4 [PH] (ref 7.3–7.4)
PH UR STRIP.AUTO: 5.5 [PH] (ref 4.5–8)
PLATELET # BLD AUTO: 161 THOUSANDS/UL (ref 149–390)
PMV BLD AUTO: 9.5 FL (ref 8.9–12.7)
PO2 BLDV: 25.9 MM HG (ref 35–45)
POTASSIUM SERPL-SCNC: 4.7 MMOL/L (ref 3.5–5.3)
PR INTERVAL: 174 MS
PROT SERPL-MCNC: 6.8 G/DL (ref 6.4–8.2)
PROT UR STRIP-MCNC: NEGATIVE MG/DL
QRS AXIS: -45 DEGREES
QRSD INTERVAL: 124 MS
QT INTERVAL: 406 MS
QTC INTERVAL: 459 MS
RBC # BLD AUTO: 3.75 MILLION/UL (ref 3.88–5.62)
RBC #/AREA URNS AUTO: ABNORMAL /HPF
SODIUM SERPL-SCNC: 135 MMOL/L (ref 136–145)
SP GR UR STRIP.AUTO: 1.02 (ref 1–1.03)
T WAVE AXIS: -26 DEGREES
TROPONIN I SERPL-MCNC: 0.02 NG/ML
TROPONIN I SERPL-MCNC: 0.02 NG/ML
UROBILINOGEN UR QL STRIP.AUTO: 0.2 E.U./DL
VENTRICULAR RATE: 77 BPM
WBC # BLD AUTO: 13.37 THOUSAND/UL (ref 4.31–10.16)
WBC #/AREA URNS AUTO: ABNORMAL /HPF

## 2019-02-16 PROCEDURE — 87077 CULTURE AEROBIC IDENTIFY: CPT | Performed by: NURSE PRACTITIONER

## 2019-02-16 PROCEDURE — 36415 COLL VENOUS BLD VENIPUNCTURE: CPT

## 2019-02-16 PROCEDURE — 87077 CULTURE AEROBIC IDENTIFY: CPT | Performed by: EMERGENCY MEDICINE

## 2019-02-16 PROCEDURE — 84484 ASSAY OF TROPONIN QUANT: CPT | Performed by: EMERGENCY MEDICINE

## 2019-02-16 PROCEDURE — 99223 1ST HOSP IP/OBS HIGH 75: CPT | Performed by: INTERNAL MEDICINE

## 2019-02-16 PROCEDURE — 87186 SC STD MICRODIL/AGAR DIL: CPT | Performed by: NURSE PRACTITIONER

## 2019-02-16 PROCEDURE — 93005 ELECTROCARDIOGRAM TRACING: CPT

## 2019-02-16 PROCEDURE — 96361 HYDRATE IV INFUSION ADD-ON: CPT

## 2019-02-16 PROCEDURE — 94762 N-INVAS EAR/PLS OXIMTRY CONT: CPT

## 2019-02-16 PROCEDURE — 80053 COMPREHEN METABOLIC PANEL: CPT

## 2019-02-16 PROCEDURE — 93010 ELECTROCARDIOGRAM REPORT: CPT | Performed by: INTERNAL MEDICINE

## 2019-02-16 PROCEDURE — 87186 SC STD MICRODIL/AGAR DIL: CPT | Performed by: EMERGENCY MEDICINE

## 2019-02-16 PROCEDURE — 71046 X-RAY EXAM CHEST 2 VIEWS: CPT

## 2019-02-16 PROCEDURE — 87147 CULTURE TYPE IMMUNOLOGIC: CPT | Performed by: NURSE PRACTITIONER

## 2019-02-16 PROCEDURE — 85025 COMPLETE CBC W/AUTO DIFF WBC: CPT

## 2019-02-16 PROCEDURE — 82948 REAGENT STRIP/BLOOD GLUCOSE: CPT

## 2019-02-16 PROCEDURE — 82805 BLOOD GASES W/O2 SATURATION: CPT | Performed by: EMERGENCY MEDICINE

## 2019-02-16 PROCEDURE — 99285 EMERGENCY DEPT VISIT HI MDM: CPT

## 2019-02-16 PROCEDURE — 96360 HYDRATION IV INFUSION INIT: CPT

## 2019-02-16 PROCEDURE — 74176 CT ABD & PELVIS W/O CONTRAST: CPT

## 2019-02-16 PROCEDURE — 87086 URINE CULTURE/COLONY COUNT: CPT | Performed by: NURSE PRACTITIONER

## 2019-02-16 PROCEDURE — 87040 BLOOD CULTURE FOR BACTERIA: CPT | Performed by: EMERGENCY MEDICINE

## 2019-02-16 PROCEDURE — 81001 URINALYSIS AUTO W/SCOPE: CPT | Performed by: EMERGENCY MEDICINE

## 2019-02-16 PROCEDURE — 82009 KETONE BODYS QUAL: CPT | Performed by: EMERGENCY MEDICINE

## 2019-02-16 PROCEDURE — 83605 ASSAY OF LACTIC ACID: CPT | Performed by: EMERGENCY MEDICINE

## 2019-02-16 RX ORDER — UBIDECARENONE 75 MG
100 CAPSULE ORAL DAILY
Status: DISCONTINUED | OUTPATIENT
Start: 2019-02-17 | End: 2019-02-26 | Stop reason: HOSPADM

## 2019-02-16 RX ORDER — ACETAMINOPHEN 325 MG/1
650 TABLET ORAL EVERY 6 HOURS PRN
Status: DISCONTINUED | OUTPATIENT
Start: 2019-02-16 | End: 2019-02-17

## 2019-02-16 RX ORDER — INSULIN GLARGINE 100 [IU]/ML
20 INJECTION, SOLUTION SUBCUTANEOUS
Status: DISCONTINUED | OUTPATIENT
Start: 2019-02-16 | End: 2019-02-18

## 2019-02-16 RX ORDER — ROPINIROLE 2 MG/1
2 TABLET, FILM COATED, EXTENDED RELEASE ORAL
Status: DISCONTINUED | OUTPATIENT
Start: 2019-02-17 | End: 2019-02-19

## 2019-02-16 RX ORDER — ROPINIROLE 2 MG/1
2 TABLET, FILM COATED, EXTENDED RELEASE ORAL
Status: DISCONTINUED | OUTPATIENT
Start: 2019-02-16 | End: 2019-02-16

## 2019-02-16 RX ORDER — SODIUM CHLORIDE 9 MG/ML
100 INJECTION, SOLUTION INTRAVENOUS CONTINUOUS
Status: DISCONTINUED | OUTPATIENT
Start: 2019-02-16 | End: 2019-02-17

## 2019-02-16 RX ORDER — ATORVASTATIN CALCIUM 40 MG/1
80 TABLET, FILM COATED ORAL EVERY EVENING
Status: DISCONTINUED | OUTPATIENT
Start: 2019-02-16 | End: 2019-02-26 | Stop reason: HOSPADM

## 2019-02-16 RX ORDER — CARBIDOPA AND LEVODOPA 50; 200 MG/1; MG/1
1 TABLET, EXTENDED RELEASE ORAL
Status: DISCONTINUED | OUTPATIENT
Start: 2019-02-16 | End: 2019-02-26 | Stop reason: HOSPADM

## 2019-02-16 RX ORDER — ONDANSETRON 2 MG/ML
4 INJECTION INTRAMUSCULAR; INTRAVENOUS EVERY 6 HOURS PRN
Status: DISCONTINUED | OUTPATIENT
Start: 2019-02-16 | End: 2019-02-26 | Stop reason: HOSPADM

## 2019-02-16 RX ORDER — HEPARIN SODIUM 5000 [USP'U]/ML
5000 INJECTION, SOLUTION INTRAVENOUS; SUBCUTANEOUS EVERY 8 HOURS SCHEDULED
Status: DISCONTINUED | OUTPATIENT
Start: 2019-02-16 | End: 2019-02-26 | Stop reason: HOSPADM

## 2019-02-16 RX ORDER — ASPIRIN 81 MG/1
81 TABLET, CHEWABLE ORAL EVERY EVENING
Status: DISCONTINUED | OUTPATIENT
Start: 2019-02-16 | End: 2019-02-26 | Stop reason: HOSPADM

## 2019-02-16 RX ADMIN — SODIUM CHLORIDE 500 ML: 0.9 INJECTION, SOLUTION INTRAVENOUS at 13:38

## 2019-02-16 RX ADMIN — ATORVASTATIN CALCIUM 80 MG: 40 TABLET, FILM COATED ORAL at 21:40

## 2019-02-16 RX ADMIN — SODIUM CHLORIDE 1000 ML: 0.9 INJECTION, SOLUTION INTRAVENOUS at 14:56

## 2019-02-16 RX ADMIN — METOPROLOL TARTRATE 12.5 MG: 25 TABLET ORAL at 21:40

## 2019-02-16 RX ADMIN — Medication 2000 MG: at 17:44

## 2019-02-16 RX ADMIN — ACETAMINOPHEN 650 MG: 325 TABLET, FILM COATED ORAL at 21:39

## 2019-02-16 RX ADMIN — CARBIDOPA AND LEVODOPA 1 TABLET: 50; 200 TABLET, EXTENDED RELEASE ORAL at 21:41

## 2019-02-16 RX ADMIN — ASPIRIN 81 MG 81 MG: 81 TABLET ORAL at 21:41

## 2019-02-16 RX ADMIN — HEPARIN SODIUM 5000 UNITS: 5000 INJECTION, SOLUTION INTRAVENOUS; SUBCUTANEOUS at 21:40

## 2019-02-16 RX ADMIN — INSULIN GLARGINE 20 UNITS: 100 INJECTION, SOLUTION SUBCUTANEOUS at 21:40

## 2019-02-16 RX ADMIN — SODIUM CHLORIDE 100 ML/HR: 0.9 INJECTION, SOLUTION INTRAVENOUS at 21:41

## 2019-02-16 NOTE — ED NOTES
Pt reports a bedsore that is becoming painful  Pt rolled and evaluated  Pt repositioned for comfort and to increase sacral circulation  Will initiate Q2 hr repositioning       Humaira Melo RN  02/16/19 6006 (4) rarely moist

## 2019-02-16 NOTE — ED PROVIDER NOTES
History  Chief Complaint   Patient presents with    Possible UTI     EMS was called when family noticed that the pt went to the bathroom on himself, has a history of UTI also very lethargic , also has some belly pain     68-year-old male patient presents emergency department care his family  The patient has a history of urinary tract infections is presenting currently symptomatic for another urinary tract infection  According the daughter is at bedside currently the patient did not wake up this morning at his normal time  He did not take the six o'clock medications and she was concerned  The patient is very dehydrated on physical exam, he smells of ketosis, he has very dry mucous membranes  The patient has no fever currently, he is not tachycardic, there is some concern, however, that the patient is septic and possibly in a mild state of diabetic ketoacidosis as well  The patient will have labs to assess for this  History provided by:  Patient   used: No    Altered Mental Status   Presenting symptoms: lethargy    Presenting symptoms: no combativeness    Severity:  Moderate  Most recent episode: Today  Timing:  Constant  Progression:  Worsening  Chronicity:  New  Context: not dementia, not homeless, not nursing home resident and not recent change in medication    Associated symptoms: no decreased appetite and no light-headedness        Prior to Admission Medications   Prescriptions Last Dose Informant Patient Reported? Taking?    Cyanocobalamin (VITAMIN B 12 PO) 2/16/2019 at Unknown time  Yes Yes   Sig: Take 250 mg by mouth     LANTUS SOLOSTAR 100 units/mL injection pen 2/15/2019 at Unknown time  No Yes   Sig: Inject 20 Units under the skin daily at bedtime for 30 days   aspirin 81 MG tablet 2/15/2019 at Unknown time  Yes Yes   Sig: Take 81 mg by mouth every evening     atorvastatin (LIPITOR) 80 mg tablet 2/15/2019 at Unknown time  Yes Yes   Sig: TAKE 1 TABLET BY MOUTH EVERY EVENING carbidopa-levodopa (SINEMET CR)  mg per tablet 2/15/2019 at Unknown time  No Yes   Sig: Take 1 tablet by mouth daily at bedtime   carbidopa-levodopa (SINEMET)  mg per tablet 2/16/2019 at Unknown time  No Yes   Sig: Take 1 tablet by mouth 3 (three) times a day   Patient taking differently: Take 1 tablet by mouth 3 (three) times a day 0600; 1000; 1400    hydrochlorothiazide (HYDRODIURIL) 25 mg tablet 2/15/2019 at Unknown time  Yes Yes   Sig: Take 25 mg by mouth daily Tuesdays and fridays    metFORMIN (GLUCOPHAGE-XR) 500 mg 24 hr tablet   Yes No   Sig: Take 1,000 mg by mouth 2 (two) times a day   metoprolol tartrate (LOPRESSOR) 25 mg tablet 2/16/2019 at Unknown time  Yes Yes   Sig: Take 12 5 mg by mouth 2 (two) times a day   rOPINIRole (REQUIP XL) 2 MG 24 hr tablet 2/15/2019 at Unknown time  No Yes   Sig: Take 1 tablet (2 mg total) by mouth daily at bedtime      Facility-Administered Medications: None       Past Medical History:   Diagnosis Date    Arthritis     BPH (benign prostatic hyperplasia)     Diabetes [facility] [address] [facility]     Heart problem     Movement disorder     [facility]     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     Stroke [facility])        Past Surgical History:   Procedure Laterality Date    APPENDECTOMY      CARDIAC DEFIBRILLATOR PLACEMENT      CARDIAC DEFIBRILLATOR PLACEMENT      CHOLECYSTECTOMY      CORONARY ANGIOPLASTY WITH STENT PLACEMENT      DEEP BRAIN STIMULATOR PLACEMENT      EAR SURGERY      GALLBLADDER SURGERY      KNEE SURGERY      SKIN CANCER EXCISION         Family History   Problem Relation Age of Onset    Cancer Mother     Diabetes Father     Parkinsonism Father     Heart attack Father      I have reviewed and agree with the history as documented      Social History     Tobacco Use    Smoking status: Former Smoker    Smokeless tobacco: Never Used   Substance Use Topics    Alcohol use: No    Drug use: No        Review of Systems   Constitutional: Negative for decreased appetite  Neurological: Negative for light-headedness  All other systems reviewed and are negative  Physical Exam  Physical Exam   Constitutional: He is oriented to person, place, and time  He appears well-developed and well-nourished  No distress  HENT:   Head: Normocephalic and atraumatic  Right Ear: External ear normal    Left Ear: External ear normal    Eyes: Conjunctivae and EOM are normal  Right eye exhibits no discharge  Left eye exhibits no discharge  No scleral icterus  Neck: Normal range of motion  Neck supple  No JVD present  No tracheal deviation present  No thyromegaly present  Cardiovascular: Normal rate and regular rhythm  Pulmonary/Chest: Effort normal and breath sounds normal  No stridor  No respiratory distress  He has no wheezes  He has no rales  Abdominal: Soft  Bowel sounds are normal  He exhibits no distension  There is no tenderness  Musculoskeletal: Normal range of motion  He exhibits no edema, tenderness or deformity  Neurological: He is alert and oriented to person, place, and time  No cranial nerve deficit  Coordination normal    Skin: Skin is warm and dry  He is not diaphoretic  Psychiatric: He is slowed and withdrawn  Nursing note and vitals reviewed        Vital Signs  ED Triage Vitals   Temperature Pulse Respirations Blood Pressure SpO2   02/16/19 1247 02/16/19 1247 02/16/19 1247 02/16/19 1247 02/16/19 1247   98 8 °F (37 1 °C) 79 18 117/67 94 %      Temp Source Heart Rate Source Patient Position - Orthostatic VS BP Location FiO2 (%)   02/16/19 1247 02/16/19 1247 02/16/19 1247 02/16/19 1247 --   Oral Monitor Lying Right arm       Pain Score       02/16/19 1914       4           Vitals:    02/16/19 1500 02/16/19 1654 02/16/19 1830 02/16/19 2000   BP: 129/60 127/59 120/59 122/59   Pulse:  87 91 86   Patient Position - Orthostatic VS: Lying Lying Lying Lying       Visual Acuity      ED Medications  Medications aspirin chewable tablet 81 mg (has no administration in time range)   atorvastatin (LIPITOR) tablet 80 mg (has no administration in time range)   carbidopa-levodopa (SINEMET CR)  mg per ER tablet 1 tablet (has no administration in time range)   carbidopa-levodopa (SINEMET)  mg per tablet 1 tablet (has no administration in time range)   cyanocobalamin (VITAMIN B-12) tablet 100 mcg (has no administration in time range)   insulin glargine (LANTUS) subcutaneous injection 20 Units 0 2 mL (has no administration in time range)   metoprolol tartrate (LOPRESSOR) partial tablet 12 5 mg (has no administration in time range)   rOPINIRole (REQUIP XL) 24 hr tablet 2 mg (has no administration in time range)   ondansetron (ZOFRAN) injection 4 mg (has no administration in time range)   heparin (porcine) subcutaneous injection 5,000 Units (has no administration in time range)   acetaminophen (TYLENOL) tablet 650 mg (has no administration in time range)   sodium chloride 0 9 % infusion (has no administration in time range)   insulin lispro (HumaLOG) 100 units/mL subcutaneous injection 1-5 Units (has no administration in time range)   insulin lispro (HumaLOG) 100 units/mL subcutaneous injection 1-5 Units (has no administration in time range)   sodium chloride 0 9 % bolus 500 mL (0 mL Intravenous Stopped 2/16/19 1438)   sodium chloride 0 9 % bolus 1,000 mL (0 mL Intravenous Stopped 2/16/19 1656)   cefepime (MAXIPIME) 2,000 mg in dextrose 5 % 50 mL IVPB (2,000 mg Intravenous New Bag 2/16/19 1744)       Diagnostic Studies  Results Reviewed     Procedure Component Value Units Date/Time    Blood gas, venous [[id number]]  (Abnormal) Collected:  02/16/19 1456    Lab Status:  Final result Specimen:  Blood from Arm, Right Updated:  02/16/19 1513     pH, Tyree 7 402     pCO2, Tyree 39 1 mm Hg      pO2, Ty[place] 25 9 mm Hg      HCO3, Ty[place] 23 8 mmol/L      Base Excess, Tyree -0 8 mmol/L      O2 Content, Tyree 8 8 ml/dL      O2 HGB, VENOUS 46 2 % Troponin I [[id number]]  (Normal) Collected:  02/16/19 1430    Lab Status:  Final result Specimen:  Blood from Arm, Right Updated:  02/16/19 1459     Troponin I 0 02 ng/mL     Blood culture [[id number]] Collected:  02/16/19 1415    Lab Status: In process Specimen:  Blood from Arm, Right Updated:  02/16/19 1419    Urine Microscopic [[id number]]  (Abnormal) Collected:  02/16/19 1336    Lab Status:  Final result Specimen:  Urine, Straight Cath Updated:  02/16/19 1402     RBC, UA 2-4 /hpf      WBC, UA 1-2 /hpf      Epithelial Cells Moderate /hpf      Bacteria, UA None Seen /hpf     Lactic acid, plasma [[id number]]  (Abnormal) Collected:  02/16/19 1323    Lab Status:  Final result Specimen:  Blood from Arm, Left Updated:  02/16/19 1355     LACTIC ACID 3 3 mmol/L     Narrative:       Result may be elevated if tourniquet was used during collection      Acetone [[id number]]  (Normal) Collected:  02/16/19 1255    Lab Status:  Final result Specimen:  Blood from Arm, Right Updated:  02/16/19 1351     Acetone, Bld Negative    Troponin I [[id number]]  (Normal) Collected:  02/16/19 1323    Lab Status:  Final result Specimen:  Blood from Arm, Left Updated:  02/16/19 1348     Troponin I 0 02 ng/mL     UA w Reflex to Microscopic w Reflex to Culture [[id number]]  (Abnormal) Collected:  02/16/19 1336    Lab Status:  Final result Specimen:  Urine, Straight Cath Updated:  02/16/19 1346     Color, UA Yellow     Clarity, UA Clear     Specific [place], UA 1 020     pH, UA 5 5     Leukocytes, UA Negative     Nitrite, UA Negative     Protein, UA Negative mg/dl      Glucose, UA Negative mg/dl      Ketones, UA Negative mg/dl      Urobilinogen, UA 0 2 E U /dl      Bilirubin, UA Negative     Blood, UA Small    Comprehensive metabolic panel [[id number]]  (Abnormal) Collected:  02/16/19 1255    Lab Status:  Final result Specimen:  Blood from Arm, Right Updated:  02/16/19 1340     Sodium 135 mmol/L      Potassium 4 7 mmol/L      Chloride 99 mmol/L CO2 23 mmol/L      ANION GAP 13 mmol/L      BUN 23 mg/dL      Creatinine 1 95 mg/dL      Glucose 138 mg/dL      Calcium 9 3 mg/dL      AST 19 U/L      ALT <6 U/L      Alkaline Phosphatase 86 U/L      Total Protein 6 8 g/dL      Albumin 2 9 g/dL      Total Bilirubin 2 40 mg/dL      eGFR 32 ml/min/1 73sq m     Narrative:       National Kidney Disease Education Program recommendations are as follows:  GFR calculation is accurate only with a steady state creatinine  Chronic Kidney disease less than 60 ml/min/1 73 sq  meters  Kidney failure less than 15 ml/min/1 73 sq  meters  Blood culture [[id number]] Collected:  02/16/19 1323    Lab Status: In process Specimen:  Blood from Arm, Left Updated:  02/16/19 1327    CBC and differential [[id number]]  (Abnormal) Collected:  02/16/19 1255    Lab Status:  Final result Specimen:  Blood from Arm, Right Updated:  02/16/19 1301     WBC 13 37 Thousand/uL      RBC 3 75 Million/uL      Hemoglobin 11 8 g/dL      Hematocrit 35 5 %      MCV 95 fL      MCH 31 5 pg      MCHC 33 2 g/dL      RDW 13 2 %      MPV 9 5 fL      Platelets 101 Thousands/uL      nRBC 0 /100 WBCs      Neutrophils Relative 88 %      Immat GRANS % 1 %      Lymphocytes Relative 4 %      Monocytes Relative 7 %      Eosinophils Relative 0 %      Basophils Relative 0 %      Neutrophils Absolute 11 77 Thousands/µL      Immature Grans Absolute 0 08 Thousand/uL      Lymphocytes Absolute 0 52 Thousands/µL      Monocytes Absolute 0 96 Thousand/µL      Eosinophils Absolute 0 01 Thousand/µL      Basophils Absolute 0 03 Thousands/µL                  CT abdomen pelvis wo contrast   Final Result by Osbaldo Solano MD (02/16 1837)      Right hydroureteronephrosis resulting from a 6 mm calculus at the right UV junction  Stable pancreatic cysts  Small right pleural effusion  Mild cardiomegaly  Colonic diverticulosis           Workstation performed: [id number]         XR chest 2 views   ED Interpretation by Anand Rene [address], DO (02/16 1647)   Right lower lung field with apparent infiltrate  Pt is at high risk for aspiration  Final Result by Kelvin Ennis MD (02/16 1707)      No acute cardiopulmonary disease  Workstation performed: [id number]                    Procedures  Procedures       Phone Contacts  ED Phone Contact    ED Course  ED Course as of Feb 16 2021   Sat Feb 16, 2019   1606 Comprehensive metabolic panel(!)                               MDM  Number of Diagnoses or Management Options  AIDA (acute kidney injury) [facility]): new and requires workup  SIRS (systemic inflammatory response syndrome) ([facility] [address] ): new and requires workup     Amount and/or Complexity of Data Reviewed  Clinical lab tests: reviewed and ordered  Tests in the radiology section of CPT®: ordered and reviewed  Decide to obtain previous medical records or to obtain history from someone other than the patient: yes  Review and summarize past medical records: yes    Patient Progress  Patient progress: stable      Disposition  Final diagnoses:   SIRS (systemic inflammatory response syndrome) ([facility] [address] )   AIDA (acute kidney injury) ([facility] [address] )     Time reflects when diagnosis was documented in both MDM as applicable and the Disposition within this note     Time User Action Codes Description Comment    2/16/2019  4:08 PM Lenoria Shone Add [R65 10] SIRS (systemic inflammatory response syndrome) ([facility] [address] )     2/16/2019  4:08 PM Lenoria Shone Add [N17 9] AIDA (acute kidney injury) [facility])       ED Disposition     ED Disposition Condition Date/Time Comment    Admit Stable Sat Feb 16, 2019  4:08 PM Case was discussed with Dr Dacia Cm and the patient's admission status was agreed to be Admission Status: inpatient status to the service of Dr Dacia Cm           Follow-up Information    None         Current Discharge Medication List      CONTINUE these medications which have NOT CHANGED    Details   aspirin 81 MG tablet Take 81 mg by mouth every evening        atorvastatin (LIPITOR) 80 mg tablet TAKE 1 TABLET BY MOUTH EVERY EVENING      carbidopa-levodopa (SINEMET CR)  mg per tablet Take 1 tablet by mouth daily at bedtime  Qty: 90 tablet, Refills: 3    Associated Diagnoses: Parkinson disease ([facility])      carbidopa-levodopa (SINEMET)  mg per tablet Take 1 tablet by mouth 3 (three) times a day  Qty: 270 tablet, Refills: 3    Associated Diagnoses: Parkinson disease ([facility])      Cyanocobalamin (VITAMIN B 12 PO) Take 250 mg by mouth        hydrochlorothiazide (HYDRODIURIL) 25 mg tablet Take 25 mg by mouth daily Tuesdays and fridays       LANTUS SOLOSTAR 100 units/mL injection pen Inject 20 Units under the skin daily at bedtime for 30 days  Qty: 2 pen, Refills: 0    Associated Diagnoses: Type 2 diabetes mellitus ([facility])      metoprolol tartrate (LOPRESSOR) 25 mg tablet Take 12 5 mg by mouth 2 (two) times a day  Refills: 0      rOPINIRole (REQUIP XL) 2 MG 24 hr tablet Take 1 tablet (2 mg total) by mouth daily at bedtime  Qty: 90 tablet, Refills: 1    Associated Diagnoses: Parkinson's disease with use of electrical brain stimulation ([facility])      metFORMIN (GLUCOPHAGE-XR) 500 mg 24 hr tablet Take 1,000 mg by mouth 2 (two) times a day  Refills: 3           No discharge procedures on file      ED Provider  Electronically Signed by           Faith Dawkins DO  02/16/19 2021

## 2019-02-16 NOTE — H&P
History and Physical - [facility] Internal Medicine    Patient Information: Murali Suarez  [de-identified] y o  male MRN: [id number]  Unit/Bed#: ED 22 Encounter: [id number]  Admitting Physician: Sae Cooney MD  PCP: Leanna Kumar MD  Date of Admission:  02/16/19    Assessment/Plan:    Hospital Problem List:     Principal Problem:    Weakness generalized  Active Problems:    Parkinson's disease with use of electrical brain stimulation (HCC)    S/P deep brain stimulator placement    Type 2 diabetes mellitus ([facility] [address] )    HTN (hypertension)    History of CVA (cerebrovascular accident)    Ambulatory dysfunction      Plan:  Generalized weakness  Patient presented with elevated white count, elevated lactic acid  Appears dehydrated  No source of infection identified so far  Urinalysis showed no bacteria or no significant white count  Chest x-ray showed no acute cardiopulmonary disease  Patient appears very dehydrated  Will start IV fluid hydration Closely monitor I&Os  Frequent neuro checks  Will obtain CT of the abdomen without contrast due to increased creatinine  PT/OT    History of Parkinson's disease status post DBS  Continue Sinemet  Dosage discussed with patient's daughter at the bedside    Acute kidney injury on CKD  IV fluid hydration  Monitor creatinine    Type 2 diabetes  Continue home dose of Lantus and sliding scale  Hold metformin  Monitor and avoid hypoglycemia    History of CVA  No focal motor sensory neuro deficits  Continue aspirin and statin  Hypertension  Continue metoprolol    Hyperlipidemia  Continue statin    VTE Prophylaxis: Heparin  / sequential compression device   Code Status: level 3 as per his daughter  POLST: There is no POLST form on file for this patient (pre-hospital)    Anticipated Length of Stay:  Patient will be admitted on an Inpatient basis with an anticipated length of stay of  greater 2 midnights     Justification for Hospital Stay:  Generalized weakness    Total Time for Visit, including Counseling / Coordination of Care: 30 minutes  Greater than 50% of this total time spent on direct patient counseling and coordination of care  Chief Complaint:   Generalized weakness    History of Present Illness:    Bladimir Ralph  is a [de-identified] y o  male with past medical history of Parkinson's disease status post DBS, prior CVA, hypertension, hyperlipidemia who presents with generalized weakness, decreased appetite  As per patient's daughter at the bedside who provides history, she mentions that patient did not wake up this morning it is normal time and has not taken his 6 o'clock Sinemet for which she was concerned  She also reported that he has not been eating and drinking over the last 1 day  Patient complains of right lower quadrant abdominal pain  No fevers or chills  No urinary symptoms  No diarrhea  Review of Systems:    Review of Systems   Constitutional: Positive for activity change and appetite change  Negative for chills and fever  Respiratory: Negative for cough and shortness of breath  Cardiovascular: Negative for chest pain  Gastrointestinal: Positive for abdominal pain  Negative for diarrhea, nausea and vomiting  Genitourinary: Negative for difficulty urinating and dysuria  Musculoskeletal: Positive for gait problem  Negative for back pain  Skin: Positive for wound  Negative for rash  Neurological: Negative for dizziness and light-headedness         Past Medical and Surgical History:     Past Medical History:   Diagnosis Date    Arthritis     BPH (benign prostatic hyperplasia)     Diabetes ([facility] [address] )     Heart problem     Movement disorder     [facility]     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     Stroke [facility])        Past Surgical History:   Procedure Laterality Date    APPENDECTOMY      CARDIAC DEFIBRILLATOR PLACEMENT      CARDIAC DEFIBRILLATOR PLACEMENT      CHOLECYSTECTOMY      CORONARY ANGIOPLASTY WITH STENT PLACEMENT      DEEP BRAIN STIMULATOR PLACEMENT      EAR SURGERY      GALLBLADDER SURGERY      KNEE SURGERY      SKIN CANCER EXCISION         Meds/Allergies:    Prior to Admission medications    Medication Sig Start Date End Date Taking? Authorizing Provider   aspirin 81 MG tablet Take 81 mg by mouth every evening     Yes Historical Provider, MD   atorvastatin (LIPITOR) 80 mg tablet TAKE 1 TABLET BY MOUTH EVERY EVENING 8/24/17  Yes Historical Provider, MD   carbidopa-levodopa (SINEMET CR)  mg per tablet Take 1 tablet by mouth daily at bedtime 4/12/18  Yes Yessy Saldivar PA-C   carbidopa-levodopa (SINEMET)  mg per tablet Take 1 tablet by mouth 3 (three) times a day  Patient taking differently: Take 1 tablet by mouth 3 (three) times a day 0600; 1000; 1400  4/12/18  Yes Yessy Saldivar PA-C   Cyanocobalamin (VITAMIN B 12 PO) Take 250 mg by mouth     Yes Historical Provider, MD   hydrochlorothiazide (HYDRODIURIL) 25 mg tablet Take 25 mg by mouth daily Tuesdays and fridays    Yes Historical Provider, MD PETE SOLOSTAR 100 units/mL injection pen Inject 20 Units under the skin daily at bedtime for 30 days 12/6/18 2/16/19 Yes Godfrey Liang MD   metoprolol tartrate (LOPRESSOR) 25 mg tablet Take 12 5 mg by mouth 2 (two) times a day 3/30/18  Yes Historical Provider, MD   rOPINIRole (REQUIP XL) 2 MG 24 hr tablet Take 1 tablet (2 mg total) by mouth daily at bedtime 10/15/18  Yes Herlinda Rico MD   metFORMIN (GLUCOPHAGE-XR) 500 mg 24 hr tablet Take 1,000 mg by mouth 2 (two) times a day 2/21/18   Historical Provider, MD     I have reviewed home medications with patient family member  Allergies:    Allergies   Allergen Reactions    Clopidogrel Rash     PLAVIX       Social History:     Marital Status: /Civil Union   Occupation:   Patient Pre-hospital Living Situation:   Patient Pre-hospital Level of Mobility:  Patient Pre-hospital Diet Restrictions:   Substance Use History:   Social History Substance and Sexual Activity   Alcohol Use No     Social History     Tobacco Use   Smoking Status Former Smoker   Smokeless Tobacco Never Used     Social History     Substance and Sexual Activity   Drug Use No       Family History:    Family History   Problem Relation Age of Onset    Cancer Mother     Diabetes Father     Parkinsonism Father     Heart attack Father        Physical Exam:     Vitals:   Blood Pressure: 127/59 (02/16/19 1654)  Pulse: 87 (02/16/19 1654)  Temperature: 98 8 °F (37 1 °C) (02/16/19 1247)  Temp Source: Oral (02/16/19 1247)  Respirations: 22 (02/16/19 1654)  Weight - Scale: 86 3 kg (190 lb 4 1 oz) (02/16/19 1247)  SpO2: 93 % (02/16/19 1654)    Physical Exam   Constitutional: He appears well-developed and well-nourished  No distress  HENT:   Head: Normocephalic  Cardiovascular: Normal rate, regular rhythm and normal heart sounds  Pulmonary/Chest: Effort normal    Abdominal: Soft  Bowel sounds are normal  There is no tenderness  Musculoskeletal: He exhibits edema  Neurological: He is alert  Sleepy on exam however easily arousable, answering questions appropriately and following commands   Skin: Skin is warm and dry  Additional Data:     Lab Results: I have personally reviewed pertinent reports  and I have personally reviewed pertinent films in PACS    Results from last 7 days   Lab Units 02/16/19  1255   WBC Thousand/uL 13 37*   HEMOGLOBIN g/dL 11 8*   HEMATOCRIT % 35 5*   PLATELETS Thousands/uL 161   NEUTROS PCT % 88*   LYMPHS PCT % 4*   MONOS PCT % 7   EOS PCT % 0     Results from last 7 days   Lab Units 02/16/19  1255   POTASSIUM mmol/L 4 7   CHLORIDE mmol/L 99*   CO2 mmol/L 23   BUN mg/dL 23   CREATININE mg/dL 1 95*   CALCIUM mg/dL 9 3   ALK PHOS U/L 86   ALT U/L <6*   AST U/L 19           Imaging: I have personally reviewed pertinent films in PACS    Xr Chest 2 Views    Result Date: 2/16/2019  Narrative: CHEST INDICATION:   SIRS   COMPARISON:  Chest x-ray 12/4/2018 EXAM PERFORMED/VIEWS:  XR CHEST PA & LATERAL FINDINGS:  Left-sided chest wall pacemaker is identified  Pacemaker leads are intact  Right chest wall generator pack with leads extending into the neck unchanged  Cardiomediastinal silhouette appears unremarkable  The lungs are clear  No pneumothorax or pleural effusion  Osseous structures appear within normal limits for patient age  Impression: No acute cardiopulmonary disease  Workstation performed: [id number]       EKG, Pathology, and Other Studies Reviewed on Admission:   · EKG:     Allscripts / Epic Records Reviewed: Yes     ** Please Note: This note has been constructed using a voice recognition system   **

## 2019-02-16 NOTE — ED NOTES
Pt will be brought up stairs at 5:45  30 min wait for upstairs to finish preparing room  Pt was provided two pillows to support right side of body        Erlinda Junior  02/16/19 4280

## 2019-02-16 NOTE — PLAN OF CARE
Problem: Prexisting or High Potential for Compromised Skin Integrity  Goal: Skin integrity is maintained or improved  Description  INTERVENTIONS:  - Identify patients at risk for skin breakdown  - Assess and monitor skin integrity  - Assess and monitor nutrition and hydration status  - Monitor labs (i e  albumin)  - Assess for incontinence   - Turn and reposition patient  - Assist with mobility/ambulation  - Relieve pressure over bony prominences  - Avoid friction and shearing  - Provide appropriate hygiene as needed including keeping skin clean and dry  - Evaluate need for skin moisturizer/barrier cream  - Collaborate with interdisciplinary team (i e  Nutrition, Rehabilitation, etc )   - Patient/family teaching  Outcome: Progressing     Problem: Potential for Falls  Goal: Patient will remain free of falls  Description  INTERVENTIONS:  - Assess patient frequently for physical needs  -  Identify cognitive and physical deficits and behaviors that affect risk of falls    -  [place] fall precautions as indicated by assessment   - Educate patient/family on patient safety including physical limitations  - Instruct patient to call for assistance with activity based on assessment  - Modify environment to reduce risk of injury  - Consider OT/PT consult to assist with strengthening/mobility  Outcome: Progressing

## 2019-02-17 ENCOUNTER — APPOINTMENT (INPATIENT)
Dept: INTERVENTIONAL RADIOLOGY/VASCULAR | Facility: HOSPITAL | Age: 79
DRG: 871 | End: 2019-02-17
Attending: UROLOGY
Payer: COMMERCIAL

## 2019-02-17 ENCOUNTER — ANESTHESIA (INPATIENT)
Dept: INTERVENTIONAL RADIOLOGY/VASCULAR | Facility: HOSPITAL | Age: 79
DRG: 871 | End: 2019-02-17
Payer: COMMERCIAL

## 2019-02-17 PROBLEM — N13.2 HYDRONEPHROSIS WITH URINARY OBSTRUCTION DUE TO RENAL CALCULUS: Status: ACTIVE | Noted: 2019-02-16

## 2019-02-17 PROBLEM — A41.9 SEPSIS (HCC): Status: ACTIVE | Noted: 2019-02-17

## 2019-02-17 PROBLEM — R78.81 BACTEREMIA: Status: ACTIVE | Noted: 2019-02-17

## 2019-02-17 LAB
ANION GAP SERPL CALCULATED.3IONS-SCNC: 10 MMOL/L (ref 4–13)
BASE EXCESS BLDA CALC-SCNC: -9 MMOL/L (ref -2–3)
BUN SERPL-MCNC: 23 MG/DL (ref 5–25)
CA-I BLD-SCNC: 1.16 MMOL/L (ref 1.12–1.32)
CALCIUM SERPL-MCNC: 8.5 MG/DL (ref 8.3–10.1)
CHLORIDE SERPL-SCNC: 103 MMOL/L (ref 100–108)
CO2 SERPL-SCNC: 24 MMOL/L (ref 21–32)
CREAT SERPL-MCNC: 2.03 MG/DL (ref 0.6–1.3)
ERYTHROCYTE [DISTWIDTH] IN BLOOD BY AUTOMATED COUNT: 13.3 % (ref 11.6–15.1)
GFR SERPL CREATININE-BSD FRML MDRD: 30 ML/MIN/1.73SQ M
GLUCOSE SERPL-MCNC: 118 MG/DL (ref 65–140)
GLUCOSE SERPL-MCNC: 119 MG/DL (ref 65–140)
GLUCOSE SERPL-MCNC: 125 MG/DL (ref 65–140)
GLUCOSE SERPL-MCNC: 148 MG/DL (ref 65–140)
GLUCOSE SERPL-MCNC: 152 MG/DL (ref 65–140)
GLUCOSE SERPL-MCNC: 153 MG/DL (ref 65–140)
GLUCOSE SERPL-MCNC: 175 MG/DL (ref 65–140)
HCO3 BLDA-SCNC: 15.6 MMOL/L (ref 22–28)
HCT VFR BLD AUTO: 31.7 % (ref 36.5–49.3)
HCT VFR BLD CALC: 34 % (ref 36.5–49.3)
HGB BLD-MCNC: 10.5 G/DL (ref 12–17)
HGB BLDA-MCNC: 11.6 G/DL (ref 12–17)
INR PPP: 1.1 (ref 0.86–1.17)
MCH RBC QN AUTO: 31.3 PG (ref 26.8–34.3)
MCHC RBC AUTO-ENTMCNC: 33.1 G/DL (ref 31.4–37.4)
MCV RBC AUTO: 95 FL (ref 82–98)
PCO2 BLD: 17 MMOL/L (ref 21–32)
PCO2 BLD: 30.6 MM HG (ref 36–44)
PH BLD: 7.32 [PH] (ref 7.35–7.45)
PLATELET # BLD AUTO: 127 THOUSANDS/UL (ref 149–390)
PMV BLD AUTO: 9.7 FL (ref 8.9–12.7)
PO2 BLD: 82 MM HG (ref 75–129)
POTASSIUM BLD-SCNC: 4.2 MMOL/L (ref 3.5–5.3)
POTASSIUM SERPL-SCNC: 4.1 MMOL/L (ref 3.5–5.3)
PROCALCITONIN SERPL-MCNC: 0.81 NG/ML
PROTHROMBIN TIME: 14.1 SECONDS (ref 11.8–14.2)
RBC # BLD AUTO: 3.35 MILLION/UL (ref 3.88–5.62)
SAO2 % BLD FROM PO2: 95 % (ref 95–98)
SODIUM BLD-SCNC: 139 MMOL/L (ref 136–145)
SODIUM SERPL-SCNC: 137 MMOL/L (ref 136–145)
SPECIMEN SOURCE: ABNORMAL
WBC # BLD AUTO: 7.14 THOUSAND/UL (ref 4.31–10.16)

## 2019-02-17 PROCEDURE — 99222 1ST HOSP IP/OBS MODERATE 55: CPT | Performed by: UROLOGY

## 2019-02-17 PROCEDURE — 82948 REAGENT STRIP/BLOOD GLUCOSE: CPT

## 2019-02-17 PROCEDURE — C1769 GUIDE WIRE: HCPCS

## 2019-02-17 PROCEDURE — 82803 BLOOD GASES ANY COMBINATION: CPT

## 2019-02-17 PROCEDURE — 85027 COMPLETE CBC AUTOMATED: CPT | Performed by: INTERNAL MEDICINE

## 2019-02-17 PROCEDURE — C1887 CATHETER, GUIDING: HCPCS

## 2019-02-17 PROCEDURE — 84295 ASSAY OF SERUM SODIUM: CPT

## 2019-02-17 PROCEDURE — 99233 SBSQ HOSP IP/OBS HIGH 50: CPT | Performed by: NURSE PRACTITIONER

## 2019-02-17 PROCEDURE — 84145 PROCALCITONIN (PCT): CPT | Performed by: NURSE PRACTITIONER

## 2019-02-17 PROCEDURE — 50433 PLMT NEPHROURETERAL CATHETER: CPT | Performed by: RADIOLOGY

## 2019-02-17 PROCEDURE — 94762 N-INVAS EAR/PLS OXIMTRY CONT: CPT

## 2019-02-17 PROCEDURE — 0T9330Z DRAINAGE OF RIGHT KIDNEY PELVIS WITH DRAINAGE DEVICE, PERCUTANEOUS APPROACH: ICD-10-PCS | Performed by: RADIOLOGY

## 2019-02-17 PROCEDURE — 80048 BASIC METABOLIC PNL TOTAL CA: CPT | Performed by: INTERNAL MEDICINE

## 2019-02-17 PROCEDURE — 87147 CULTURE TYPE IMMUNOLOGIC: CPT | Performed by: RADIOLOGY

## 2019-02-17 PROCEDURE — 84132 ASSAY OF SERUM POTASSIUM: CPT

## 2019-02-17 PROCEDURE — 82330 ASSAY OF CALCIUM: CPT

## 2019-02-17 PROCEDURE — 87077 CULTURE AEROBIC IDENTIFY: CPT | Performed by: RADIOLOGY

## 2019-02-17 PROCEDURE — 87086 URINE CULTURE/COLONY COUNT: CPT | Performed by: RADIOLOGY

## 2019-02-17 PROCEDURE — 85610 PROTHROMBIN TIME: CPT | Performed by: RADIOLOGY

## 2019-02-17 PROCEDURE — 87186 SC STD MICRODIL/AGAR DIL: CPT | Performed by: RADIOLOGY

## 2019-02-17 PROCEDURE — 85014 HEMATOCRIT: CPT

## 2019-02-17 PROCEDURE — 50433 PLMT NEPHROURETERAL CATHETER: CPT

## 2019-02-17 PROCEDURE — 82947 ASSAY GLUCOSE BLOOD QUANT: CPT

## 2019-02-17 PROCEDURE — C2625 STENT, NON-COR, TEM W/DEL SY: HCPCS

## 2019-02-17 RX ORDER — FENTANYL CITRATE/PF 50 MCG/ML
50 SYRINGE (ML) INJECTION
Status: DISCONTINUED | OUTPATIENT
Start: 2019-02-17 | End: 2019-02-17

## 2019-02-17 RX ORDER — NEOSTIGMINE METHYLSULFATE 1 MG/ML
INJECTION INTRAVENOUS AS NEEDED
Status: DISCONTINUED | OUTPATIENT
Start: 2019-02-17 | End: 2019-02-17 | Stop reason: SURG

## 2019-02-17 RX ORDER — FENTANYL CITRATE 50 UG/ML
INJECTION, SOLUTION INTRAMUSCULAR; INTRAVENOUS AS NEEDED
Status: DISCONTINUED | OUTPATIENT
Start: 2019-02-17 | End: 2019-02-17 | Stop reason: SURG

## 2019-02-17 RX ORDER — GLYCOPYRROLATE 0.2 MG/ML
INJECTION INTRAMUSCULAR; INTRAVENOUS AS NEEDED
Status: DISCONTINUED | OUTPATIENT
Start: 2019-02-17 | End: 2019-02-17 | Stop reason: SURG

## 2019-02-17 RX ORDER — TAMSULOSIN HYDROCHLORIDE 0.4 MG/1
0.4 CAPSULE ORAL
Status: DISCONTINUED | OUTPATIENT
Start: 2019-02-17 | End: 2019-02-17

## 2019-02-17 RX ORDER — ACETAMINOPHEN 650 MG/1
650 SUPPOSITORY RECTAL EVERY 6 HOURS PRN
Status: DISCONTINUED | OUTPATIENT
Start: 2019-02-17 | End: 2019-02-26 | Stop reason: HOSPADM

## 2019-02-17 RX ORDER — ACETAMINOPHEN 650 MG/1
325 SUPPOSITORY RECTAL EVERY 6 HOURS PRN
Status: DISCONTINUED | OUTPATIENT
Start: 2019-02-17 | End: 2019-02-17

## 2019-02-17 RX ORDER — SODIUM CHLORIDE 9 MG/ML
INJECTION, SOLUTION INTRAVENOUS CONTINUOUS PRN
Status: DISCONTINUED | OUTPATIENT
Start: 2019-02-17 | End: 2019-02-17 | Stop reason: SURG

## 2019-02-17 RX ORDER — LIDOCAINE HYDROCHLORIDE 10 MG/ML
INJECTION, SOLUTION INFILTRATION; PERINEURAL CODE/TRAUMA/SEDATION MEDICATION
Status: COMPLETED | OUTPATIENT
Start: 2019-02-17 | End: 2019-02-17

## 2019-02-17 RX ORDER — PROPOFOL 10 MG/ML
INJECTION, EMULSION INTRAVENOUS AS NEEDED
Status: DISCONTINUED | OUTPATIENT
Start: 2019-02-17 | End: 2019-02-17 | Stop reason: SURG

## 2019-02-17 RX ORDER — SODIUM CHLORIDE 9 MG/ML
125 INJECTION, SOLUTION INTRAVENOUS CONTINUOUS
Status: DISCONTINUED | OUTPATIENT
Start: 2019-02-17 | End: 2019-02-18

## 2019-02-17 RX ORDER — ROCURONIUM BROMIDE 10 MG/ML
INJECTION, SOLUTION INTRAVENOUS AS NEEDED
Status: DISCONTINUED | OUTPATIENT
Start: 2019-02-17 | End: 2019-02-17 | Stop reason: SURG

## 2019-02-17 RX ORDER — SUCCINYLCHOLINE CHLORIDE 20 MG/ML
INJECTION INTRAMUSCULAR; INTRAVENOUS AS NEEDED
Status: DISCONTINUED | OUTPATIENT
Start: 2019-02-17 | End: 2019-02-17 | Stop reason: SURG

## 2019-02-17 RX ORDER — SODIUM CHLORIDE, SODIUM GLUCONATE, SODIUM ACETATE, POTASSIUM CHLORIDE, MAGNESIUM CHLORIDE, SODIUM PHOSPHATE, DIBASIC, AND POTASSIUM PHOSPHATE .53; .5; .37; .037; .03; .012; .00082 G/100ML; G/100ML; G/100ML; G/100ML; G/100ML; G/100ML; G/100ML
1000 INJECTION, SOLUTION INTRAVENOUS ONCE
Status: COMPLETED | OUTPATIENT
Start: 2019-02-17 | End: 2019-02-18

## 2019-02-17 RX ORDER — CHLORHEXIDINE GLUCONATE 0.12 MG/ML
15 RINSE ORAL EVERY 12 HOURS SCHEDULED
Status: DISCONTINUED | OUTPATIENT
Start: 2019-02-17 | End: 2019-02-26 | Stop reason: HOSPADM

## 2019-02-17 RX ADMIN — CHLORHEXIDINE GLUCONATE 0.12% ORAL RINSE 15 ML: 1.2 LIQUID ORAL at 21:40

## 2019-02-17 RX ADMIN — INSULIN LISPRO 1 UNITS: 100 INJECTION, SOLUTION INTRAVENOUS; SUBCUTANEOUS at 21:38

## 2019-02-17 RX ADMIN — ACETAMINOPHEN 650 MG: 325 TABLET, FILM COATED ORAL at 03:31

## 2019-02-17 RX ADMIN — HEPARIN SODIUM 5000 UNITS: 5000 INJECTION, SOLUTION INTRAVENOUS; SUBCUTANEOUS at 13:55

## 2019-02-17 RX ADMIN — PROPOFOL 100 MG: 10 INJECTION, EMULSION INTRAVENOUS at 16:36

## 2019-02-17 RX ADMIN — ACETAMINOPHEN 325 MG: 650 SUPPOSITORY RECTAL at 20:42

## 2019-02-17 RX ADMIN — NEOSTIGMINE METHYLSULFATE 3 MG: 1 INJECTION, SOLUTION INTRAVENOUS at 17:34

## 2019-02-17 RX ADMIN — SODIUM CHLORIDE: 9 INJECTION, SOLUTION INTRAVENOUS at 16:27

## 2019-02-17 RX ADMIN — IOHEXOL 40 ML: 300 INJECTION, SOLUTION INTRAVENOUS at 18:09

## 2019-02-17 RX ADMIN — SODIUM CHLORIDE, SODIUM GLUCONATE, SODIUM ACETATE, POTASSIUM CHLORIDE AND MAGNESIUM CHLORIDE 1000 ML: 526; 502; 368; 37; 30 INJECTION, SOLUTION INTRAVENOUS at 20:03

## 2019-02-17 RX ADMIN — CARBIDOPA AND LEVODOPA 1 TABLET: 25; 100 TABLET ORAL at 09:17

## 2019-02-17 RX ADMIN — CEFEPIME 2000 MG: 2 INJECTION, POWDER, FOR SOLUTION INTRAVENOUS at 14:54

## 2019-02-17 RX ADMIN — ROCURONIUM BROMIDE 20 MG: 10 INJECTION INTRAVENOUS at 16:42

## 2019-02-17 RX ADMIN — VANCOMYCIN HYDROCHLORIDE 1500 MG: 1 INJECTION, POWDER, LYOPHILIZED, FOR SOLUTION INTRAVENOUS at 20:06

## 2019-02-17 RX ADMIN — FENTANYL CITRATE 100 MCG: 50 INJECTION, SOLUTION INTRAMUSCULAR; INTRAVENOUS at 16:36

## 2019-02-17 RX ADMIN — SODIUM CHLORIDE 100 ML/HR: 0.9 INJECTION, SOLUTION INTRAVENOUS at 11:29

## 2019-02-17 RX ADMIN — LIDOCAINE HYDROCHLORIDE 5 ML: 10 INJECTION, SOLUTION INFILTRATION; PERINEURAL at 17:11

## 2019-02-17 RX ADMIN — GLYCOPYRROLATE 0.4 MG: 0.2 INJECTION, SOLUTION INTRAMUSCULAR; INTRAVENOUS at 17:34

## 2019-02-17 RX ADMIN — VITAM B12 100 MCG: 100 TAB at 09:17

## 2019-02-17 RX ADMIN — HEPARIN SODIUM 5000 UNITS: 5000 INJECTION, SOLUTION INTRAVENOUS; SUBCUTANEOUS at 05:23

## 2019-02-17 RX ADMIN — SUCCINYLCHOLINE CHLORIDE 100 MG: 20 INJECTION, SOLUTION INTRAMUSCULAR; INTRAVENOUS at 16:36

## 2019-02-17 RX ADMIN — HEPARIN SODIUM 5000 UNITS: 5000 INJECTION, SOLUTION INTRAVENOUS; SUBCUTANEOUS at 21:37

## 2019-02-17 RX ADMIN — INSULIN GLARGINE 20 UNITS: 100 INJECTION, SOLUTION SUBCUTANEOUS at 21:37

## 2019-02-17 NOTE — ANESTHESIA PREPROCEDURE EVALUATION
Review of Systems/Medical History    Chart reviewed  No history of anesthetic complications     Cardiovascular  EKG reviewed, Exercise tolerance (METS): <4,  Pacemaker/AICD, Hypertension , CAD , Cardiac stents  CHF ,   Comment: Bacteremic and septic  Gram positive cocci,  Pulmonary  Negative pulmonary ROS        GI/Hepatic       Prostatic disorder, benign prostatic hyperplasia       Endo/Other  Diabetes ,   Comment: DKA    GYN  Negative gynecology ROS          Hematology  Negative hematology ROS      Musculoskeletal    Arthritis     Neurology    CVA , residual symptoms, Aphasia/Dysphagia,   Comment: parkinson's with DBS Psychology   Negative psychology ROS                   Anesthesia Plan  ASA Score- 4 Emergent    Anesthesia Type- general with ASA Monitors  Additional Monitors:   Airway Plan: ETT  Plan Factors- Patient instructed to abstain from smoking on day of procedure  Patient did not smoke on day of surgery  Induction- rapid sequence induction and intravenous  Postoperative Plan- Plan for postoperative opioid use  Planned trial extubation    Informed Consent- Anesthetic plan and risks discussed with patient

## 2019-02-17 NOTE — PROGRESS NOTES
Progress Note - Roxana Schaumann  1940, [de-identified] y o  male MRN: [id number]    Unit/Bed#: -[facility] Encounter: [id number]    Primary Care Provider: Roque Martinez MD   Date and time admitted to hospital: 2/16/2019 12:44 PM        * Sepsis [facility])  Assessment & Plan  · Present on admission evident by fever leukocytosis CT imaging findings of a hydronephrosis with renal calculi at the UPJ junction  · Consult Urology  · Place Ellis for now  · I/O  · Continue IV cefepime  · Blood cultures x2 positive for gram-positive cocci in pairs  · Patient further reporting difficulty eating will put to a pureed thin liquid have SLP evaluate    Bacteremia  Assessment & Plan  · Blood cultures positive x2 gram-positive cocci in pairs  · Check a procalcitonin now and in a m  · Further will obtain repeat blood cultures in a m  Hydronephrosis with urinary obstruction due to renal calculus  Assessment & Plan  · Plan as mentioned above    Ambulatory dysfunction  Assessment & Plan  · PT OT evaluation    HTN (hypertension)  Assessment & Plan  · Patient has been on the hypotensive side this is likely in setting of sepsis  · Continue to monitor  · Increase IV fluids to 125    Parkinson's disease with use of electrical brain stimulation (HCC)  Assessment & Plan  · On Sinemet continue    Type 2 diabetes mellitus [facility])  Assessment & Plan  Lab Results   Component Value Date    HGBA1C 6 9 (H) 09/19/2015       Recent Labs     02/16/19  2112 02/17/19  0649 02/17/19  1058   POCGLU 134 119 125       Blood Sugar Average: Last 72 hrs:  (P) 126     Hold metformin  Continue SSI and Lantus      VTE Pharmacologic Prophylaxis:   Pharmacologic: Heparin  Mechanical VTE Prophylaxis in Place: Yes    Patient Centered Rounds: I have performed bedside rounds with nursing staff today      Discussions with Specialists or Other Care Team Provider:  Urology    Education and Discussions with Family / Patient:  Attempt to call daughter she was out not good timing will try to call her again and 1 hour    Time Spent for Care: 45 minutes  More than 50% of total time spent on counseling and coordination of care as described above  Current Length of Stay: 1 day(s)    Current Patient Status: Inpatient   Certification Statement: The patient will continue to require additional inpatient hospital stay due to Sepsis bacteremia setting of urological infection    Discharge Plan:  Not medically cleared    Code Status: Level 3 - DNAR and DNI      Subjective:   Patient sleep lethargic however able to follow commands and answer questions appropriately  Patient reports feeling not well denies pain denies fever currently knows name date of birth knows that he is at [facility] and knows it is day  Patient is difficult to keep await baseline mentation given Parkinson's history and CVA unclear clarify with daughter  Attempt to reach daughter she was in an area where she could not talk and stated she would be in a better position 1 hour will attempt to call her again  Objective:     Vitals:   Temp (24hrs), Av 3 °F (37 9 °C), Min:98 7 °F (37 1 °C), Max:102 8 °F (39 3 °C)    Temp:  [98 7 °F (37 1 °C)-102 8 °F (39 3 °C)] 100 °F (37 8 °C)  HR:  [69-91] 71  Resp:  [16-22] 16  BP: ()/(43-92) 102/50  SpO2:  [89 %-96 %] 95 %  Body mass index is 25 98 kg/m²  Input and Output Summary (last 24 hours): Intake/Output Summary (Last 24 hours) at 2019 1459  Last data filed at 2019 1349  Gross per 24 hour   Intake 2380 ml   Output 300 ml   Net 2080 ml       Physical Exam:     Physical Exam   Constitutional: He is oriented to person, place, and time  He appears lethargic  He appears toxic  He appears ill  HENT:   Head: Normocephalic and atraumatic  Eyes: Conjunctivae and EOM are normal    Neck: Normal range of motion  Cardiovascular: Normal rate, regular rhythm and normal heart sounds  Pulmonary/Chest: Effort normal and breath sounds normal    Abdominal: Soft   Bowel sounds are normal  There is no tenderness  Genitourinary:   Genitourinary Comments: Ellis in place draining clear yellow urine   Musculoskeletal: Normal range of motion  Neurological: He is oriented to person, place, and time  He appears lethargic  Skin: Skin is warm  He is diaphoretic  Psychiatric: He has a normal mood and affect  His behavior is normal          Additional Data:     Labs:    Results from last 7 days   Lab Units 02/17/19  0458 02/16/19  1255   WBC Thousand/uL 7 14 13 37*   HEMOGLOBIN g/dL 10 5* 11 8*   HEMATOCRIT % 31 7* 35 5*   PLATELETS Thousands/uL 127* 161   NEUTROS PCT %  --  88*   LYMPHS PCT %  --  4*   MONOS PCT %  --  7   EOS PCT %  --  0     Results from last 7 days   Lab Units 02/17/19  0458 02/16/19  1255   SODIUM mmol/L 137 135*   POTASSIUM mmol/L 4 1 4 7   CHLORIDE mmol/L 103 99*   CO2 mmol/L 24 23   BUN mg/dL 23 23   CREATININE mg/dL 2 03* 1 95*   ANION GAP mmol/L 10 13   CALCIUM mg/dL 8 5 9 3   ALBUMIN g/dL  --  2 9*   TOTAL BILIRUBIN mg/dL  --  2 40*   ALK PHOS U/L  --  86   ALT U/L  --  <6*   AST U/L  --  19   GLUCOSE RANDOM mg/dL 118 138         Results from last 7 days   Lab Units 02/17/19  1058 02/17/19  0649 02/16/19  2112   POC GLUCOSE mg/dl 125 119 134         Results from last 7 days   Lab Units 02/16/19  1323   LACTIC ACID mmol/L 3 3*           * I Have Reviewed All Lab Data Listed Above  * Additional Pertinent Lab Tests Reviewed:  [facility] Admission Reviewed    Imaging:    Imaging Reports Reviewed Today Include:  CT as mentioned above      Recent Cultures (last 7 days):     Results from last 7 days   Lab Units 02/16/19  1415 02/16/19  1323   GRAM STAIN RESULT  Gram positive cocci in pairs and chains* Gram positive cocci in pairs and chains*       Last 24 Hours Medication List:     Current Facility-Administered Medications:  acetaminophen 650 mg Oral Q6H PRN Kristina Barkley MD    aspirin 81 mg Oral QPM Kristina Barkley MD    atorvastatin 80 mg Oral QPM Kathy Ambriz MD    carbidopa-levodopa 1 tablet Oral HS Kathy Ambriz MD    carbidopa-levodopa 1 tablet Oral TID Kathy Ambriz MD    cefepime 2,000 mg Intravenous Q24H VIVIAN Barragan Last Rate: 2,000 mg (02/17/19 1454)   vitamin B-12 100 mcg Oral Daily Kathy Ambriz MD    heparin (porcine) 5,000 Units Subcutaneous Q8H Javon Peres MD    insulin glargine 20 Units Subcutaneous HS Kathy Ambriz MD    insulin lispro 1-5 Units Subcutaneous TID Manny Quiroga MD    insulin lispro 1-5 Units Subcutaneous HS Kathy Ambriz MD    metoprolol tartrate 12 5 mg Oral BID Kathy Ambriz MD    ondansetron 4 mg Intravenous Q6H PRN Kathy Ambriz MD    rOPINIRole 2 mg Oral HS Kathy Ambriz MD    sodium chloride 125 mL/hr Intravenous Continuous VIVIAN Barragan    tamsulosin 0 4 mg Oral Daily With Yoko Roblero MD         Today, Patient Was Seen By: VIVIAN Barragan    ** Please Note: Dictation voice to text software may have been used in the creation of this document   **

## 2019-02-17 NOTE — BRIEF OP NOTE (RAD/CATH)
Right Nephroureterostomy placement  Procedure Note    PATIENT NAME: Swathi Lua  : 1940  MRN: [id number]     Pre-op Diagnosis:   1  SIRS (systemic inflammatory response syndrome) (HCC)    2  AIDA (acute kidney injury) ([address] )    3  Hydronephrosis with urinary obstruction due to renal calculus      Post-op Diagnosis:   1  SIRS (systemic inflammatory response syndrome) (HCC)    2   AIDA (acute kidney injury) ([address] )    3  Hydronephrosis with urinary obstruction due to renal calculus        Surgeon:   Bill Barber DO  Assistants:     No qualified resident was available, Resident is only observing    Estimated Blood Loss: minimal    Findings: right hydronephrosis    Specimens: urine for culture    Complications:  None apparent    Anesthesia: Local and Extension Nani Clinton DO     Date: 2019  Time: 5:35 PM

## 2019-02-17 NOTE — PROGRESS NOTES
Patient arrived to IR for right nephrostomy placement    The procedure and risks were discussed with the patient's son  All questions were answered  Informed consent was obtained

## 2019-02-17 NOTE — PROGRESS NOTES
As per DR Eugene Sellers, pt is to have telemetry monitoring and continuous pulse ox  Telemetry started and Respiratory Therapist notified to start Continuous pulse ox

## 2019-02-17 NOTE — UTILIZATION REVIEW
Initial Clinical Review    Admission: Date/Time/Statement: 2/16/19 @ 1609   Orders Placed This Encounter   Procedures    Inpatient Admission (expected length of stay for this patient Order details is greater than two midnights)     Standing Status:   Standing     Number of Occurrences:   1     Order Specific Question:   Admitting Physician     Answer:   Eryn Fung [[id number]]     Order Specific Question:   Level of Care     Answer:   Med Surg [[id number]]     Order Specific Question:   Estimated length of stay     Answer:   More than 2 Midnights     Order Specific Question:   Certification     Answer:   I certify that inpatient services are medically necessary for this patient for a duration of greater than two midnights  See H&P and MD Progress Notes for additional information about the patient's course of treatment  ED: Date/Time/Mode of Arrival:   ED Arrival Information     Expected Arrival Acuity Means of Arrival Escorted By Service Admission Type    - 2/16/2019 12:43 Urgent Ambulance [facility] General Medicine Urgent    Arrival Complaint    WEAKNESS        Chief Complaint:   Chief Complaint   Patient presents with    Possible UTI     EMS was called when family noticed that the pt went to the bathroom on himself, has a history of UTI also very lethargic , also has some belly pain     History of Illness: [de-identified] male patient presents Ta history of urinary tract infections is presenting currently symptomatic for another urinary tract infection        ED Vital Signs:   ED Triage Vitals   Temperature Pulse Respirations Blood Pressure SpO2   02/16/19 1247 02/16/19 1247 02/16/19 1247 02/16/19 1247 02/16/19 1247   98 8 °F (37 1 °C) 79 18 117/67 94 %      Temp Source Heart Rate Source Patient Position - Orthostatic VS BP Location FiO2 (%)   02/16/19 1247 02/16/19 1247 02/16/19 1247 02/16/19 1247 --   Oral Monitor Lying Right arm       Pain Score       02/16/19 1914       4        Wt Readings from Last 1 Encounters:   02/16/19 84 5 kg (186 lb 4 6 oz)     Vital Signs (abnormal):   02/17/19 0331  102 8 °F (39 3 °C)Abnormal                 Temp: Nurse made aware  at 02/17/19 0331   02/17/19 0005        90/50      Lying   02/17/19 0000  99 2 °F (37 3 °C)  69  19  75/43Abnormal   89 %Abnormal   None (Room air)  Lying   02/16/19 2000  100 1 °F (37 8 °C)  86  18  122/59  95 %  None (Room air)  Lying   02/16/19 1830  101 1 °F (38 4 °C)Abnormal   91  18  120/59  94 %         Pertinent Labs/Diagnostic Test Results:   CT Abd/ Pelvis - Right hydroureteronephrosis resulting from a 6 mm calculus at the right UV junction  Stable pancreatic cysts  Small right pleural effusion  Mild cardiomegaly  02/16/19 1456    pH, [place] 7 300 - 7 400 7 402 High     pCO2, [place] 42 0 - 50 0 mm Hg 39 1 Low     pO2, [place] 35 0 - 45 0 mm Hg 25 9 Low     HCO3, [place] 24 - 30 mmol/L 23 8 Low     Base Excess, [place] mmol/L -0 8    O2 Content, [place] ml/dL 8 8    O2 HGB, VENOUS 60 0 - 80 0 % 46 2 Low       Na = 135  Creat = 1 95  H/H = 10 5/ 31 7    ED Treatment:   Medication Administration from 02/16/2019 1243 to 02/16/2019 1830       Date/Time Order Dose Route Action     02/16/2019 1338 sodium chloride 0 9 % bolus 500 mL 500 mL Intravenous New Bag     02/16/2019 1456 sodium chloride 0 9 % bolus 1,000 mL 1,000 mL Intravenous New Bag     02/16/2019 1744 cefepime (MAXIPIME) 2,000 mg in dextrose 5 % 50 mL IVPB 2,000 mg Intravenous New Bag        Past Medical/Surgical History:    Active Ambulatory Problems     Diagnosis Date Noted    [facility] 04/12/2018    S/P deep brain stimulator placement 04/13/2018    Drooling 04/13/2018    Dysphagia 10/15/2018    Pancreatic cyst 12/04/2018    Weakness generalized 12/04/2018    Acute metabolic encephalopathy 88/44/8347    Type 2 diabetes mellitus [facility] ) 12/04/2018    HTN (hypertension) 12/04/2018    History of CVA (cerebrovascular accident) 12/04/2018  Hypoglycemia     Concussion 01/15/2019    Ambulatory dysfunction 01/15/2019    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     Resolved Ambulatory Problems     Diagnosis Date Noted    No Resolved Ambulatory Problems     Past Medical History:   Diagnosis Date    Arthritis     BPH (benign prostatic hyperplasia)     Diabetes [facility] )     Heart problem     Movement disorder     [facility]'s [facility]     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     Stroke [facility])      Admitting Diagnosis: Weakness generalized [R53 1]  SIRS (systemic inflammatory response syndrome) ([facility] ) [R65 10]  AIDA (acute kidney injury) ([facility] ) [N17 9]     Age/Sex: [de-identified] y o  male     Assessment/Plan:   [de-identified] Male to ED with Generalized weakness and decrease appetite  As per patient's daughter at the bedside who provides history, she mentions that patient did not wake up this morning it is normal time and has not taken his 6 o'clock Sinemet for which she was concerned  She also reported that he has not been eating and drinking over the last 1 day  Patient complains of right lower quadrant abdominal pain  Pt is being admitted with Generalized weakness/ Acute kidney injury on CKD  Patient presented with elevated white count, elevated lactic acid  Appears dehydrated  IVF  Monitor creat  IVF  Closely monitor I&Os  Frequent neurological checks    PT/OT       Admission Orders:  NPO  2/18 OR - CYSTOSCOPY URETEROSCOPY WITH LITHOTRIPSY HOLMIUM LASER, RETROGRADE PYELOGRAM AND INSERTION STENT URETERAL (Right Ureter)    Bld culture x2  Neurological checks q4h  Urology cons  PT/OT eval and treat    Scheduled Meds:   Current Facility-Administered Medications:  aspirin 81 mg Oral QPM   atorvastatin 80 mg Oral QPM   carbidopa-levodopa 1 tablet Oral HS   carbidopa-levodopa 1 tablet Oral TID   cefepime 2,000 mg Intravenous Q24H   vitamin B-12 100 mcg Oral Daily   heparin (porcine) 5,000 Units Subcutaneous [facility] insulin glargine 20 Units Subcutaneous HS   insulin lispro 1-5 Units Subcutaneous TID AC   insulin lispro 1-5 Units Subcutaneous HS   metoprolol tartrate 12 5 mg Oral BID   rOPINIRole 2 mg Oral HS   tamsulosin 0 4 mg Oral Daily With Dinner     Continuous Infusions:   sodium chloride 100 mL/hr Last Rate: 100 mL/hr (02/17/19 1129)     PRN Meds:   acetaminophen    ondansetron

## 2019-02-17 NOTE — CONSULTS
CONSULT    Patient Name: Roxana Schaumann  Patient MRN: [id number]  Date of Service: 2/17/2019   Date / Time Note Created: 2/17/2019 3:03 PM   Referring Provider:  Dr Obdulia Rivas  Provider Creating Note: Vesna Jefferson MD    PCP: Roque Martinez  Attending Provider:  Shasta Lobato MD    Reason for Consult:  Sepsis from right ureteral stone    Assessment / Plan:       Impressions  Right Ureteral Calculus  Right Hydronephrosis  Clinical Uro Sepsis    Recommendations  1  Initiate aggressive IVFs   2  Case discussed in detail with SLIM, because of the patient's progressively critical status it was recommended against taking the patient to the operating room so will recommend placement of a nephrostomy tube, thereby IR was consulted and contacted for a right nephrostomy tube placement this evening  3  ATBs empirically while awaiting culture         HPI   Source:the chart and SLIM  40-year-old gentleman presented to the emergency department yesterday with fever of 100 to dehydration  He had symptomatology of diabetic ketoacidosis as well  Following his admission the patient was worked up for infectious process and suspicion of a urinary tract infection was promoted  A CT scan the abdomen pelvis demonstrated a 6 mm mid ureteral stone with hydronephrosis  During the patient's brief hospitalization, he has become less responsive and more obtunded  He is manifesting progressive systemic symptoms of sepsis  My discussion with his medical doctors involved proceeding with definitive planning for decompression of his pelvicaliceal system        Past Medical History:   Diagnosis Date    Arthritis     BPH (benign prostatic hyperplasia)     [facility]     Heart problem     Movement disorder     Parkinson's disease ([facility]     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     Stroke [facility])        Past Surgical History:   Procedure Laterality Date    APPENDECTOMY      CARDIAC DEFIBRILLATOR PLACEMENT      CARDIAC DEFIBRILLATOR PLACEMENT      CHOLECYSTECTOMY      CORONARY ANGIOPLASTY WITH STENT PLACEMENT      DEEP BRAIN STIMULATOR PLACEMENT      EAR SURGERY      GALLBLADDER SURGERY      KNEE SURGERY      SKIN CANCER EXCISION         Family History   Problem Relation Age of Onset    Cancer Mother     Diabetes Father     Parkinsonism Father     Heart attack Father        Social History     Socioeconomic History    Marital status: /Civil Union     Spouse name: Not on file    Number of children: Not on file    Years of education: Not on file    Highest education level: Not on file   Occupational History    Not on file   Social Needs    Financial resource strain: Not on file    Food insecurity:     Worry: Not on file     Inability: Not on file    Transportation needs:     Medical: Not on file     Non-medical: Not on file   Tobacco Use    Smoking status: Former Smoker    Smokeless tobacco: Never Used   Substance and Sexual Activity    Alcohol use: No    Drug use: No    Sexual activity: Not on file   Lifestyle    Physical activity:     Days per week: Not on file     Minutes per session: Not on file    Stress: Not on file   Relationships    Social connections:     Talks on phone: Not on file     Gets together: Not on file     Attends Rastafari service: Not on file     Active member of club or organization: Not on file     Attends meetings of clubs or organizations: Not on file     Relationship status: Not on file    Intimate partner violence:     Fear of current or ex partner: Not on file     Emotionally abused: Not on file     Physically abused: Not on file     Forced sexual activity: Not on file   Other Topics Concern    Not on file   Social History Narrative    Not on file       Allergies   Allergen Reactions    Clopidogrel Rash     PLAVIX         Review of Systems  10 point review of systems, acquired from chart, and as noted in HPI  Altered Mental Status Presenting symptoms: lethargy    Presenting symptoms: no combativeness    Severity:  Moderate  Context: not dementia,  Constitutional: Negative for decreased appetite  Neurological: Negative for light-headedness  All other systems reviewed and are negative    Chart Review   Allergies, current medications, history, problem list    Vital Signs  /50 (BP Location: Right arm)   Pulse 71   Temp 100 °F (37 8 °C) (Oral)   Resp 16   Ht 5' 11" (1 803 m)   Wt 84 5 kg (186 lb 4 6 oz)   SpO2 95%   BMI 25 98 kg/m²     Physical Exam  Neck:  Supple  Lungs:  Clear  Heart:  Tachycardia  Abdomen:  Soft nontender, no CVA tenderness  :  Ellis catheter in place  Extremities:  Cool and clammy  Skin:  Pallor  Cool and clammy     Laboratory Studies  Lab Results   Component Value Date    HGBA1C 6 9 (H) 09/19/2015     10/01/2015    K 4 1 02/17/2019    K 4 5 10/01/2015     02/17/2019     10/01/2015    CO2 24 02/17/2019    CO2 29 10/01/2015    GLUCOSE 92 10/01/2015    CREATININE 2 03 (H) 02/17/2019    CREATININE 0 96 10/01/2015    BUN 23 02/17/2019    BUN 10 10/01/2015    MG 1 6 12/05/2018    MG 1 7 09/18/2015    PHOS 3 2 12/05/2018     Lab Results   Component Value Date    WBC 7 14 02/17/2019    WBC 6 06 10/01/2015    RBC 3 35 (L) 02/17/2019    RBC 4 22 10/01/2015    HGB 10 5 (L) 02/17/2019    HGB 12 9 10/01/2015    HCT 31 7 (L) 02/17/2019    HCT 38 7 10/01/2015    MCV 95 02/17/2019    MCV 92 10/01/2015    MCH 31 3 02/17/2019    MCH 30 6 10/01/2015    RDW 13 3 02/17/2019    RDW 13 7 10/01/2015     (L) 02/17/2019     10/01/2015     Urine dipstick shows positive for WBC's  Imaging and Other Studies  )  Ct Abdomen Pelvis Wo Contrast    Result Date: 2/16/2019  Narrative: CT ABDOMEN AND PELVIS WITHOUT IV CONTRAST INDICATION:   Abdominal pain, unspecified  Possible UTI  COMPARISON:  CT abdomen pelvis 12/4/2018   TECHNIQUE:  CT examination of the abdomen and pelvis was performed without intravenous contrast   Axial, sagittal, and coronal 2D reformatted images were created from the source data and submitted for interpretation  Radiation dose length product (DLP) for this visit:  970 mGy-cm   This examination, like all CT scans performed in the [facility], was performed utilizing techniques to minimize radiation dose exposure, including the use of iterative reconstruction and automated exposure control  Enteric contrast was not administered  FINDINGS: ABDOMEN LOWER CHEST:  Calcified pleural plaques in the lung bases  Cardiomegaly  No pericardial effusion  Small right pleural effusion  LIVER/BILIARY TREE:  Unremarkable  GALLBLADDER:  Removed  SPLEEN:  Unremarkable  PANCREAS:  Stable 2 cm cyst in the pancreatic tail  Stable 1 1 cm cyst in the pancreatic neck  ADRENAL GLANDS:  Unremarkable  KIDNEYS/URETERS:  Right hydroureteronephrosis resulting from a 6 mm calculus at the right UV junction  Left kidney unremarkable  STOMACH AND BOWEL:  There is colonic diverticulosis without evidence of acute diverticulitis  APPENDIX:  No findings to suggest appendicitis  ABDOMINOPELVIC CAVITY:  No ascites or free intraperitoneal air  No lymphadenopathy  VESSELS:  Atherosclerotic changes are present  Focal ectasia of the infrarenal abdominal aorta measuring maximally 2 6 cm  No evidence of aneurysm  PELVIS REPRODUCTIVE ORGANS:  Unremarkable for patient's age  URINARY BLADDER:  Unremarkable  ABDOMINAL WALL/INGUINAL REGIONS:  Unremarkable  OSSEOUS STRUCTURES:  No acute fracture or destructive osseous lesion  Impression: Right hydroureteronephrosis resulting from a 6 mm calculus at the right UV junction  Stable pancreatic cysts  Small right pleural effusion  Mild cardiomegaly  Colonic diverticulosis  Workstation performed: [id number]     Xr Chest 2 Views    Result Date: 2/16/2019  Narrative: CHEST INDICATION:   SIRS   COMPARISON:  Chest x-ray 12/4/2018 EXAM PERFORMED/VIEWS:  XR CHEST PA & LATERAL FINDINGS:  Left-sided chest wall pacemaker is identified  Pacemaker leads are intact  Right chest wall generator pack with leads extending into the neck unchanged  Cardiomediastinal silhouette appears unremarkable  The lungs are clear  No pneumothorax or pleural effusion  Osseous structures appear within normal limits for patient age  Impression: No acute cardiopulmonary disease  Workstation performed: [id number]       Medications   Reviewed    Total time spent with patient 45 minutes, >50% spent counseling and/or coordination of care  Case discussed in detail with LUC and JOLENE Garcia MD

## 2019-02-17 NOTE — ANESTHESIA POSTPROCEDURE EVALUATION
Post-Op Assessment Note    CV Status:  Stable    Pain management: adequate     Mental Status:  Alert and awake   Hydration Status:  Euvolemic   PONV Controlled:  Controlled   Airway Patency:  Patent   Post Op Vitals Reviewed: Yes      Staff: CRNA           BP   141/58   Temp   99 3   Pulse  110   Resp   15   SpO2   99%

## 2019-02-18 ENCOUNTER — APPOINTMENT (INPATIENT)
Dept: NON INVASIVE DIAGNOSTICS | Facility: HOSPITAL | Age: 79
DRG: 871 | End: 2019-02-18
Payer: COMMERCIAL

## 2019-02-18 LAB
ANION GAP SERPL CALCULATED.3IONS-SCNC: 12 MMOL/L (ref 4–13)
ATRIAL RATE: 86 BPM
BUN SERPL-MCNC: 24 MG/DL (ref 5–25)
CALCIUM SERPL-MCNC: 8.9 MG/DL (ref 8.3–10.1)
CHLORIDE SERPL-SCNC: 102 MMOL/L (ref 100–108)
CO2 SERPL-SCNC: 22 MMOL/L (ref 21–32)
CREAT SERPL-MCNC: 1.96 MG/DL (ref 0.6–1.3)
ERYTHROCYTE [DISTWIDTH] IN BLOOD BY AUTOMATED COUNT: 13.4 % (ref 11.6–15.1)
GFR SERPL CREATININE-BSD FRML MDRD: 31 ML/MIN/1.73SQ M
GLUCOSE SERPL-MCNC: 128 MG/DL (ref 65–140)
GLUCOSE SERPL-MCNC: 132 MG/DL (ref 65–140)
GLUCOSE SERPL-MCNC: 133 MG/DL (ref 65–140)
GLUCOSE SERPL-MCNC: 137 MG/DL (ref 65–140)
GLUCOSE SERPL-MCNC: 140 MG/DL (ref 65–140)
GLUCOSE SERPL-MCNC: 153 MG/DL (ref 65–140)
HCT VFR BLD AUTO: 37.2 % (ref 36.5–49.3)
HGB BLD-MCNC: 11.9 G/DL (ref 12–17)
LACTATE SERPL-SCNC: 2.1 MMOL/L (ref 0.5–2)
MCH RBC QN AUTO: 30.9 PG (ref 26.8–34.3)
MCHC RBC AUTO-ENTMCNC: 32 G/DL (ref 31.4–37.4)
MCV RBC AUTO: 97 FL (ref 82–98)
P AXIS: 70 DEGREES
PLATELET # BLD AUTO: 111 THOUSANDS/UL (ref 149–390)
PMV BLD AUTO: 9.6 FL (ref 8.9–12.7)
POTASSIUM SERPL-SCNC: 4.4 MMOL/L (ref 3.5–5.3)
PR INTERVAL: 162 MS
PROCALCITONIN SERPL-MCNC: 2.13 NG/ML
QRS AXIS: 103 DEGREES
QRSD INTERVAL: 134 MS
QT INTERVAL: 428 MS
QTC INTERVAL: 512 MS
RBC # BLD AUTO: 3.85 MILLION/UL (ref 3.88–5.62)
SODIUM SERPL-SCNC: 136 MMOL/L (ref 136–145)
T WAVE AXIS: -1 DEGREES
VENTRICULAR RATE: 86 BPM
WBC # BLD AUTO: 8.31 THOUSAND/UL (ref 4.31–10.16)

## 2019-02-18 PROCEDURE — 93306 TTE W/DOPPLER COMPLETE: CPT | Performed by: INTERNAL MEDICINE

## 2019-02-18 PROCEDURE — 87040 BLOOD CULTURE FOR BACTERIA: CPT | Performed by: PHYSICIAN ASSISTANT

## 2019-02-18 PROCEDURE — C8929 TTE W OR WO FOL WCON,DOPPLER: HCPCS

## 2019-02-18 PROCEDURE — 85027 COMPLETE CBC AUTOMATED: CPT | Performed by: NURSE PRACTITIONER

## 2019-02-18 PROCEDURE — 99233 SBSQ HOSP IP/OBS HIGH 50: CPT | Performed by: ANESTHESIOLOGY

## 2019-02-18 PROCEDURE — 93010 ELECTROCARDIOGRAM REPORT: CPT | Performed by: INTERNAL MEDICINE

## 2019-02-18 PROCEDURE — 92526 ORAL FUNCTION THERAPY: CPT

## 2019-02-18 PROCEDURE — 99232 SBSQ HOSP IP/OBS MODERATE 35: CPT | Performed by: NURSE PRACTITIONER

## 2019-02-18 PROCEDURE — 82948 REAGENT STRIP/BLOOD GLUCOSE: CPT

## 2019-02-18 PROCEDURE — 80048 BASIC METABOLIC PNL TOTAL CA: CPT | Performed by: NURSE PRACTITIONER

## 2019-02-18 PROCEDURE — 87081 CULTURE SCREEN ONLY: CPT | Performed by: PHYSICIAN ASSISTANT

## 2019-02-18 PROCEDURE — 92610 EVALUATE SWALLOWING FUNCTION: CPT

## 2019-02-18 PROCEDURE — 99223 1ST HOSP IP/OBS HIGH 75: CPT | Performed by: INTERNAL MEDICINE

## 2019-02-18 PROCEDURE — 84145 PROCALCITONIN (PCT): CPT | Performed by: NURSE PRACTITIONER

## 2019-02-18 PROCEDURE — 83605 ASSAY OF LACTIC ACID: CPT | Performed by: PHYSICIAN ASSISTANT

## 2019-02-18 PROCEDURE — 93005 ELECTROCARDIOGRAM TRACING: CPT

## 2019-02-18 RX ORDER — ALBUMIN, HUMAN INJ 5% 5 %
12.5 SOLUTION INTRAVENOUS ONCE
Status: COMPLETED | OUTPATIENT
Start: 2019-02-18 | End: 2019-02-18

## 2019-02-18 RX ORDER — SODIUM CHLORIDE, SODIUM GLUCONATE, SODIUM ACETATE, POTASSIUM CHLORIDE, MAGNESIUM CHLORIDE, SODIUM PHOSPHATE, DIBASIC, AND POTASSIUM PHOSPHATE .53; .5; .37; .037; .03; .012; .00082 G/100ML; G/100ML; G/100ML; G/100ML; G/100ML; G/100ML; G/100ML
50 INJECTION, SOLUTION INTRAVENOUS CONTINUOUS
Status: DISCONTINUED | OUTPATIENT
Start: 2019-02-18 | End: 2019-02-20

## 2019-02-18 RX ADMIN — CARBIDOPA AND LEVODOPA 1 TABLET: 25; 100 TABLET ORAL at 15:29

## 2019-02-18 RX ADMIN — PERFLUTREN 0.8 ML/MIN: 6.52 INJECTION, SUSPENSION INTRAVENOUS at 08:21

## 2019-02-18 RX ADMIN — HEPARIN SODIUM 5000 UNITS: 5000 INJECTION, SOLUTION INTRAVENOUS; SUBCUTANEOUS at 21:00

## 2019-02-18 RX ADMIN — VANCOMYCIN HYDROCHLORIDE 1500 MG: 1 INJECTION, POWDER, LYOPHILIZED, FOR SOLUTION INTRAVENOUS at 20:47

## 2019-02-18 RX ADMIN — SODIUM CHLORIDE, SODIUM GLUCONATE, SODIUM ACETATE, POTASSIUM CHLORIDE AND MAGNESIUM CHLORIDE 50 ML/HR: 526; 502; 368; 37; 30 INJECTION, SOLUTION INTRAVENOUS at 07:47

## 2019-02-18 RX ADMIN — CARBIDOPA AND LEVODOPA 1 TABLET: 25; 100 TABLET ORAL at 12:20

## 2019-02-18 RX ADMIN — HEPARIN SODIUM 5000 UNITS: 5000 INJECTION, SOLUTION INTRAVENOUS; SUBCUTANEOUS at 13:23

## 2019-02-18 RX ADMIN — ALBUMIN (HUMAN) 12.5 G: 12.5 SOLUTION INTRAVENOUS at 11:31

## 2019-02-18 RX ADMIN — Medication 2000 MG: at 15:57

## 2019-02-18 RX ADMIN — Medication 2000 MG: at 23:25

## 2019-02-18 RX ADMIN — HEPARIN SODIUM 5000 UNITS: 5000 INJECTION, SOLUTION INTRAVENOUS; SUBCUTANEOUS at 05:15

## 2019-02-18 RX ADMIN — SODIUM CHLORIDE 125 ML/HR: 0.9 INJECTION, SOLUTION INTRAVENOUS at 04:24

## 2019-02-18 NOTE — PROGRESS NOTES
Progress Note - Critical Care   Leticia Sweet  [de-identified] y o  male MRN: [id number]  Unit/Bed#:  Encounter: [id number]    Attending Physician: Savita Chowdary MD      ______________________________________________________________________  Assessment and Plan:   Principal Problem:    Sepsis [facility])  Active Problems:    Parkinson's disease with use of electrical brain stimulation ([facility] )    S/P deep brain stimulator placement    Weakness generalized    Type 2 diabetes mellitus ([facility] )    HTN (hypertension)    History of CVA (cerebrovascular accident)    Ambulatory dysfunction    Hydronephrosis with urinary obstruction due to renal calculus    Bacteremia  Resolved Problems:    * No resolved hospital problems  *        Neuro:   [facility] ICU  Sleep hygiene  Delirium precautions  Parkinson's-continue home medications, patient does have deep brain stimulator as well    CV:   Hypertension-home antihypertensives have been on hold and patient's blood pressure is stable, continue close hemodynamic monitoring    Pulm:   Encourage good pulmonary hygiene    GI:   NPO for now, speech consult pending for evaluation of swallowing    :   Right Hydronephrosis secondary to renal calculus-patient now status post perc nephrostomy-urology following, monitor renal indices and urine output closely    F/E/N:   Monitor lytes and replace as needed    ID:   Sepsis secondary to right hydronephrosis with renal calculi-now status post nephrostomy tube placement postop day 1-2/2 blood cultures positive for gram-positive cocci in pair-continue broad-spectrum antibiotics with cefepime, vancomycin added yesterday  Will await speciation  Check procalcitonin this morning      Heme:   Subcu heparin for DVT prophylaxis    Endo:   Diabetes-continue Accu-Cheks, sliding-scale insulin, and Lantus     Msk/Skin:   Stage I sacral ulcer-POA-continue local wound care, frequent turning and repositioning    Disposition:   Can be transferred back to [facility] Status: Level 3 - DNAR and DNI    ______________________________________________________________________    Chief Complaint:  Lethargy and incontinence    24 Hour Events:   Patient taken to interventional Radiology for right nephrostomy tube placement last evening  Postprocedure patient was febrile with T-max of 104 3°  Was transferred to step-down postprocedure for closer monitoring  Review of Systems   Constitutional: Negative for chills and fever  HENT: Negative  Eyes: Negative  Respiratory: Negative for cough, shortness of breath, wheezing and stridor  Cardiovascular: Negative for chest pain, palpitations and leg swelling  Gastrointestinal: Negative for abdominal distention, abdominal pain, blood in stool, diarrhea, nausea and vomiting  Endocrine: Negative  Genitourinary: Negative for dysuria, flank pain, frequency and urgency  Musculoskeletal: Negative  Skin: Negative for rash and wound  Allergic/Immunologic: Negative  Neurological: Negative for dizziness, syncope, facial asymmetry, weakness, light-headedness, numbness and headaches  Hematological: Negative for adenopathy  Does not bruise/bleed easily  Psychiatric/Behavioral: Negative       ______________________________________________________________________  Physical Exam   Constitutional: He is oriented to person, place, and time  He appears well-developed and well-nourished  No distress  HENT:   Head: Normocephalic and atraumatic  Eyes: Pupils are equal, round, and reactive to light  EOM are normal    Neck: Normal range of motion  Neck supple  No JVD present  Cardiovascular: Normal rate, regular rhythm, normal heart sounds and intact distal pulses  No murmur heard  Pulmonary/Chest: Effort normal and breath sounds normal  No respiratory distress  Abdominal: Soft  Bowel sounds are normal  He exhibits no distension  There is no tenderness     Genitourinary:   Genitourinary Comments: R nephrostomy tube in place Musculoskeletal: Normal range of motion  He exhibits no edema  Lymphadenopathy:     He has no cervical adenopathy  Neurological: He is alert and oriented to person, place, and time  No cranial nerve deficit  Skin: Skin is warm and dry  No rash noted  Stage I sacral ulcer   Psychiatric: He has a normal mood and affect  Nursing note and vitals reviewed  ______________________________________________________________________  Vitals:    19 2145 19 2200 19 2300 19 0000   BP:  104/57 (!) 81/46 100/59   BP Location:       Pulse: 80 81 74 60   Resp:  12   Temp: (!) 102 7 °F (39 3 °C)  (!) 100 9 °F (38 3 °C)    TempSrc: Rectal      SpO2: 94% 94% 97% 99%   Weight:       Height:           Temperature:   Temp (24hrs), Av 9 °F (38 3 °C), Min:97 8 °F (36 6 °C), Max:104 2 °F (40 1 °C)    Current Temperature: (!) 100 9 °F (38 3 °C)  Weights:   IBW: 75 3 kg    Body mass index is 26 07 kg/m²  Weight (last 2 days)     Date/Time   Weight    19 1947   84 8 (186 95)    19 1830   84 5 (186 29)    19 1247   86 3 (190 26)            Hemodynamic Monitoring:  N/A     Non-Invasive/Invasive Ventilation Settings:  Respiratory    Lab Data (Last 4 hours)    None         O2/Vent Data (Last 4 hours)    None              No results found for: PHART, JIY2REO, PO2ART, BDZ0RUX, Q7JAYMLB, BEART, SOURCE  SpO2: SpO2: 99 %  Intake and Outputs:  I/O        07 -  0700  07 -  0700    P  O   0    I V  (mL/kg) 500 (5 9) 3073 3 (36 2)    IV Piggyback 1500 280    Total Intake(mL/kg) 2000 (23 7) 3353 3 (39 5)    Urine (mL/kg/hr) 350 780 (0 4)    Total Output 350 780    Net +1650 +2573 3          Unmeasured Urine Occurrence 3 x           Nutrition:        Diet Orders   (From admission, onward)            Start     Ordered    19 0001  Diet NPO  Diet effective midnight     Question Answer Comment   Diet Type NPO    RD to adjust diet per protocol?  Yes        19 1436          Labs:   Results from last 7 days   Lab Units 02/17/19  1825 02/17/19  0458 02/16/19  1255   WBC Thousand/uL  --  7 14 13 37*   HEMOGLOBIN g/dL  --  10 5* 11 8*   I STAT HEMOGLOBIN g/dl 11 6*  --   --    HEMATOCRIT %  --  31 7* 35 5*   HEMATOCRIT, ISTAT % 34*  --   --    PLATELETS Thousands/uL  --  127* 161   NEUTROS PCT %  --   --  88*   MONOS PCT %  --   --  7     Results from last 7 days   Lab Units 02/17/19  1825 02/17/19  0458 02/16/19  1255   SODIUM mmol/L  --  137 135*   POTASSIUM mmol/L  --  4 1 4 7   CHLORIDE mmol/L  --  103 99*   CO2 mmol/L  --  24 23   CO2, I-STAT mmol/L 17*  --   --    ANION GAP mmol/L  --  10 13   BUN mg/dL  --  23 23   CREATININE mg/dL  --  2 03* 1 95*   CALCIUM mg/dL  --  8 5 9 3   ALT U/L  --   --  <6*   AST U/L  --   --  19   ALK PHOS U/L  --   --  86   ALBUMIN g/dL  --   --  2 9*   TOTAL BILIRUBIN mg/dL  --   --  2 40*          Results from last 7 days   Lab Units 02/17/19  1552   INR  1 10     Results from last 7 days   Lab Units 02/16/19  1430 02/16/19  1323   TROPONIN I ng/mL 0 02 0 02     Results from last 7 days   Lab Units 02/16/19  1323   LACTIC ACID mmol/L 3 3*     ABG:No results found for: PHART, YYD8ZWP, PO2ART, OFA8OEZ, I2WCCMJV, BEART, SOURCE  VBG:  Results from last 7 days   Lab Units 02/16/19  1456   PH GERALD  7 402*   PCO2 GERALD mm Hg 39 1*   PO2 GERALD mm Hg 25 9*   HCO3 GERALD mmol/L 23 8*   BASE EXC GERALD mmol/L -0 8     Results from last 7 days   Lab Units 02/17/19  1502   PROCALCITONIN ng/ml 0 81*     No results found for: [facility]   Imaging:   CT abdomen pelvis wo contrast   Final Result      Right hydroureteronephrosis resulting from a 6 mm calculus at the right UV junction  Stable pancreatic cysts  Small right pleural effusion  Mild cardiomegaly  Colonic diverticulosis  Workstation performed: [id number]         XR chest 2 views   ED Interpretation   Right lower lung field with apparent infiltrate    Pt is at high risk for aspiration  Final Result      No acute cardiopulmonary disease  Workstation performed: [id number]         IR tube placement nephrostomy    (Results Pending)      I have personally reviewed pertinent reports  EKG: NSR  Micro:  Results from last 7 days   Lab Units 02/16/19  1415 02/16/19  1323   GRAM STAIN RESULT  Gram positive cocci in pairs and chains* Gram positive cocci in pairs and chains*     Allergies:    Allergies   Allergen Reactions    Clopidogrel Rash     PLAVIX     Medications:   Scheduled Meds:  Current Facility-Administered Medications:  acetaminophen 650 mg Rectal Q6H PRN VIVIAN Sofia    aspirin 81 mg Oral QPM VIVIAN Garcia    atorvastatin 80 mg Oral QPM VIVIAN Garcia    carbidopa-levodopa 1 tablet Oral HS VIVIAN Garcia    carbidopa-levodopa 1 tablet Oral TID VIVIAN Garcia    cefepime 2,000 mg Intravenous Q24H VIVIAN Garcia Last Rate: 2,000 mg (02/17/19 1454)   chlorhexidine 15 mL [employer]it Q12H [address] VIVIAN Garcia    vitamin B-12 100 mcg Oral Daily VIVIAN Garcia    heparin (porcine) 5,000 Units Subcutaneous [facility] VIVIAN Garcia    insulin glargine 20 Units Subcutaneous HS VIVIAN Garcia    insulin lispro 1-5 Units Subcutaneous TID AC VIVIAN Garcia    insulin lispro 1-5 Units Subcutaneous HS VIVIAN Garcia    metoprolol tartrate 12 5 mg Oral BID VIIVAN Garcia    ondansetron 4 mg Intravenous Q6H PRN VIVIAN Garcia    rOPINIRole 2 mg Oral HS VIVIAN Garcia    sodium chloride 125 mL/hr Intravenous Continuous VIVIAN Garcia Last Rate: 125 mL/hr (02/17/19 1525)   vancomycin 1,500 mg Intravenous Q24H VIVIAN Garcia Last Rate: Stopped (02/18/19 0040)     Continuous Infusions:  sodium chloride 125 mL/hr Last Rate: 125 mL/hr (02/17/19 1525)     PRN Meds:    acetaminophen 650 mg Q6H PRN   ondansetron 4 mg Q6H PRN     VTE Pharmacologic Prophylaxis: Heparin  VTE Mechanical Prophylaxis: sequential compression device  Invasive lines and devices: Invasive Devices     Peripheral Intravenous Line            Peripheral IV 02/17/19 Left Antecubital less than 1 day    Peripheral IV 02/17/19 Right Forearm less than 1 day          Drain            Percutaneous Nephroureteral Tube (PCNU) Right 1 8 Fr 24 Cm less than 1 day    Urethral Catheter 16 Fr  less than 1 day                     Portions of the record may have been created with voice recognition software  Occasional wrong word or "sound a like" substitutions may have occurred due to the inherent limitations of voice recognition software  Read the chart carefully and recognize, using context, where substitutions have occurred      Aung Olea

## 2019-02-18 NOTE — PLAN OF CARE
Problem: DISCHARGE PLANNING - CARE MANAGEMENT  Goal: Discharge to post-acute care or home with appropriate resources  Description  INTERVENTIONS:  - Conduct assessment to determine patient/family and health care team treatment goals, and need for post-acute services based on payer coverage, community resources, and patient preferences, and barriers to discharge  - Address psychosocial, clinical, and financial barriers to discharge as identified in assessment in conjunction with the patient/family and health care team  - Arrange appropriate level of post-acute services according to patient?s   needs and preference and payer coverage in collaboration with the physician and health care team  - Communicate with and update the patient/family, physician, and health care team regarding progress on the discharge plan  - Arrange appropriate transportation to post-acute venues  Outcome: Progressing  Note:   CM met with pt, wife Carla Mary, [employer], caregiver and daughter in [place] at bedside  Pt is a readmission  Ananda Alexander did not call PCP-Dr Swetha Tay prior to bringing him to the ER  He was not eating and she knew he had to be evaluated  Pt lives in a one story house with his wife-a ramp is present to enter the residence  Pt uses a rolling walker w/seat to ambulate  Ananda Alexander is employed by [employer] and provides assistance  Pt is allowed aides-7 1/2 hrs/wk [facility]  He also uses [facility] for PT/OT  Pt has been in [place] for STR and was recently  d/micki from there  Family was very unhappy with the care and feel like he was d/micki in worse condition than when he went in  Denies substance abuse or mental health issues  He uses [facility] and has no problem with his co-pays  He has an Advanced Directive and his POA's are his sons-Juvenal and Neftali Francescinthia  In between the times that the aides provide assistance, the family fills in    Ananda Alexander transports to all appointments    CM discussed d/c needs including STR, but Fela Gore explains that they promised pt that he would not have to go to a STR again and that her home would be renovating to accommodate his increasing needs  CM left message with [facility] to see if pt qualifies for more time and also put in a referral to [facility] to see if they could accommodate lg term abx therapy  CM department will continue to follow through hospitalization  CM reviewed discharge planning process including the following: identifying help at home, patient preference for discharge planning needs, pharmacy preference, and availability of treatment team to discuss questions or concerns patient and/or family may have regarding understanding medications and recognizing signs and symptoms once discharged  CM also encouraged patient to follow up with all recommended appointments after discharge  Patient advised of importance for patient and family to participate in managing patient?s medical well being  CM name and role reviewed  Discharge Checklist reviewed and CM will continue to monitor for progress toward discharge goals in nursing and provider rounds

## 2019-02-18 NOTE — PROGRESS NOTES
Vancomycin Assessment    Violetta Thacker  is a [de-identified] y o  male who is currently receiving vancomycin 1500 mg q24h for bacteremia     Relevant clinical data and objective history reviewed:  Creatinine   Date Value Ref Range Status   02/18/2019 1 96 (H) 0 60 - 1 30 mg/dL Final     Comment:     Standardized to IDMS reference method   02/17/2019 2 03 (H) 0 60 - 1 30 mg/dL Final     Comment:     Standardized to IDMS reference method   02/16/2019 1 95 (H) 0 60 - 1 30 mg/dL Final     Comment:     Standardized to IDMS reference method   10/01/2015 0 96 0 60 - 1 30 mg/dL Final     Comment:     Standardized to IDMS reference method   09/28/2015 1 00 0 60 - 1 30 mg/dL Final     Comment:     Standardized to IDMS reference method   09/24/2015 0 88 0 60 - 1 30 mg/dL Final     Comment:     Standardized to IDMS reference method     BP 97/55   Pulse 78   Temp 98 6 °F (37 °C)   Resp 16   Ht 5' 11" (1 803 m)   Wt 84 9 kg (187 lb 2 7 oz)   SpO2 99%   BMI 26 11 kg/m²   I/O last 3 completed shifts: In: 3353 3 [I V :3073 3; IV Piggyback:280]  Out: 1280 [BAEAI:9606]  Lab Results   Component Value Date/Time    BUN 24 02/18/2019 04:58 AM    BUN 10 10/01/2015 06:50 AM    WBC 8 31 02/18/2019 04:58 AM    WBC 6 06 10/01/2015 06:50 AM    HGB 11 9 (L) 02/18/2019 04:58 AM    HGB 12 9 10/01/2015 06:50 AM    HCT 37 2 02/18/2019 04:58 AM    HCT 38 7 10/01/2015 06:50 AM    MCV 97 02/18/2019 04:58 AM    MCV 92 10/01/2015 06:50 AM     (L) 02/18/2019 04:58 AM     10/01/2015 06:50 AM     Temp Readings from Last 3 Encounters:   02/18/19 98 6 °F (37 °C)   01/19/19 97 6 °F (36 4 °C) (Oral)   12/06/18 97 5 °F (36 4 °C) (Oral)     Vancomycin Days of Therapy: 2    Assessment/Plan  The patient is currently on vancomycin utilizing scheduled dosing based on actual body weight  Baseline risks associated with therapy include: pre-existing renal impairment and advanced age    The patient is currently receiving 1500 mg q24h and is clinically appropriate and dose will be continued  Pharmacy will also follow closely for s/sx of nephrotoxicity, infusion reactions and appropriateness of therapy  BMP and CBC will be ordered per protocol  Plan for trough as patient approaches steady state, prior to the 4th  dose at approximately 2/20/  Due to infection severity, will target a trough of 15-20 (appropriate for most indications)   Pharmacy will continue to follow the patient?s culture results and clinical progress daily      Warner Shrestha, Pharmacist

## 2019-02-18 NOTE — SPEECH THERAPY NOTE
Speech-Language Pathology Bedside Swallow Evaluation      Patient Name: Swathi Lua  Today's Date: 2/18/2019     Problem List  Patient Active Problem List   Diagnosis    Parkinson's disease with use of electrical brain stimulation ([facility] [address] )    S/P deep brain stimulator placement    Drooling    Dysphagia    Pancreatic cyst    Weakness generalized    Acute metabolic encephalopathy    Type 2 diabetes mellitus ([facility] [address] )    HTN (hypertension)    History of CVA (cerebrovascular accident)    Hypoglycemia    Concussion    Ambulatory dysfunction    Neurological movement disorder    Hydronephrosis with urinary obstruction due to renal calculus    Sepsis ([facility] [address] )    Bacteremia       Past Medical History  Past Medical History:   Diagnosis Date    Arthritis     BPH (benign prostatic hyperplasia)     Diabetes ([facility] [address] )     Heart problem     Movement disorder     Parkinson's disease ([facility] [address] [facility]     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     Stroke [facility])        Past Surgical History  Past Surgical History:   Procedure Laterality Date    APPENDECTOMY      CARDIAC DEFIBRILLATOR PLACEMENT      CARDIAC DEFIBRILLATOR PLACEMENT      CHOLECYSTECTOMY      CORONARY ANGIOPLASTY WITH STENT PLACEMENT      DEEP BRAIN STIMULATOR PLACEMENT      EAR SURGERY      GALLBLADDER SURGERY      KNEE SURGERY      SKIN CANCER EXCISION         Summary   Pt presented with a history of moderate oral/pharyngeal dysphagia per VBS of 11/1/2018 c recommendation for mechanical soft diet with nectar thick liquid & use of multiple strategies given multiple bouts of SILENT aspiration  Pt & family declined diet modification/thick liquids at that time  During hospitalization in Dec, clinical swallowing evaluation suggested s/s severe dysphagia reported    In January, clinical swallowing evaluation was suggestive moderate dysphagia & pt transferred to rehab center on mechanical soft diet (pt declined nectar thick liquid and has been on thin liquids)    Today, he presented c s/s SEVERE ORAL STAGE DYSPHAGIA c high risk for pharyngeal dysphagia and aspiration  RN & I were able to administer SInemet crushed c small amt of honey thick liquid c no s/s aspiration (when head supported)  If head not supported, pt c s/s aspiration c HTL  Given puree, pt essentially unable to transfer from mouth to pharynx  Family reports he has not taken his Parkinson medication for multiple days now)  Multiple family members were present for evaluation today (wife, 2 sons, 1 dtr, 1 dtr-in-law)>  Education re: my role & plan for continued evaluation/tx completed  Risk for Aspiration: significant at this time  Recommendations: NPO except medications     Recommended Form of Meds: crushed and place in honey thick liquid     Aspiration precautions and compensatory swallowing strategies: upright posture, only feed when fully alert, slow rate of feeding, liquids by teaspoon only and chin tuck          Current Medical Status per H & P  Lorena Riley  is a [de-identified] y o  male with past medical history of Parkinson's disease status post DBS, prior CVA, hypertension, hyperlipidemia who presents with generalized weakness, decreased appetite (he has not been eating and drinking over the last 1 day) and c/o right lower quadrant abdominal pain  DX: sepsis, generalized weakness, hydronephrosis c urinary obstructions 2* bacteremia  PD c DBS  Pt in IR this am for nephrostomy tube placement  Pt NPO pending SLP Swallowing Evaluation  Past medical history:  Please see H&P for details    Special Studies:  VBS 11/1/2018: moderate oral and pharyngeal dysphagia characterized by: profoundly impaired lingual retraction; frequent post-swallow accumulation of residuals; silent aspiration during and after the swallow with thin liquids; and passive laryngeal penetration of residuals after the swallow with other textures   Passive laryngeal penetration to the vocal folds was observed with puree and did result in an immediate cough  Strongly suspect that the patient has reduced oropharyngeal sensation  Incidental findings included intermittent reduced opening of the upper esophageal sphincter  2/16 CXR:  No acute disease     2/16 CT of abd/pelvis: Right hydroureteronephrosis resulting from a 6 mm calculus at the right UV junction  Stable pancreatic cysts  Small right pleural effusion  Mild cardiomegaly  Colonic diverticulosis      Social/Education/Vocational Hx:  Pt lives with family (returned home from rehab center ~2 weeks ago)      Swallow Information   Current Risks for Dysphagia & Aspiration: known history of dysphagia and inability to take PD meds the past few days     Current Diet: NPO      Baseline Diet: soft/level 3 diet and thin liquids      Baseline Assessment   Behavior/Cognition: lethargic    Speech/Language Status: able to follow commands inconsistently and limited verbal output    Patient Positioning: upright in bed    Pain Status/Interventions/Response to Interventions:  No report of or nonverbal indications of pain  Swallow Mechanism Exam   Facial: masked facies  Labial: bilateral decreased ROM and decreased strength  Lingual: bilateral decreased strength and decreased coordination  Velum: symmetrical  Mandible:  decreased ROM  Dentition: edentulous  Vocal quality:weak /mod to severely reduced volume  Volitional Cough: unable to initiate volitional cough     Consistencies Assessed and Performance   Consistencies Administered: nectar thick, honey thick and puree  Specific materials administered included 1 tsp nectar, 4 of HTL, 4 of puree    Oral Stage: severe, profound, decreased bolus propulsion, oral residue with all materials and suspected decreased control of honey & nectar thick liquids (control presumed to be improved with improved head support)    Pt was able to remove food from spoon but bolus formation was nearly absent    It appeared very difficult or impossible for the pt to initiate transfer at times  Much of the puree remained on tongue (& was removed via [place])  Thick liquids likely spilled over base of tongue (molly nectar & honey thick liquid when head was not slightly flexed via direct head support)  Pharyngeal Stage: significantly impaired, high risk at this time  +Delayed swallow initiation, fair rise  +Coughing on the only tsp of nectr thick liqud and on honey thick unless head was supported/slight neck flexion achieved  Esophageal Concerns: see previous reports    Strategies and Efficacy: head support appeared to improve oral control, pt previously trained in small bites/sips and NECK FLEXION    Summary and Recommendations (see above)    Results Reviewed with: patient, RN, CRNP and family     Treatment Recommended: yes    Frequency of treatment: daily until establishment of safe diet/nutritional plan    Patient Stated Goal: "ice cream"    Dysphagia Goals per SLP:  1   pt will tolerate puree with honey then nectar thick liquids without s/s of aspiration over time  2  pt will demonstrate accurate use of small controlled sips and neck flexion strategies with 80% accuracy given mincues  Pt/Family Education: initiated  Pt and caregivers would benefit from/require continued education

## 2019-02-18 NOTE — PROGRESS NOTES
Progress Note - Ursula Spear  [de-identified] y o  male MRN: [id number]    Unit/Bed#:  Encounter: [id number]      Assessment:  Ursula Spear  is an 57-year-old male with significant neurologic derangement including prior CVA and Parkinson's, admitted for fulminant sepsis secondary to 6 mm right UVJ calculus with resultant hydronephrosis  Postprocedure day 1 emergent IR insertion of right percutaneous nephrostomy  Patient remains in ICU for medical stabilization  Cultures are positive for gram-positive cocci 2/2  Urine culture still in progress  T-max 104 2°  However there is interval defervesced since and current temperature is 98 6°  Blood pressure stable  No requirement for vasopressor support as of yet  Patient is receiving empiric cefepime  Creatinine has trended downward from 2 0 3 yesterday to 1 96 today  Creatinine in December 2018 was approximately 1 5  Plan:  Maintain percutaneous nephrostomy to straight drainage and flush per IR protocol  Monitor intake and output  Continue medical optimization and empiric antimicrobials  Narrow per sensitivities  Consider infectious disease consultation for difficult antimicrobial selection and/or with evidence of multi drug resistant organism  Patient will require full recovery and sterilization prior to consideration for elective ureteroscopy  This will occur on an outpatient/ambulatory basis  For the time being, patient is maximally drained with PCN and will be discharged with drain in-situ until patient recovers for up to 3 months, if needed  Subjective:  Denies any complaints      Objective:     Vitals: Blood pressure 97/55, pulse 78, temperature 98 6 °F (37 °C), resp  rate 16, height 5' 11" (1 803 m), weight 84 9 kg (187 lb 2 7 oz), SpO2 99 %  ,Body mass index is 26 11 kg/m²        Intake/Output Summary (Last 24 hours) at 2/18/2019 0702  Last data filed at 2/18/2019 0501  Gross per 24 hour   Intake 3353 33 ml   Output 1180 ml   Net 2173 33 ml       Physical Exam: General appearance: alert, appears older than stated age, cooperative, pale, slowed mentation and syndromic appearance - Chronically ill-appearing  Head: Normocephalic, without obvious abnormality, atraumatic  Neck: no adenopathy, no carotid bruit, no JVD, supple, symmetrical, trachea midline and thyroid not enlarged, symmetric, no tenderness/mass/nodules  Lungs: diminished breath sounds  Heart: regular rate and rhythm, S1, S2 normal, no murmur, click, rub or gallop  Abdomen: soft, non-tender; bowel sounds normal; no masses,  no organomegaly  Extremities: extremities normal, warm and well-perfused; no cyanosis, clubbing, or edema and Hemiplegic  Pulses: 2+ and symmetric  Neurologic: Mental status: alertness: alert, orientation: person, place  Ellis patent for dark marlon colored urine     Invasive Devices     Peripheral Intravenous Line            Peripheral IV 02/17/19 Left Antecubital less than 1 day    Peripheral IV 02/17/19 Right Forearm less than 1 day          Drain            Percutaneous Nephroureteral Tube (PCNU) Right 1 8 Fr 24 Cm less than 1 day    Urethral Catheter 16 Fr  less than 1 day              Lab Results   Component Value Date    WBC 8 31 02/18/2019    HGB 11 9 (L) 02/18/2019    HCT 37 2 02/18/2019    MCV 97 02/18/2019     (L) 02/18/2019     Lab Results   Component Value Date    GLUCOSE 148 (H) 02/17/2019    CALCIUM 8 9 02/18/2019     10/01/2015    K 4 4 02/18/2019    CO2 22 02/18/2019     02/18/2019    BUN 24 02/18/2019    CREATININE 1 96 (H) 02/18/2019       Lab, Imaging and other studies: I have personally reviewed pertinent reports

## 2019-02-18 NOTE — WOUND OSTOMY CARE
Progress Note - Wound   Rogerio Lopes  [de-identified] y o  male MRN: [id number]  Unit/Bed#:  Encounter: [id number]      Assessment:  Wound care to see patient for nursing documentation of pressure injury noted on admission  Patient seen in bed, cooperative with assessment  Dependent for care  On critical care mattress  Assist with turning, wedges in use  Ellis for urine  Incontinent of bowel  B/l heels are intact with no redness or wounds noted  Recommend offloading and continued use of foam dressings for prevention  R buttock with stage 2 pressure injury  POA  Wound bed is 100% pink  Edges flat attached with scabbing  Brenda-wound is intact blanchable pink skin  Remainder of skin to buttocks/ sacrum is intact with blanchable pink skin  As moisture is currently managed recommend to use sacral foam dressing  No induration, fluctuance, redness, or purulence noted to the above noted wounds  New dressings applied  Patient tolerated well  Primary nurse aware of plan of care  See flow sheets for more detailed assessment findings  Will follow along  Plan: 1  Cleanse R buttock pressure injury with NSS, apply small sacral foam dressing  Change every 3 day and PRN for soilage/dislodgment  If patient becomes too incontinent, changing dressing more than 1 time per shift, please d/c and use calazime cream TID and PRN  2  Apply skin nourishing cream the entire skin daily for moisture  3  Turn and reposition patient every  2 hours   4  Elevate heels off of bed with pillows to offload pressure   5  Apply EHOB waffle cushion to chair when OOB, if able  6  Apply alleyvn foam dressings to b/l heels for prevention  Peel back and assess the skin every shift and PRN  Change every 3 days and PRN for soilage/dislodgement  7  Wound care will follow along with patient weekly, please call with questions and concerns    Objective:    Vitals: Blood pressure 97/55, pulse 78, temperature 98 6 °F (37 °C), resp   rate 16, height 5' 11" (1 803 m), weight 84 9 kg (187 lb 2 7 oz), SpO2 99 %  ,Body mass index is 26 11 kg/m²  Wound 02/16/19 Pressure Injury Buttocks Right (Active)   Wound Description Pink;Dry 2/18/2019  9:00 AM   Staging Stage II 2/18/2019  9:00 AM   Brenda-wound Assessment Dry; Intact 2/18/2019  9:00 AM   Wound Length (cm) 0 2 cm 2/18/2019  9:00 AM   Wound Width (cm) 0 2 cm 2/18/2019  9:00 AM   Wound Depth (cm) 0 1 2/18/2019  9:00 AM   Calculated Wound Area (cm^2) 0 04 cm^2 2/18/2019  9:00 AM   Calculated Wound Volume (cm^3) 0 cm^3 2/18/2019  9:00 AM   Tunneling 0 cm 2/18/2019  9:00 AM   Tunneling in depth located at 0 2/18/2019  9:00 AM   Undermining is depth extending from 0 2/18/2019  9:00 AM   Closure None 2/18/2019  9:00 AM   Drainage Amount None 2/18/2019  9:00 AM   Non-staged Wound Description Partial thickness 2/18/2019  9:00 AM   Treatments Cleansed;Site care;Elevated 2/18/2019  9:00 AM   Dressing Other (Comment) 2/18/2019  9:00 AM   Wound packed? No 2/18/2019  9:00 AM   Packing- # removed 0 2/18/2019  9:00 AM   Packing- # inserted 0 2/18/2019  9:00 AM   Dressing Changed New 2/18/2019  9:00 AM   Patient Tolerance Tolerated well 2/18/2019  9:00 AM   Dressing Status Clean;Dry; Intact 2/18/2019  9:00 AM       Recommendations written as orders  AVS updated    Jossie Mackenzie RN BSN [facility]

## 2019-02-18 NOTE — SOCIAL WORK
CM met with pt, wife Masha Eaton, caregiver and daughter in law-Gloria at bedside  Pt is a readmission  Perla Garcia did not call PCP-Dr Munira Marie prior to bringing him to the ER  He was not eating and she knew he had to be evaluated  Pt lives in a one story house with his wife-a ramp is present to enter the residence  Pt uses a rolling walker w/seat to ambulate  Perla Garcia is employed by [employer] and provides assistance  Pt is allowed aides-7 1/2 hrs/wk through the [facility]  He also uses [facility] for PT/OT  Pt has been in [facility] for STR and was recently  d/micki from there  Family was very unhappy with the care and feel like he was d/micki in worse condition than when he went in  Denies substance abuse or mental health issues  He uses [facility] and has no problem with his co-pays  He has an Advanced Directive and his POA's are his sons-Juvenal and Matt England  In between the times that the aides provide assistance, the family fills in    Perla Garcia transports to all appointments  CM discussed d/c needs including STR, but Perla Garcia explains that they promised pt that he would not have to go to a STR again and that her home would be renovating to accommodate his increasing needs  CM left message with [facility] to see if pt qualifies for more time and also put in a referral to [facility] to see if they could accommodate lg term abx therapy  CM department will continue to follow through hospitalization  CM reviewed discharge planning process including the following: identifying help at home, patient preference for discharge planning needs, pharmacy preference, and availability of treatment team to discuss questions or concerns patient and/or family may have regarding understanding medications and recognizing signs and symptoms once discharged  CM also encouraged patient to follow up with all recommended appointments after discharge   Patient advised of importance for patient and family to participate in managing patients medical well being  CM name and role reviewed  Discharge Checklist reviewed and CM will continue to monitor for progress toward discharge goals in nursing and provider rounds

## 2019-02-18 NOTE — CONSULTS
Consultation - Infectious Disease   Samantha Janes  [de-identified] y o  male MRN: [id number]  Unit/Bed#:  Encounter: [id number]    IMPRESSION & RECOMMENDATIONS:   1  Sepsis  POA  Fever, leukocytosis, lactic acidosis  Secondary to Enterococcus bacteremia  Also consider hydronephrosis with urinary obstruction due to renal calculus  Patient fever curve trended up last night but I suspect this was secondary to instrumentation and placement of the right nephroureterostomy tube  Since decompression patient has shown improved heart rate and temperature curve  Patient's blood and urine cultures are all showing Enterococcus  A TTE is ordered  Given these updated results I recommend we change patient's antibiotic regimen to IV vancomycin and ampicillin  Will deescalate further once culture results are finalized  -stop IV cefepime  -continue IV vancomycin  -consult pharmacy for guidance on vancomycin dosage  -start IV ampicillin, 2g q8 hours, dose adjusted for renal function  -check CBCD and BMP tomorrow  -follow up blood cultures  -follow up repeat blood cultures  -follow up urine cultures  -follow-up TTE  -monitor vitals  -supportive care    2  Enterococcus bacteremia  Likely secondary to hydronephrosis with urinary obstruction due to renal calculus  Repeat blood cultures are pending  TTE has been ordered  Fever curve is trending down this morning since decompression   -antibiotic as above  -check CBC and BMP tomorrow  -follow up blood cultures  -follow up repeat blood cultures  -follow-up TTE  -monitor vitals    3  Hydronephrosis with urinary obstruction due to renal calculus  Patient is now status post placement of right nephroureterostomy tube  He also has a Ellis catheter in place  Expect patient to show continued clinical improvement now that he is achieved decompression  He will still need a plan to deal with the kidney stone    He is following closely with Urology   -antibiotic as above  -check CBC and BMP tomorrow  -followup urine cultures  -monitor vitals  -monitor Ellis catheter  -monitor right nephroureterostomy tube  -monitor urine output  -monitor  symptoms  -continue close follow-up with Urology    4  Acute kidney injury  Patient's creatinine was 1 95 upon arrival to the ED  I reviewed patient's past medical records which shows a baseline creatinine of 1 1-1 2  Patient's creatinine did increase to 2 03 yesterday but has improved today to 1 96   -check BMP tomorrow  -dose adjust antibiotics renal function as needed    5  Type 2 diabetes mellitus  Patient's last hemoglobin A1c was 6 3% on 05/18/2018   -blood glucose management per primary service    6  Parkinson's disease   -patient continues on home medication  -management per primary service    Discussed in detail with critical care attending, Dr Saba Thomas  Discussed in detail with patient and his family at the bedside  Discussed in detail with daughter, Patricio Smyth, over the phone  HISTORY OF PRESENT ILLNESS:  Reason for Consult:  Bacteremia  HPI: Ricky Girard  is a [de-identified]y o  year old male who [facility] Department on 02/16/2019 after his family found him lethargic and weak  The patient had urinated on himself at home and was complaining of lower abdominal pain  He reports a history of urinary tract infections  Upon arrival to the ED the patient's temperature was 98 8° with a heart rate of 79  Within a few hours his temperature increased to 101 1°  Patient's white blood cell count was elevated at 13 37  His lactic acid was elevated 3 3  Patient's creatinine was 1 95 with a GFR of 32  Urinalysis performed in the ED which showed 2-4 RBC, 1-2 WBC, moderate epithelial cells, no bacteria, and small blood  The urine was sent for formal culture  Blood cultures were sent  He was taken for chest x-ray which was negative for cardiopulmonary disease    Dose of IV cefepime and admitted for additional medical management  After patient's admission he became less responsive and obtunded  He was found to be significantly dehydrated and IV fluids were started  CT of the abdomen and pelvis which showed right hydro ureteral nephrosis, a 6 mm calculus at the right UV junction, a small right pleural effusion, cardiomegaly, and colonic diverticulosis  Given his acute decline it was not recommended patient be scheduled for surgery but was instead sent to IR for placement of a right nephroureterostomy tube  Tube was placed in IR on 02/17/2019  Patient's procalcitonin was checked and was elevated 0 81  A Ellis catheter was also inserted  Patient's blood cultures revealed gram-positive cocci  His antibiotics were escalated from cefepime to cefepime and vancomycin  A TTE is ordered  Repeat blood cultures were drawn this morning  He continues to follow closely with Urology  We have been asked for formal consult for bacteremia  Patient has a past medical and surgical history significant for Parkinson's, stroke, heart disease, type 2 diabetes mellitus, BPH, ICD pacer combination, arthritis, former smoker, stage I sacral pressure ulcer, seborrheic keratosis, tremor, heart attack, malignant melanoma, deep brain stimulator, cholecystectomy, appendectomy, excision of skin cancer, knee surgery, ear surgery, and angioplasty with stenting  The patient has an allergy to Plavix  REVIEW OF SYSTEMS:  Patient is exhausted today and is minimally interactive  He does tell me he is feeling better  He is a little tender at the tube site but reports it is tolerable  He is not feeling nauseous  He is not feeling short of breath  Family at the bedside reports he is still more confused than normal but he is better than when he arrived to the hospital   A complete 12 point system-based review of systems is otherwise negative      PAST MEDICAL HISTORY:  Past Medical History:   Diagnosis Date    Arthritis     BPH (benign prostatic hyperplasia)     [facility]     Heart problem     Movement disorder     [facility]'s disease [facility]     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     Stroke [facility])      Past Surgical History:   Procedure Laterality Date    APPENDECTOMY      CARDIAC DEFIBRILLATOR PLACEMENT      CARDIAC DEFIBRILLATOR PLACEMENT      CHOLECYSTECTOMY      CORONARY ANGIOPLASTY WITH STENT PLACEMENT      DEEP BRAIN STIMULATOR PLACEMENT      EAR SURGERY      GALLBLADDER SURGERY      KNEE SURGERY      SKIN CANCER EXCISION       FAMILY HISTORY:  Non-contributory    SOCIAL HISTORY:  Social History   Social History     Substance and Sexual Activity   Alcohol Use Never    Frequency: Never     Social History     Substance and Sexual Activity   Drug Use No     Social History     Tobacco Use   Smoking Status Former Smoker   Smokeless Tobacco Never Used     ALLERGIES:  Allergies   Allergen Reactions    Clopidogrel Rash     PLAVIX     MEDICATIONS:  All current active medications have been reviewed      ANTIBIOTICS:  Cefepime - stop  Vancomycin 2  Ampicillin 1  Antibiotics 3    PHYSICAL EXAM:  Temp:  [97 8 °F (36 6 °C)-104 2 °F (40 1 °C)] 98 6 °F (37 °C)  HR:  [] 78  Resp:  [12-76] 16  BP: ()/(46-68) 97/55  SpO2:  [94 %-100 %] 99 %  Temp (24hrs), Av 8 °F (38 2 °C), Min:97 8 °F (36 6 °C), Max:104 2 °F (40 1 °C)  Current: Temperature: 98 6 °F (37 °C)    Intake/Output Summary (Last 24 hours) at 2019 0854  Last data filed at 2019 0501  Gross per 24 hour   Intake 3353 33 ml   Output 1180 ml   Net 2173 33 ml     General Appearance:  Appearing elderly, lethargic, minimally interactive,  in no acute distress   Head:  Normocephalic, without obvious abnormality, atraumatic   Eyes:  Conjunctiva pink and sclera anicteric, both eyes   Nose: Nares normal, mucosa normal, no drainage   Throat: Oropharynx moist without lesions   Neck: Supple, symmetrical, no adenopathy, no tenderness/mass/nodules   Back:   No CVA tenderness; right nephroureterostomy tube intact, is slightly tender at site, no drainage around tube, no spreading erythema, urine output is pena with small clots   Lungs:   Clear to auscultation bilaterally, respirations unlabored   Chest Wall:  No tenderness or deformity; left pacemaker intact without overlying erythema or edema, right pacer generator without overlying erythema or edema   Heart:  RRR; no murmur, rub or gallop   Abdomen:   Soft, non-tender, non-distended, positive bowel sounds    Genitourinary: Ellis catheter intact   Extremities: No cyanosis or clubbing, no edema   Skin: No rashes or lesions   Lymph nodes: Cervical, supraclavicular nodes normal   Neurologic: Alert and oriented times 2, is weak but does attempt to follow all commands     LABS, IMAGING, & OTHER STUDIES:  Lab Results:  I have personally reviewed pertinent labs  Results from last 7 days   Lab Units 02/18/19 0458 02/17/19 1825 02/17/19 0458 02/16/19  1255   WBC Thousand/uL 8 31  --  7 14 13 37*   HEMOGLOBIN g/dL 11 9*  --  10 5* 11 8*   I STAT HEMOGLOBIN g/dl  --  11 6*  --   --    PLATELETS Thousands/uL 111*  --  127* 161     Results from last 7 days   Lab Units 02/18/19 0458 02/17/19  1825 02/16/19  1255   POTASSIUM mmol/L 4 4  --    < > 4 7   CHLORIDE mmol/L 102  --    < > 99*   CO2 mmol/L 22  --    < > 23   CO2, I-STAT mmol/L  --  17*  --   --    BUN mg/dL 24  --    < > 23   CREATININE mg/dL 1 96*  --    < > 1 95*   EGFR ml/min/1 73sq m 31  --    < > 32   GLUCOSE, ISTAT mg/dl  --  148*  --   --    CALCIUM mg/dL 8 9  --    < > 9 3   AST U/L  --   --   --  19   ALT U/L  --   --   --  <6*   ALK PHOS U/L  --   --   --  86    < > = values in this interval not displayed       Results from last 7 days   Lab Units 02/16/19  1415 02/16/19  1323   GRAM STAIN RESULT  Gram positive cocci in pairs and chains* Gram positive cocci in pairs and chains*     Imaging Studies:   I have personally reviewed pertinent imaging study reports and images in PACS  CT ABDOMEN PELVIS WO CONTRAST 2/16/19  FINDINGS:  ABDOMEN  LOWER CHEST:  Calcified pleural plaques in the lung bases   Cardiomegaly   No pericardial effusion   Small right pleural effusion  LIVER/BILIARY TREE:  Unremarkable  GALLBLADDER:  Removed  SPLEEN:  Unremarkable  PANCREAS:  Stable 2 cm cyst in the pancreatic tail   Stable 1 1 cm cyst in the pancreatic neck  ADRENAL GLANDS:  Unremarkable  KIDNEYS/URETERS:  Right hydroureteronephrosis resulting from a 6 mm calculus at the right UV junction   Left kidney unremarkable  STOMACH AND BOWEL: Luis Fontan is colonic diverticulosis without evidence of acute diverticulitis  APPENDIX:  No findings to suggest appendicitis  ABDOMINOPELVIC CAVITY:  No ascites or free intraperitoneal air  No lymphadenopathy  VESSELS:  Atherosclerotic changes are present   Focal ectasia of the infrarenal abdominal aorta measuring maximally 2 6 cm   No evidence of aneurysm  PELVIS  REPRODUCTIVE ORGANS:  Unremarkable for patient's age  URINARY BLADDER:  Unremarkable  ABDOMINAL WALL/INGUINAL REGIONS:  Unremarkable  OSSEOUS STRUCTURES:  No acute fracture or destructive osseous lesion  Impression:     Right hydroureteronephrosis resulting from a 6 mm calculus at the right UV junction  Stable pancreatic cysts  Small right pleural effusion  Mild cardiomegaly  Colonic diverticulosis  XR CHEST 2 VIEWS 2/16/19  FINDINGS:  Left-sided chest wall pacemaker is identified   Pacemaker leads are intact  Right chest wall generator pack with leads extending into the neck unchanged  Cardiomediastinal silhouette appears unremarkable  The lungs are clear   No pneumothorax or pleural effusion  Osseous structures appear within normal limits for patient age  Impression:     No acute cardiopulmonary disease       Other Studies:   Repeat urine culture pending  Repeat blood cultures pending

## 2019-02-18 NOTE — DISCHARGE INSTR - OTHER ORDERS
1 Cleanse R buttock pressure injury with NSS, apply small sacral foam dressing  Change every 3 day and PRN for soilage/dislodgment  If patient becomes too incontinent, changing dressing more than 1 time per shift, please d/c and use calazime cream TID and PRN  2  Apply skin nourishing cream the entire skin daily for moisture  3  Turn and reposition patient every  2 hours   4  Elevate heels off of bed with pillows to offload pressure   5  Apply EHOB waffle cushion to chair when OOB, if able  6  Apply alleyvn foam dressings to b/l heels for prevention  Peel back and assess the skin every shift and PRN  Change every 3 days and PRN for soilage/dislodgement

## 2019-02-19 LAB
ACANTHOCYTES BLD QL SMEAR: PRESENT
BACTERIA BLD CULT: ABNORMAL
BACTERIA BLD CULT: ABNORMAL
BACTERIA UR CULT: ABNORMAL
BACTERIA UR CULT: ABNORMAL
BASOPHILS # BLD MANUAL: 0 THOUSAND/UL (ref 0–0.1)
BASOPHILS NFR MAR MANUAL: 0 % (ref 0–1)
BURR CELLS BLD QL SMEAR: PRESENT
EOSINOPHIL # BLD MANUAL: 0 THOUSAND/UL (ref 0–0.4)
EOSINOPHIL NFR BLD MANUAL: 0 % (ref 0–6)
ERYTHROCYTE [DISTWIDTH] IN BLOOD BY AUTOMATED COUNT: 13.3 % (ref 11.6–15.1)
GLUCOSE SERPL-MCNC: 115 MG/DL (ref 65–140)
GLUCOSE SERPL-MCNC: 117 MG/DL (ref 65–140)
GLUCOSE SERPL-MCNC: 97 MG/DL (ref 65–140)
GLUCOSE SERPL-MCNC: 98 MG/DL (ref 65–140)
GRAM STN SPEC: ABNORMAL
GRAM STN SPEC: ABNORMAL
HCT VFR BLD AUTO: 26.8 % (ref 36.5–49.3)
HGB BLD-MCNC: 8.7 G/DL (ref 12–17)
LYMPHOCYTES # BLD AUTO: 0.42 THOUSAND/UL (ref 0.6–4.47)
LYMPHOCYTES # BLD AUTO: 12 % (ref 14–44)
MAGNESIUM SERPL-MCNC: 1.9 MG/DL (ref 1.6–2.6)
MCH RBC QN AUTO: 31.1 PG (ref 26.8–34.3)
MCHC RBC AUTO-ENTMCNC: 32.5 G/DL (ref 31.4–37.4)
MCV RBC AUTO: 96 FL (ref 82–98)
MONOCYTES # BLD AUTO: 0.46 THOUSAND/UL (ref 0–1.22)
MONOCYTES NFR BLD: 13 % (ref 4–12)
NEUTROPHILS # BLD MANUAL: 2.55 THOUSAND/UL (ref 1.85–7.62)
NEUTS SEG NFR BLD AUTO: 72 % (ref 43–75)
NRBC BLD AUTO-RTO: 0 /100 WBCS
PLATELET # BLD AUTO: 73 THOUSANDS/UL (ref 149–390)
PLATELET BLD QL SMEAR: ABNORMAL
PMV BLD AUTO: 9.7 FL (ref 8.9–12.7)
RBC # BLD AUTO: 2.8 MILLION/UL (ref 3.88–5.62)
TOTAL CELLS COUNTED SPEC: 100
VARIANT LYMPHS # BLD AUTO: 3 %
WBC # BLD AUTO: 3.54 THOUSAND/UL (ref 4.31–10.16)

## 2019-02-19 PROCEDURE — G8978 MOBILITY CURRENT STATUS: HCPCS

## 2019-02-19 PROCEDURE — G8979 MOBILITY GOAL STATUS: HCPCS

## 2019-02-19 PROCEDURE — 82948 REAGENT STRIP/BLOOD GLUCOSE: CPT

## 2019-02-19 PROCEDURE — 99233 SBSQ HOSP IP/OBS HIGH 50: CPT | Performed by: INTERNAL MEDICINE

## 2019-02-19 PROCEDURE — 85007 BL SMEAR W/DIFF WBC COUNT: CPT | Performed by: PHYSICIAN ASSISTANT

## 2019-02-19 PROCEDURE — 92526 ORAL FUNCTION THERAPY: CPT

## 2019-02-19 PROCEDURE — 83735 ASSAY OF MAGNESIUM: CPT | Performed by: PHYSICIAN ASSISTANT

## 2019-02-19 PROCEDURE — 99232 SBSQ HOSP IP/OBS MODERATE 35: CPT | Performed by: FAMILY MEDICINE

## 2019-02-19 PROCEDURE — 85027 COMPLETE CBC AUTOMATED: CPT | Performed by: PHYSICIAN ASSISTANT

## 2019-02-19 PROCEDURE — 99232 SBSQ HOSP IP/OBS MODERATE 35: CPT | Performed by: UROLOGY

## 2019-02-19 PROCEDURE — 97163 PT EVAL HIGH COMPLEX 45 MIN: CPT

## 2019-02-19 PROCEDURE — G8988 SELF CARE GOAL STATUS: HCPCS

## 2019-02-19 PROCEDURE — G8987 SELF CARE CURRENT STATUS: HCPCS

## 2019-02-19 PROCEDURE — 97167 OT EVAL HIGH COMPLEX 60 MIN: CPT

## 2019-02-19 RX ORDER — ROPINIROLE 0.25 MG/1
0.75 TABLET, FILM COATED ORAL 3 TIMES DAILY
Status: DISCONTINUED | OUTPATIENT
Start: 2019-02-19 | End: 2019-02-20 | Stop reason: SDUPTHER

## 2019-02-19 RX ADMIN — HEPARIN SODIUM 5000 UNITS: 5000 INJECTION, SOLUTION INTRAVENOUS; SUBCUTANEOUS at 05:25

## 2019-02-19 RX ADMIN — HEPARIN SODIUM 5000 UNITS: 5000 INJECTION, SOLUTION INTRAVENOUS; SUBCUTANEOUS at 14:48

## 2019-02-19 RX ADMIN — CHLORHEXIDINE GLUCONATE 0.12% ORAL RINSE 15 ML: 1.2 LIQUID ORAL at 09:20

## 2019-02-19 RX ADMIN — SODIUM CHLORIDE, SODIUM GLUCONATE, SODIUM ACETATE, POTASSIUM CHLORIDE AND MAGNESIUM CHLORIDE 50 ML/HR: 526; 502; 368; 37; 30 INJECTION, SOLUTION INTRAVENOUS at 04:27

## 2019-02-19 RX ADMIN — Medication 2000 MG: at 22:32

## 2019-02-19 RX ADMIN — Medication 2000 MG: at 16:18

## 2019-02-19 RX ADMIN — SODIUM CHLORIDE, SODIUM GLUCONATE, SODIUM ACETATE, POTASSIUM CHLORIDE AND MAGNESIUM CHLORIDE 50 ML/HR: 526; 502; 368; 37; 30 INJECTION, SOLUTION INTRAVENOUS at 22:32

## 2019-02-19 RX ADMIN — HEPARIN SODIUM 5000 UNITS: 5000 INJECTION, SOLUTION INTRAVENOUS; SUBCUTANEOUS at 21:53

## 2019-02-19 RX ADMIN — CARBIDOPA AND LEVODOPA 1 TABLET: 25; 100 TABLET ORAL at 15:03

## 2019-02-19 RX ADMIN — Medication 2000 MG: at 06:32

## 2019-02-19 NOTE — SOCIAL WORK
Chantel contacted [facility] - [facility] [phone] to find out if pt would qualify for more assistance in the home through the waiver program   CHANTEL left message with [facility] and will await call back  CHANTEL also spoke to son and daughter in law Angelo Cherry at [phone]  She again states that family does not want pt put into a SNF  [facility] will teach her how to do the abx that will be needed and the nurse from [employer] is a family friend and will be available for any time to provide assistance

## 2019-02-19 NOTE — PROGRESS NOTES
[facility] Progress Note  Patient: Aditya Morrell  [de-identified] y o  male   MRN: [id number]  PCP: Ann Perez MD  Unit/Bed#:  Encounter: [id number]  Date Of Visit: 19    Assessment:    Principal Problem:    Sepsis ([facility] )  Active Problems:    Parkinson's disease with use of electrical brain stimulation ([facility] )    S/P deep brain stimulator placement    Weakness generalized    Type 2 diabetes mellitus ([place] [address] )    HTN (hypertension)    History of CVA (cerebrovascular accident)    Ambulatory dysfunction    Hydronephrosis with urinary obstruction due to renal calculus    Bacteremia      Plan:    · Sepsis:  Secondary to urinary tract infection  Improving  Continue current IV antibiotic  · Bacteremia:  Enterococcus  Per Infectious Disease recommendation continue ampicillin  Vancomycin was D/C   · Dysphagia:  Continue NPO, follow-up speech recommendation  · Acute kidney injury:  Some improvement  Follow BMP  Status percutaneous nephrostomy by IR  · Diastolic dysfunction:  Appears to be euvolemic  Close monitoring  VTE Pharmacologic Prophylaxis:   Pharmacologic: Heparin  Mechanical VTE Prophylaxis in Place: Yes    Patient Centered Rounds: I have performed bedside rounds with nursing staff today  Discussions with Specialists or Other Care Team Provider:     Education and Discussions with Family / Patient: Patient and family daughter     Time Spent for Care: 20 minutes  More than 50% of total time spent on counseling and coordination of care as described above  Current Length of Stay: 3 day(s)    Current Patient Status: Inpatient   Certification Statement: The patient will continue to require additional inpatient hospital stay due to Above condition on IV antibiotic    Discharge Plan / Estimated Discharge Date:  Depends on clinical course to    Code Status: Level 3 - DNAR and DNI      Subjective:   Patient denies any distress, Patient was found to be oriented x1   Patient is still on Objective:     Vitals:   Temp (24hrs), Av 4 °F (36 9 °C), Min:97 6 °F (36 4 °C), Max:98 7 °F (37 1 °C)    Temp:  [97 6 °F (36 4 °C)-98 7 °F (37 1 °C)] 98 6 °F (37 °C)  HR:  [] 79  Resp:  [12-19] 14  BP: ()/(50-59) 119/59  SpO2:  [93 %-96 %] 96 %  Body mass index is 27 3 kg/m²  Input and Output Summary (last 24 hours): Intake/Output Summary (Last 24 hours) at 2019 1006  Last data filed at 2019 1000  Gross per 24 hour   Intake  17 ml   Output 1500 ml   Net 499 17 ml       Physical Exam:     Physical Exam   Constitutional: He has a sickly appearance  Cardiovascular: Regular rhythm, normal heart sounds and intact distal pulses  Exam reveals no gallop and no friction rub  No murmur heard  Pulmonary/Chest: No stridor  No respiratory distress  He has no wheezes  He has no rales  Abdominal: Soft  He exhibits no distension  There is no tenderness  There is no guarding  Genitourinary:   Genitourinary Comments: Ellis catheter in place   Musculoskeletal: He exhibits edema  Neurological: He is alert  Oriented x1   Skin: Skin is warm  Additional Data:     Labs:    Results from last 7 days   Lab Units 19  0431  19  1255   WBC Thousand/uL 3 54*   < > 13 37*   HEMOGLOBIN g/dL 8 7*   < > 11 8*   I STAT HEMOGLOBIN   --    < >  --    HEMATOCRIT % 26 8*   < > 35 5*   HEMATOCRIT, ISTAT   --    < >  --    PLATELETS Thousands/uL 73*   < > 161   NEUTROS PCT %  --   --  88*   LYMPHS PCT %  --   --  4*   LYMPHO PCT % 12*  --   --    MONOS PCT %  --   --  7   MONO PCT % 13*  --   --    EOS PCT % 0  --  0    < > = values in this interval not displayed       Results from last 7 days   Lab Units 19  0458 19  1825  19  1255   POTASSIUM mmol/L 4 4  --    < > 4 7   CHLORIDE mmol/L 102  --    < > 99*   CO2 mmol/L 22  --    < > 23   CO2, I-STAT mmol/L  --  17*  --   --    BUN mg/dL 24  --    < > 23   CREATININE mg/dL 1 96*  --    < > 1 95*   CALCIUM mg/dL 8 9  --    < > 9 3   ALK PHOS U/L  --   --   --  86   ALT U/L  --   --   --  <6*   AST U/L  --   --   --  19   GLUCOSE, ISTAT mg/dl  --  148*  --   --     < > = values in this interval not displayed  Results from last 7 days   Lab Units 02/17/19  1552   INR  1 10       * I Have Reviewed All Lab Data Listed Above  * Additional Pertinent Lab Tests Reviewed: All Labs Within Last 24 Hours Reviewed    Imaging:    Imaging Reports Reviewed Today Include:   Imaging Personally Reviewed by Myself Includes:    Recent Cultures (last 7 days):     Results from last 7 days   Lab Units 02/17/19  1728 02/16/19  1415 02/16/19  1323 02/16/19  1320   BLOOD CULTURE   --  Enterococcus faecalis* Enterococcus faecalis*  --    GRAM STAIN RESULT   --  Gram positive cocci in pairs and chains* Gram positive cocci in pairs and chains*  --    URINE CULTURE  >100,000 cfu/ml Enterococcus faecalis*  --   --  Culture results to follow         Last 24 Hours Medication List:     Current Facility-Administered Medications:  acetaminophen 650 mg Rectal Q6H PRN VIVIAN Joyner    [facility] 2[facility] Last Rate: Stopped (02/19/19 0800)   aspirin 81 mg Oral QPM VIVIAN Garcia    atorvastatin 80 mg Oral QPM VIVIAN Garcia    carbidopa-levodopa 1 tablet Oral HS VIVIAN Garcia    carbidopa-levodopa 1 tablet Oral TID VIVIAN Garcia    chlorhexidine 15 mL Swish & Spit Q12H [facility] [place] VIVIAN Landaverde    vitamin B-12 100 mcg Oral Daily VIVIAN Garcia    heparin (porcine) 5,000 Units Subcutaneous [facility] VIVIAN Garcia    insulin lispro 1-6 Units Subcutaneous Q6H [facility] Gordon Lennon PA-C    multi-electrolyte 50 mL/hr Intravenous Continuous [facility] [place] Last Rate: 50 mL/hr (02/19/19 0427)   ondansetron 4 mg Intravenous Q6H PRN VIVIAN Garcia    rOPINIRole 0 75 mg Oral TID VIVIAN Rudolph         Today, Patient Was Seen By: Gabriela Anderson MD    ** Please Note: This note has been constructed using a voice recognition system   **

## 2019-02-19 NOTE — PHYSICAL THERAPY NOTE
Physical Therapy Evaluation     Patient's Name: Aditya Morrell  Admitting Diagnosis  Weakness generalized [R53 1]  SIRS (systemic inflammatory response syndrome) ([facility]) [R65 10]  AIDA (acute kidney injury) ([facility] [address] ) [N17 9]    Problem List  Patient Active Problem List   Diagnosis    Parkinson's disease with use of electrical brain stimulation ([facility] )    S/P deep brain stimulator placement    Drooling    Dysphagia    Pancreatic cyst    Weakness generalized    Acute metabolic encephalopathy    Type 2 diabetes mellitus ([facility] )    HTN (hypertension)    History of CVA (cerebrovascular accident)    Hypoglycemia    Concussion    Ambulatory dysfunction    Neurological movement disorder    Hydronephrosis with urinary obstruction due to renal calculus    Sepsis ([facility] )    Bacteremia       Past Medical History  Past Medical History:   Diagnosis Date    Arthritis     BPH (benign prostatic hyperplasia)     Diabetes ([facility] )     Heart problem     Movement disorder     Parkinson's disease ([facility] )     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     Stroke [facility])        Past Surgical History  Past Surgical History:   Procedure Laterality Date    APPENDECTOMY      CARDIAC DEFIBRILLATOR PLACEMENT      CARDIAC DEFIBRILLATOR PLACEMENT      CHOLECYSTECTOMY      CORONARY ANGIOPLASTY WITH STENT PLACEMENT      DEEP BRAIN STIMULATOR PLACEMENT      EAR SURGERY      GALLBLADDER SURGERY      IR TUBE PLACEMENT NEPHROSTOMY  2/17/2019    KNEE SURGERY      SKIN CANCER EXCISION            02/19/19 1400   Note Type   Note type Eval only   Pain Assessment   Pain Assessment No/denies pain   Pain Score No Pain  (0/10 pre and post session)   Home Living   Type of Home House   Home Layout Performs ADLs on one level; Able to live on main level with bedroom/bathroom; Two level  (this home layout is his dt homes; KLAUS reports but unsure #)   Bathroom Shower/Tub Walk-in shower   Bathroom Toilet Standard Bathroom Equipment Grab bars in shower; Shower chair;Grab bars around toilet   [facility]  (rollator)   Prior Function   Level of [place] Needs assistance with ADLs and functional mobility; Needs assistance with IADLs   Lives With Spouse   Receives Help From Personal care attendant;Home health; Family  (pt has family A and private duty aide 7 1/2 hrs/wk; daily HH)   ADL Assistance Needs assistance   IADLs Needs assistance   Falls in the last 6 months 0   Vocational Retired  (robert)   Restrictions/Precautions   [place] Bearing Precautions Per Order No   Braces or Orthoses   (none per pt)   Other Precautions Chair Alarm;Multiple lines;Telemetry;Limb alert   General   Family/Caregiver Present No   Cognition   Overall Cognitive Status WFL   Arousal/Participation Alert   Attention Within functional limits   Orientation Level Oriented to person;Oriented to place;Oriented to time;Disoriented to situation   Memory Within functional limits   Following Commands Follows all commands and directions without difficulty   Comments pt agreeable to PT evaluation   RUE Assessment   RUE Assessment X  (defer to OT eval for comments)   LUE Assessment   LUE Assessment X  (defer to OT eval for comments)   RLE Assessment   RLE Assessment X  (grossly 3/5)   LLE Assessment   LLE Assessment X  (grossly 3/5)   Coordination   Movements are Fluid and Coordinated 1   Sensation WFL   Light Touch   RLE Light Touch Grossly intact   LLE Light Touch Grossly intact   Bed Mobility   Supine to Sit 2  Maximal assistance   Additional items Assist x 2;HOB elevated; Bedrails; Increased time required;Verbal cues   Transfers   Sit to Stand 3  Moderate assistance   Additional items Assist x 2; Increased time required;Verbal cues   Stand to Sit 3  Moderate assistance   Additional items Assist x 2; Increased time required;Verbal cues   Ambulation/Elevation   Gait pattern Decreased foot clearance; Forward Flexion; Short stride; Step to;Excessively slow   Gait Assistance 3  Moderate assist   Additional items Assist x 2;Verbal cues; Tactile cues   Assistive Device Rolling walker   Distance 2' from EOB to recliner   Balance   Static Sitting Fair   Dynamic Sitting Fair -   Static Standing Poor +   Dynamic Standing Poor   Ambulatory Poor   Endurance Deficit   Endurance Deficit Yes   Activity Tolerance   Activity Tolerance Patient limited by fatigue   Medical Staff Made Aware EULALIA Wilcox   Nurse Made Aware RN Jose Juan Ta verbalized pt appropriate to see, made aware of session outcome/recs   Assessment   Prognosis Good   Problem List Decreased strength;Decreased endurance; Impaired balance;Decreased mobility; Decreased safety awareness   Assessment Pt is [de-identified] y o  male seen for PT evaluation on 2/19/2019 s/p admit to [facility] on 2/16/2019 w/ Sepsis ([facility] )  Pt presents c with generalized weakness, decreased appetite  PT consulted to assess pt's functional mobility and d/c needs  Order placed for PT eval and tx, w/ activity as tolerated order  Performed at least 2 patient identifiers during session: Name and wristband  Comorbidities affecting pt's physical performance at time of assessment include: arthritis, BPH, diabetes, heart problem, movement disorder, Parkinson's disease, 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, stroke  PTA, pt was independent w/ all functional mobility w/ rollator, ambulates household distances and lives w/ wife in 2 level home (1st floor set up with ramped entrance)  Personal factors affecting pt at time of IE include: inaccessible home environment, ambulating w/ assistive device, inability to ambulate household distances, inability to navigate level surfaces w/o external assistance, limited home support, positive fall history and decreased initiation and engagement   Please find objective findings from PT assessment regarding body systems outlined above with impairments and limitations including weakness, impaired balance, decreased endurance, gait deviations, decreased activity tolerance, decreased safety awareness and fall risk, as well as mobility assessment (need for mod-max A x2 assist w/ all phases of mobility when usually ambulating independently and need for cueing for mobility technique)  The following objective measures performed on IE also reveal limitations: Barthel Index: 25/100 and Modified Whit: 4 (moderate/severe disability)  Pt's clinical presentation is currently unstable/unpredictable seen in pt's presentation of need for input for task focus and mobility technique and ongoing medical assessment  Pt to benefit from continued PT tx to address deficits as defined above and maximize level of functional independent mobility and consistency  From PT/mobility standpoint, recommendation at time of d/c would be STR pending progress in order to facilitate return to PLOF  Barriers to Discharge Inaccessible home environment;Decreased caregiver support   Goals   Patient Goals to get OOB   STG Expiration Date 03/01/19   Short Term Goal #1 In 7-10 days: Increase bilateral LE strength 1/2 grade to facilitate independent mobility, Perform all bed mobility tasks with min A of 1 to decrease caregiver burden, Perform all transfers with min A of 1 to improve independence, Ambulate > 50 ft  with least restrictive assistive device with min A of 1 w/o LOB and w/ normalized gait pattern 100% of the time, Increase all balance 1/2 grade to decrease risk for falls, Complete exercise program independently, Tolerate 4 hr OOB to faciliate upright tolerance, Improve Barthel Index score to 40 or greater to facilitate independence and PT provider will perform functional balance assessment to determine fall risk   Treatment Day 0   Plan   Treatment/Interventions Functional transfer training;LE strengthening/ROM; Therapeutic exercise; Endurance training;Patient/family training;Equipment eval/education; Bed mobility;Gait training;Spoke to nursing;Spoke to case management;Spoke to MD;OT   PT Frequency   (3-5x/wk)   Recommendation   Recommendation Short-term skilled PT   Equipment Recommended   (TBD)   PT - OK to Discharge Yes  (when medically cleared if to STR)   Modified Whit Scale   Modified [place] Scale 4   Barthel Index   Feeding 5   Bathing 0   Grooming Score 0   Dressing Score 0   Bladder Score 0   Bowels Score 10   Toilet Use Score 5   Transfers (Bed/Chair) Score 5   Mobility (Level Surface) Score 0   Stairs Score 0   Barthel Index Score 25         Ellis Ruano, PT, DPT

## 2019-02-19 NOTE — OCCUPATIONAL THERAPY NOTE
Occupational Therapy Evaluation      Violetta [place]     2/19/2019    Patient Active Problem List   Diagnosis    Parkinson's disease with use of electrical brain stimulation ([facility] )    S/P deep brain stimulator placement    Drooling    Dysphagia    Pancreatic cyst    Weakness generalized    Acute metabolic encephalopathy    Type 2 diabetes mellitus ([facility] )    HTN (hypertension)    History of CVA (cerebrovascular accident)    Hypoglycemia    Concussion    Ambulatory dysfunction    Neurological movement disorder    Hydronephrosis with urinary obstruction due to renal calculus    Sepsis ([facility] )    Bacteremia       Past Medical History:   Diagnosis Date    Arthritis     BPH (benign prostatic hyperplasia)     Diabetes ([facility] )     Heart problem     Movement disorder     Parkinson's disease ([facility] )     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     Stroke [facility])        Past Surgical History:   Procedure Laterality Date    APPENDECTOMY      CARDIAC DEFIBRILLATOR PLACEMENT      CARDIAC DEFIBRILLATOR PLACEMENT      CHOLECYSTECTOMY      CORONARY ANGIOPLASTY WITH STENT PLACEMENT      DEEP BRAIN STIMULATOR PLACEMENT      EAR SURGERY      GALLBLADDER SURGERY      IR TUBE PLACEMENT NEPHROSTOMY  2/17/2019    KNEE SURGERY      SKIN CANCER EXCISION        02/19/19 1125   Note Type   Note type Eval/Treat   Restrictions/Precautions   Weight Bearing Precautions Per Order No   Other Precautions Chair Alarm;Multiple lines;Telemetry;Limb alert   Pain Assessment   Pain Assessment No/denies pain   Pain Score No Pain   Home Living   Type of Home House   Home Layout Performs ADLs on one level; Able to live on main level with bedroom/bathroom; Two level  (this home layout is his [facility] homes; KLAUS reports but unsure #)   Bathroom Shower/Tub Walk-in shower   Bathroom Toilet Standard   Bathroom Equipment Grab bars in shower; Shower chair;Grab bars around toilet   Bathroom Accessibility Accessible   Home Equipment Walker  (rollator)   Prior Function   Level of [place] Needs assistance with ADLs and functional mobility; Needs assistance with IADLs   Lives With Spouse   Receives Help From Personal care attendant;Home health; Family  (pt has family A and private duty aide 7 1/2 hrs/wk; daily HH)   ADL Assistance Needs assistance   IADLs Needs assistance   Falls in the last 6 months 0   Vocational Retired  (robert)   Lifestyle   Autonomy Pt reports PLOF was A with ADLs, IADLS, and family transports   Reciprocal Relationships family   Psychosocial   Psychosocial (WDL) WDL   ADL   Eating Assistance 3  Moderate Assistance   Eating Deficit Increased time to complete;Supervision/safety;Setup   Grooming Assistance 3  Moderate Assistance   Grooming Deficit Setup;Steadying;Supervision/safety; Increased time to complete   UB Bathing Assistance 2  Maximal Assistance   UB Bathing Deficit Increased time to complete;Supervision/safety;Steadying;Setup   LB Bathing Assistance 2  Maximal Assistance   LB Bathing Deficit Setup;Steadying;Supervision/safety; Increased time to complete   UB Dressing Assistance 2  Maximal Assistance   UB Dressing Deficit Supervision/safety; Increased time to complete;Steadying;Setup   LB Dressing Assistance 2  Maximal Assistance   LB Dressing Deficit Setup;Steadying;Supervision/safety; Increased time to complete   Toileting Assistance  2  Maximal Assistance   Toileting Deficit Supervison/safety; Increased time to complete;Steadying;Setup   Functional Assistance 2  Maximal Assistance   Functional Deficit Increased time to complete;Supervision/safety;Steadying;Setup   Activity Tolerance   Activity Tolerance Patient limited by fatigue   Nurse Made Aware yes, spoke with pt's RN, Jazmin Garvin, who stated pt was appropriate for OT and made aware of outcomes   RUE Assessment   RUE Assessment X   RUE Overall AROM   R Shoulder Flexion 45   RUE Overall PROM   R Shoulder Flexion 90   LUE Assessment   LUE Assessment X   LUE Overall AROM   L Shoulder Flexion 45   LUE Overall PROM   L Shoulder Flexion 90   Hand Function   Gross Motor Coordination Impaired   Fine Motor Coordination Functional   Sensation   Light Touch No apparent deficits   Sharp/Dull No apparent deficits   Stereognosis No apparent deficits   Proprioception   Proprioception No apparent deficits   Vision-Basic Assessment   Current Vision No visual deficits   Vision - Complex Assessment   Ocular Range of Motion WFL   Perception   Inattention/Neglect Appears intact   Cognition   Overall Cognitive Status WFL   Arousal/Participation Alert;Arousable; Cooperative   Attention Within functional limits   Orientation Level Oriented to person;Oriented to place;Oriented to time;Disoriented to situation   Memory Within functional limits   Following Commands Follows all commands and directions without difficulty   Comments Pt was agreeable to OT Eval   Assessment   Limitation Decreased ADL status; Decreased UE strength;Decreased Safe judgement during ADL;Decreased cognition;Decreased endurance;Decreased UE ROM; Decreased self-care trans;Decreased high-level ADLs   Prognosis Good   Assessment Pt is a [de-identified] y o  male seen for OT evaluation s/p admit to [facility] on 2/16/2019 w/ Sepsis ([facility] )  Comorbidities affecting pt's functional performance at time of assessment include:CVA, DM, HTN and Parkinsons   Orders placed for OT evaluation and treatment and "act as marcella" order  Performed at least two patient identifiers during session including name and wristband  Personal factors affecting pt at time of IE include:steps to enter environment, limited home support, difficulty performing ADLS, difficulty performing IADLS , decreased initiation and engagement , financial barriers, health management  and environment  Prior to admission, pt reports A with ADLs, A with IADLs, and (-) driving    Upon evaluation: Pt requires max A with UB ADLs, max A with LB ADLs, and xfers/ functional mobility TBD  Limitations 2* the following deficits impacting occupational performance: weakness, decreased strength, decreased balance, decreased tolerance, impaired sequencing, impaired problem solving, decreased safety awareness, impaired interpersonal skills, decreased mobilty and environmental deficits  Pt to benefit from continued skilled OT tx while in the hospital to address deficits as defined above and maximize level of functional independence w ADL's and functional mobility  Occupational Performance areas to address include: bathing/shower, toilet hygiene, dressing, medication management, socialization, health maintenance, functional mobility, community mobility, clothing management, money management, household maintenance and social participation  From OT standpoint, recommendation at time of d/c would be STR  Plan   Treatment Interventions ADL retraining; Activityengagement; Energy conservation;Cardiac education;Continued evaluation; Compensatory technique education;Equipment evaluation/education;Patient/family training;UE strengthening/ROM; Functional transfer training; Endurance training   Goal Expiration Date 03/04/19   OT Frequency 3-5x/wk   Recommendation   OT Discharge Recommendation Short Term Rehab   OT - OK to Discharge No   Barthel Index   Feeding 5   Bathing 0   Grooming Score 0   Dressing Score 0   Bladder Score 0   Bowels Score 10   Toilet Use Score 5   Transfers (Bed/Chair) Score 5   Mobility (Level Surface) Score 0   Stairs Score 0   Barthel Index Score 25   Modified [place] Scale   Modified [place] Scale 5     Occupational Therapy goals: In 7-14 days:      1 - Patient will increase OOB/ sitting tolerance to 2-4 hours per day for increased participation in self care and leisure tasks with no s/s of exertion  2 - Patient will increase sitting tolerance at edge of bed to 20 minutes to complete UB ADLs @ min assist level      3 - Patient will verbalize and demonstrate weight shifting technique in bed and sitting position with 10% verbal cues    4 - Patient will complete self feeding task with set up assist    5 - Patient will complete rolling to R/L with use of side rail and min assist x1      6 - Patient will complete grooming task with set up assist      7 - Patient will increase standing tolerance time to 5 minutes with unilateral UE support to complete sink level ADLs@ min assist level    8 - Patient will follow 100% simple one step directives without verbal cues  9 - Patient will transfer bed to Chair / toilet with min assist with AD as indicated  10 - Patient will complete UB ADLs with set up assist    11 - Patient will complete LB ADLs with min assist with the use of adaptive equipment  12 - Patient will complete toileting hygiene with mod assist assist/ supervision for thoroughness  15 - Patient/ Family will demonstrate competency with UE Home Exercise Program     14 - Patient will complete Interest checklist to explore participation in play/ leisure tasks for increased satisfaction and quality of life        Jennifer Griffith, MS OTR/L

## 2019-02-19 NOTE — PROGRESS NOTES
Progress Note - Lorena Riley  [de-identified] y o  male MRN: [id number]    Unit/Bed#:  Encounter: [id number]      Assessment:  Lorena Riley  Is an 38-year-old comorbid male admitted for  sepsis secondary to enterococcus bacteremia with CT confirmed 6 mm right UVJ calculus and hydronephrosis status post emergent right percutaneous nephrostomy tube placement  Patient continues ampicillin per ID recommendations; appreciated  Patient is afebrile and stabilizing  Creatinine continues to trend downward  Plan:  Continue medical optimization and antibiosis per Infectious Disease  Maintain percutaneous nephrostomy tube to straight drainage and flush per IR protocol  No further  intervention during this hospital stay  Definitive stone treatment will occur once patient has demonstrated significant recovery and is infection free  Subjective:    Unable secondary to mental status    Objective:     Vitals: Blood pressure 113/58, pulse 76, temperature 98 7 °F (37 1 °C), temperature source Oral, resp  rate 16, height 5' 11" (1 803 m), weight 88 8 kg (195 lb 12 3 oz), SpO2 95 %  ,Body mass index is 27 3 kg/m²        Intake/Output Summary (Last 24 hours) at 2/19/2019 0645  Last data filed at 2/19/2019 0530  Gross per 24 hour   Intake 1710 83 ml   Output 1575 ml   Net 135 83 ml       Physical Exam: General appearance: appears older than stated age, cooperative, pale, slowed mentation and Chronically ill-appearing  Head: Normocephalic, without obvious abnormality, atraumatic  Neck: no adenopathy, no carotid bruit, no JVD, supple, symmetrical, trachea midline and thyroid not enlarged, symmetric, no tenderness/mass/nodules  Lungs: diminished breath sounds  Heart: regular rate and rhythm, S1, S2 normal, no murmur, click, rub or gallop  Abdomen: soft, non-tender; bowel sounds normal; no masses,  no organomegaly  Extremities: extremities normal, warm and well-perfused; no cyanosis, clubbing, or edema  Pulses: 2+ and symmetric  Neurologic: Grossly normal  Dark margarita urine     Invasive Devices     Peripheral Intravenous Line            Peripheral IV 02/17/19 Left Antecubital 1 day    Peripheral IV 02/17/19 Right Forearm 1 day          Drain            Percutaneous Nephroureteral Tube (PCNU) Right 1 8 Fr 24 Cm 1 day    Urethral Catheter 16 Fr  1 day              Lab Results   Component Value Date    WBC 3 54 (L) 02/19/2019    HGB 8 7 (L) 02/19/2019    HCT 26 8 (L) 02/19/2019    MCV 96 02/19/2019    PLT 73 (L) 02/19/2019     Lab Results   Component Value Date    GLUCOSE 148 (H) 02/17/2019    CALCIUM 8 9 02/18/2019     10/01/2015    K 4 4 02/18/2019    CO2 22 02/18/2019     02/18/2019    BUN 24 02/18/2019    CREATININE 1 96 (H) 02/18/2019       Lab, Imaging and other studies: I have personally reviewed pertinent reports

## 2019-02-19 NOTE — PROGRESS NOTES
Vancomycin IV Pharmacy-to-Dose Consultation    Wolfgang Paul  is a [de-identified] y o  male who is currently receiving Vancomycin IV with management by the Pharmacy Consult service  Assessment/Plan:  The patient was reviewed  Renal function is stable and no signs or symptoms of nephrotoxicity and/or infusion reactions were documented in the chart  Based on today?s assessment, continue current vancomycin (day # 3) dosing of 1500 mg q24h, with a plan for trough to be drawn at 1900 on 2/20/19  We will continue to follow the patient?s culture results and clinical progress daily      Whit Altman, Pharmacist

## 2019-02-19 NOTE — PLAN OF CARE
Problem: DISCHARGE PLANNING - CARE MANAGEMENT  Goal: Discharge to post-acute care or home with appropriate resources  Description  INTERVENTIONS:  - Conduct assessment to determine patient/family and health care team treatment goals, and need for post-acute services based on payer coverage, community resources, and patient preferences, and barriers to discharge  - Address psychosocial, clinical, and financial barriers to discharge as identified in assessment in conjunction with the patient/family and health care team  - Arrange appropriate level of post-acute services according to patient?s   needs and preference and payer coverage in collaboration with the physician and health care team  - Communicate with and update the patient/family, physician, and health care team regarding progress on the discharge plan  - Arrange appropriate transportation to post-acute venues  Outcome: Progressing  Note:   Chantel contacted [facility] A[facility]-[phone] to find out if pt would qualify for more assistance in the home through the waiver program   CHANTEL left message with Deepa and will await call back  CHANTEL also spoke to son and daughter in law Patricio Smyth at [phone]  She again states that family does not want pt put into a SNF  [facility] will teach her how to do the abx that will be needed and the nurse from [phone] [employer] is a family friend and will be available for any time to provide assistance

## 2019-02-19 NOTE — SPEECH THERAPY NOTE
Speech/Language Pathology Progress Note    Patient Name: Giulia Solomon  Today's Date: 2/19/2019     Subjective:  --  Objective:  Pt seen for dysphagia tx, assessing readiness for po meds and conservative diet  He was assessed c tsps of puree, honey & nectar thick liquid  Assessment:  Pt evidenced s/s significant/severe oral dysphagia with inconsistent oral closure, MINIMAL LINGUAL movement with attempts at lingual transfer & swallow initiation resulting in pooling of most material in anterior oral cavity or on tongue  Most material was removed via [address]  Plan/Recommendations: Will assess again in pm c hope that po medication can be administered (?if pt would benefit from short term NG for medication administration)

## 2019-02-19 NOTE — SPEECH THERAPY NOTE
Speech/Language Pathology Progress Note    Patient Name: Peg Le  Today's Date: 2/19/2019     Subjective:  "I thought you forgot about me "    Objective:  Pt seen for dysphagia session bedside  No family present  Pt OOB in [place] & fully alert c improved speech intelligibility noted immediately  Pt was assessed c tsps puree, 2ozs of honey tick liquid and 3ozs of nectar thick liquid  Pt c oral closure for 90% of tsps/sips, weak prolonged transfer and significant oral residue c puree but only mild to mod delayed transfer and min residue c nectar thick and honey thick liquid  I alerted RN Manager to improved oral skills  She obtained & crushed Sinemet for administration  Cough x1 noted c 3ozs nectar thick liquid  Assessment:  Oropharyngeal skills beginning to improve       Plan/Recommendations:  Consider allowing nectar thick water this pm (multiple placed in refrigerator) and attempt Sinemet crushed and in thick liquid for this pm   I will continue assessment/diagnostic tx in am

## 2019-02-19 NOTE — PLAN OF CARE
Problem: PHYSICAL THERAPY ADULT  Goal: Performs mobility at highest level of function for planned discharge setting  See evaluation for individualized goals  Description  Treatment/Interventions: Functional transfer training, LE strengthening/ROM, Therapeutic exercise, Endurance training, Patient/family training, Equipment eval/education, Bed mobility, Gait training, Spoke to nursing, Spoke to case management, Spoke to MD, OT  Equipment Recommended: (TBD)       See flowsheet documentation for full assessment, interventions and recommendations  Note:   Prognosis: Good  Problem List: Decreased strength, Decreased endurance, Impaired balance, Decreased mobility, Decreased safety awareness  Assessment: Pt is [de-identified] y o  male seen for PT evaluation on 2/19/2019 s/p admit to [place] on 2/16/2019 w/ Sepsis ([facility] [address] )  Pt presents c with generalized weakness, decreased appetite  PT consulted to assess pt's functional mobility and d/c needs  Order placed for PT eval and tx, w/ activity as tolerated order  Performed at least 2 patient identifiers during session: Name and wristband  Comorbidities affecting pt's physical performance at time of assessment include: arthritis, BPH, diabetes, heart problem, movement disorder, Parkinson's disease, 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, stroke  PTA, pt was independent w/ all functional mobility w/ rollator, ambulates household distances and lives w/ wife in 2 level home (1st floor set up with ramped entrance)  Personal factors affecting pt at time of IE include: inaccessible home environment, ambulating w/ assistive device, inability to ambulate household distances, inability to navigate level surfaces w/o external assistance, limited home support, positive fall history and decreased initiation and engagement   Please find objective findings from PT assessment regarding body systems outlined above with impairments and limitations including weakness, impaired balance, decreased endurance, gait deviations, decreased activity tolerance, decreased safety awareness and fall risk, as well as mobility assessment (need for mod-max A x2 assist w/ all phases of mobility when usually ambulating independently and need for cueing for mobility technique)  The following objective measures performed on IE also reveal limitations: Barthel Index: 25/100 and Modified Whit: 4 (moderate/severe disability)  Pt's clinical presentation is currently unstable/unpredictable seen in pt's presentation of need for input for task focus and mobility technique and ongoing medical assessment  Pt to benefit from continued PT tx to address deficits as defined above and maximize level of functional independent mobility and consistency  From PT/mobility standpoint, recommendation at time of d/c would be STR pending progress in order to facilitate return to PLOF  Barriers to Discharge: Inaccessible home environment, Decreased caregiver support     Recommendation: Short-term skilled PT     PT - OK to Discharge: Yes(when medically cleared if to STR)    See flowsheet documentation for full assessment

## 2019-02-19 NOTE — PLAN OF CARE
Problem: OCCUPATIONAL THERAPY ADULT  Goal: Performs self-care activities at highest level of function for planned discharge setting  See evaluation for individualized goals  Description  Treatment Interventions: ADL retraining, Activityengagement, Energy conservation, Cardiac education, Continued evaluation, Compensatory technique education, Equipment evaluation/education, Patient/family training, UE strengthening/ROM, Functional transfer training, Endurance training          See flowsheet documentation for full assessment, interventions and recommendations  Note:   Limitation: Decreased ADL status, Decreased UE strength, Decreased Safe judgement during ADL, Decreased cognition, Decreased endurance, Decreased UE ROM, Decreased self-care trans, Decreased high-level ADLs  Prognosis: Good  Assessment: Pt is a [de-identified] y o  male seen for OT evaluation s/p admit to [facility] on 2/16/2019 w/ Sepsis ([facility] [address] )  Comorbidities affecting pt's functional performance at time of assessment include:CVA, DM, HTN and Parkinsons   Orders placed for OT evaluation and treatment and "act as marcella" order  Performed at least two patient identifiers during session including name and wristband  Personal factors affecting pt at time of IE include:steps to enter environment, limited home support, difficulty performing ADLS, difficulty performing IADLS , decreased initiation and engagement , financial barriers, health management  and environment  Prior to admission, pt reports A with ADLs, A with IADLs, and (-) driving  Upon evaluation: Pt requires max A with UB ADLs, max A with LB ADLs, and xfers/ functional mobility TBD  Limitations 2* the following deficits impacting occupational performance: weakness, decreased strength, decreased balance, decreased tolerance, impaired sequencing, impaired problem solving, decreased safety awareness, impaired interpersonal skills, decreased mobilty and environmental deficits   Pt to benefit from continued skilled OT tx while in the hospital to address deficits as defined above and maximize level of functional independence w ADL's and functional mobility  Occupational Performance areas to address include: bathing/shower, toilet hygiene, dressing, medication management, socialization, health maintenance, functional mobility, community mobility, clothing management, money management, household maintenance and social participation  From OT standpoint, recommendation at time of d/c would be STR         OT Discharge Recommendation: Short Term Rehab  OT - OK to Discharge: No

## 2019-02-19 NOTE — PROGRESS NOTES
Progress Note - Infectious Disease   Roxana Schaumann  [de-identified] y o  male MRN: [id number]  Unit/Bed#:  Encounter: [id number]      Impression/Recommendations:  1  Septic shock, POA  Fever, leukocytosis, refractory hypotension  Due to # 2/3  Clinically improved, now off pressors  Rec:  · Continue antibiotics as below  · Follow temperatures closely  · Check CBC in AM  · Supportive care as per the primary service     2  Enterococcus bacteremia  Likely due to #3  Repeat blood cultures negative x24 hours  TTE technically difficult but negative for obvious vegetation  Rec:  · Continue ampicillin for now  · Discontinue vancomycin  · Follow up repeat blood cultures  · Will likely need 2 weeks IV antibiotics     3  Obstructive pyelonephritis  Secondary to right ureteral stone  Status post Ellis, PCN  Clinically improving  Rec:  · Continue antibiotics as above  · Continue Ellis and PCN per Urology with close follow-up ongoing     4  AIDA  Likely secondary to sepsis, obstruction  Slowly improving  Rec:  · Follow creatinine closely and dose-adjust antibiotics as indicated  · Check BMP in AM     5   Status post ICD  His repeat blood cultures negative and alternative source for enterococcal bacteremia, low suspicion for endocarditis or ICD infection  Rec:  · Continue antibiotics as above  · Follow up repeat blood cultures  · If repeat blood cultures remain positive may need IRMA  6   Parkinson's with history of CVA  Antibiotics:  Ampicillin # 2  Vancomycin # 3  Antibiotics # 3    Subjective:  Patient seen on AM rounds  Patient working with speech therapy  States that only hurts when he laughs  Offers no other complaints  24 Hour Events:  No documented fevers, chills, sweats, nausea, vomiting, or diarrhea      Objective:  Vitals:  Temp:  [97 6 °F (36 4 °C)-98 7 °F (37 1 °C)] 98 6 °F (37 °C)  HR:  [] 79  Resp:  [12-19] 14  BP: ()/(50-59) 119/59  SpO2:  [93 %-96 %] 96 %  Temp (24hrs), Av 4 °F (36 9 °C), Min:97 6 °F (36 4 °C), Max:98 7 °F (37 1 °C)  Current: Temperature: 98 6 °F (37 °C)    Physical Exam:   General:  No acute distress  Eyes:  Normal lids and conjunctivae  ENT:  Normal external ears and nose  Neck:  Neck symmetric with midline trachea  Pulmonary:  Normal respiratory effort without accessory muscle use  Cardiovascular:  Regular rate and rhythm; no peripheral edema  Gastrointestinal:  No tenderness or distention  Musculoskeletal:  No digital clubbing or cyanosis  Skin:  No visible rashes; No palpable nodules  Neurologic:  Sensation grossly intact to light touch, dysarthria  Psychiatric:  Alert and oriented; Normal mood  :  Ellis and right PCN with clear dark yellow urine    Lab Results:  I have personally reviewed pertinent labs  Results from last 7 days   Lab Units 19  1255   POTASSIUM mmol/L 4 4  --  4 1 4 7   CHLORIDE mmol/L 102  --  103 99*   CO2 mmol/L 22  --  24 23   CO2, I-STAT mmol/L  --  17*  --   --    BUN mg/dL 24  --  23 23   CREATININE mg/dL 1 96*  --  2 03* 1 95*   EGFR ml/min/1 73sq m 31  --  30 32   GLUCOSE, ISTAT mg/dl  --  148*  --   --    CALCIUM mg/dL 8 9  --  8 5 9 3   AST U/L  --   --   --  19   ALT U/L  --   --   --  <6*   ALK PHOS U/L  --   --   --  86     Results from last 7 days   Lab Units 19  0431 198 19  1825 19  0458   WBC Thousand/uL 3 54* 8 31  --  7 14   HEMOGLOBIN g/dL 8 7* 11 9*  --  10 5*   I STAT HEMOGLOBIN g/dl  --   --  11 6*  --    PLATELETS Thousands/uL 73* 111*  --  127*     Results from last 7 days   Lab Units 19  1728 19  1415 19  1323 19  1320   BLOOD CULTURE   --  Enterococcus faecalis* Enterococcus faecalis*  --    GRAM STAIN RESULT   --  Gram positive cocci in pairs and chains* Gram positive cocci in pairs and chains*  --    URINE CULTURE  >100,000 cfu/ml Enterococcus faecalis*  --   --  Culture results to follow         Imaging Studies:   I have personally reviewed pertinent imaging study reports and images in PACS  CTA/P right hydroureteronephrosis with 6 mm calculus at right UVJ    EKG, Pathology, and Other Studies:   I have personally reviewed pertinent reports

## 2019-02-20 LAB
ANION GAP SERPL CALCULATED.3IONS-SCNC: 14 MMOL/L (ref 4–13)
BASOPHILS # BLD AUTO: 0.02 THOUSANDS/ΜL (ref 0–0.1)
BASOPHILS NFR BLD AUTO: 1 % (ref 0–1)
BUN SERPL-MCNC: 25 MG/DL (ref 5–25)
CALCIUM SERPL-MCNC: 8.3 MG/DL (ref 8.3–10.1)
CHLORIDE SERPL-SCNC: 105 MMOL/L (ref 100–108)
CO2 SERPL-SCNC: 23 MMOL/L (ref 21–32)
CREAT SERPL-MCNC: 1.15 MG/DL (ref 0.6–1.3)
EOSINOPHIL # BLD AUTO: 0.03 THOUSAND/ΜL (ref 0–0.61)
EOSINOPHIL NFR BLD AUTO: 1 % (ref 0–6)
ERYTHROCYTE [DISTWIDTH] IN BLOOD BY AUTOMATED COUNT: 13.5 % (ref 11.6–15.1)
GFR SERPL CREATININE-BSD FRML MDRD: 60 ML/MIN/1.73SQ M
GLUCOSE SERPL-MCNC: 103 MG/DL (ref 65–140)
GLUCOSE SERPL-MCNC: 109 MG/DL (ref 65–140)
GLUCOSE SERPL-MCNC: 112 MG/DL (ref 65–140)
GLUCOSE SERPL-MCNC: 163 MG/DL (ref 65–140)
GLUCOSE SERPL-MCNC: 164 MG/DL (ref 65–140)
GLUCOSE SERPL-MCNC: 184 MG/DL (ref 65–140)
HCT VFR BLD AUTO: 31.3 % (ref 36.5–49.3)
HGB BLD-MCNC: 10.2 G/DL (ref 12–17)
IMM GRANULOCYTES # BLD AUTO: 0.02 THOUSAND/UL (ref 0–0.2)
IMM GRANULOCYTES NFR BLD AUTO: 1 % (ref 0–2)
LYMPHOCYTES # BLD AUTO: 0.89 THOUSANDS/ΜL (ref 0.6–4.47)
LYMPHOCYTES NFR BLD AUTO: 26 % (ref 14–44)
MCH RBC QN AUTO: 31.1 PG (ref 26.8–34.3)
MCHC RBC AUTO-ENTMCNC: 32.6 G/DL (ref 31.4–37.4)
MCV RBC AUTO: 95 FL (ref 82–98)
MONOCYTES # BLD AUTO: 0.66 THOUSAND/ΜL (ref 0.17–1.22)
MONOCYTES NFR BLD AUTO: 20 % (ref 4–12)
MRSA NOSE QL CULT: NORMAL
NEUTROPHILS # BLD AUTO: 1.76 THOUSANDS/ΜL (ref 1.85–7.62)
NEUTS SEG NFR BLD AUTO: 51 % (ref 43–75)
NRBC BLD AUTO-RTO: 0 /100 WBCS
PLATELET # BLD AUTO: 83 THOUSANDS/UL (ref 149–390)
PMV BLD AUTO: 10 FL (ref 8.9–12.7)
POTASSIUM SERPL-SCNC: 3.4 MMOL/L (ref 3.5–5.3)
RBC # BLD AUTO: 3.28 MILLION/UL (ref 3.88–5.62)
SODIUM SERPL-SCNC: 142 MMOL/L (ref 136–145)
WBC # BLD AUTO: 3.38 THOUSAND/UL (ref 4.31–10.16)

## 2019-02-20 PROCEDURE — 99233 SBSQ HOSP IP/OBS HIGH 50: CPT | Performed by: INTERNAL MEDICINE

## 2019-02-20 PROCEDURE — 92526 ORAL FUNCTION THERAPY: CPT

## 2019-02-20 PROCEDURE — 80048 BASIC METABOLIC PNL TOTAL CA: CPT | Performed by: PHYSICIAN ASSISTANT

## 2019-02-20 PROCEDURE — 82948 REAGENT STRIP/BLOOD GLUCOSE: CPT

## 2019-02-20 PROCEDURE — 99232 SBSQ HOSP IP/OBS MODERATE 35: CPT | Performed by: FAMILY MEDICINE

## 2019-02-20 PROCEDURE — 85025 COMPLETE CBC W/AUTO DIFF WBC: CPT | Performed by: FAMILY MEDICINE

## 2019-02-20 RX ORDER — ROPINIROLE 0.25 MG/1
0.75 TABLET, FILM COATED ORAL 3 TIMES DAILY
Status: DISCONTINUED | OUTPATIENT
Start: 2019-02-20 | End: 2019-02-22

## 2019-02-20 RX ADMIN — CARBIDOPA AND LEVODOPA 1 TABLET: 25; 100 TABLET ORAL at 10:05

## 2019-02-20 RX ADMIN — ROPINIROLE 0.75 MG: 0.25 TABLET, FILM COATED ORAL at 17:29

## 2019-02-20 RX ADMIN — CARBIDOPA AND LEVODOPA 1 TABLET: 25; 100 TABLET ORAL at 21:18

## 2019-02-20 RX ADMIN — CHLORHEXIDINE GLUCONATE 0.12% ORAL RINSE 15 ML: 1.2 LIQUID ORAL at 21:18

## 2019-02-20 RX ADMIN — HEPARIN SODIUM 5000 UNITS: 5000 INJECTION, SOLUTION INTRAVENOUS; SUBCUTANEOUS at 21:19

## 2019-02-20 RX ADMIN — CARBIDOPA AND LEVODOPA 1 TABLET: 25; 100 TABLET ORAL at 17:30

## 2019-02-20 RX ADMIN — ATORVASTATIN CALCIUM 80 MG: 40 TABLET, FILM COATED ORAL at 17:35

## 2019-02-20 RX ADMIN — ASPIRIN 81 MG 81 MG: 81 TABLET ORAL at 17:35

## 2019-02-20 RX ADMIN — ROPINIROLE 0.75 MG: 0.25 TABLET, FILM COATED ORAL at 10:04

## 2019-02-20 RX ADMIN — ROPINIROLE 0.75 MG: 0.25 TABLET, FILM COATED ORAL at 21:18

## 2019-02-20 RX ADMIN — HEPARIN SODIUM 5000 UNITS: 5000 INJECTION, SOLUTION INTRAVENOUS; SUBCUTANEOUS at 14:07

## 2019-02-20 RX ADMIN — Medication 2000 MG: at 17:29

## 2019-02-20 RX ADMIN — HEPARIN SODIUM 5000 UNITS: 5000 INJECTION, SOLUTION INTRAVENOUS; SUBCUTANEOUS at 06:09

## 2019-02-20 RX ADMIN — INSULIN LISPRO 1 UNITS: 100 INJECTION, SOLUTION INTRAVENOUS; SUBCUTANEOUS at 14:04

## 2019-02-20 NOTE — SPEECH THERAPY NOTE
Dysphagia f/u continued  Was informed of discussions between MD, dtr-in-law & pt   "Pros and Cons" of po diet vs short term NG & PEG reportedly discussed)  Dtr in law reported to me that she is unable to come to hospital to speak c pt today, but that she plans to visit tomorrow (she reported stating preference for PEG tube if s/s severe dysphagia persist)  Was informed that option of alternative nutrition was discussed again c pt & pt declined  Pt seen c small amt of NTL & "Magic Cup" (fortified ice cream)  I reviewed pt hx & feeding precautions c CNA (Jai Salcido)  Karina Alcantar had pt positioned upright c appropriate head support, fed pt at slow rate after swallow noted/laryngeal rise observed  Recommendation: consider nectar thick full liquid diet, apply precautions/feeding strategies of SLOW RATE, observe for swallow/aryngeal rise, allow time for 2* transfer/swallow given pt c bouts of significant oral residue, encourage cough if s/s "wet" vocal quality noted  YESI BARAJAS;      GA 40 weeks;      PMA: _____    Current Status: Adm from home at 4 d.o. with Tmax 101.6, Received NS x 2 in ED.  Presumed sepsis.  Hyperbilirubinemia.  Likely decreased hydration on admission secondary to breastfeeding.      Age:  3  Weight: 3228 (-32)   [BW 3483, down 7%]   Intake: 89 (partial)  Urine output:  0.55                               Stools:   x3  FEN:  EHM/SA + BF PO ad caprice q3.  D10 1/4 NS @ 90-->wean to 7 cc/hr, and then off if DS stable.  Monitor PO intake.      RESP:  Comfortable in RA.  CV: No current issues.   HEME: 6/29:  7.5/49/307, [id number].  I/T=0.07.  O+/O+/C-.  Bilirubin 14.8-->12.4, flu in AM.      ID: Presumed sepsis. Ampi/cefepime pending cx.  Blood, CSF, urine cx NGTD.  UA:  small leuk esterase, 10-25 WBC.   CSF:  6W ([id number]), 2975 R.  CSF PCR for bacteria and HSV:  neg. ALT//26.       NEURO: Normal exam for GA. HC:  THERMO:  crib  Meds:  Ampi/cefepime, acyclovir  PLANS:  Monitor hydration/feeds and wean off IVF as eddy.  Ampi/cef pending cx.  Start empiric acyclovir pending surface HSV PCR.  Send blood HSV PCR.       Labs:  Bili, lytes in AM. YESI BARAJAS;      GA 40 weeks;      PMA: _____    Current Status: Adm from home at 4 d.o. with Tmax 101.6, Received NS x 2 in ED.  Presumed sepsis.  Hyperbilirubinemia.  Likely decreased hydration on admission secondary to breastfeeding.      Age:  4  Weight:  3264 (+36)  Intake: 173  Urine output:  x8                              Stools:   x3  FEN:  EHM/SA ad caprice, taking 35-60 q3.  Feeding well.      RESP:  Comfortable in RA.  CV: No current issues.   HEME: 6/29:  7.5/49/307, [id number].  I/T=0.07.  O+/O+/C-.  Bilirubin 13.2, f/u in AM.      ID: Presumed sepsis. Ampi/cefepime-->d/c after cx NG for 48 hrs.  Blood, CSF, urine cx NGTD.  UA:  small leuk esterase, 10-25 WBC.   CSF:  6W ([id number]), 2975 R.  CSF PCR for bacteria and HSV:  neg. ALT//26.  HSV blood and surface PCR sent 6/30.  Continue empiric acyclovir pending surface cx.        NEURO: Normal exam for GA.   THERMO:  crib  Meds:  Ampi/cefepime, acyclovir  PLANS:  Monitor clinical status.  D/c ampi/cef if cx NG x 48 hrs.  Continue acyclovir pending surface HSV PCR, and f/u blood HSV PCR.         Labs:  Bili in AM.

## 2019-02-20 NOTE — PROGRESS NOTES
Progress Note - Infectious Disease   Samuel Salgado  [de-identified] y o  male MRN: [id number]  Unit/Bed#: -01 Encounter: [id number]      Impression/Recommendations:  1  Septic shock, POA  Fever, leukocytosis, refractory hypotension  Due to # 2/3  Clinically improved, now off pressors  Rec:  ? Continue antibiotics as below  ? Follow temperatures closely  ? Supportive care as per the primary service     2  Enterococcus bacteremia  Likely due to #3  Repeat blood cultures negative x48 hours  TTE technically difficult but negative for obvious vegetation  Rec:  ? Continue ampicillin for now  ? Follow up repeat blood cultures  ? Will need 2 weeks IV antibiotics through 3/2/19  ? Check weekly CBC with diff and creatinine while on IV antibiotics  ? OK from ID perspective for PICC line placement at any time     3  Obstructive Enterococcus pyelonephritis  Secondary to right ureteral stone  Status post Ellis, PCN  Clinically improving  Rec:  ? Continue antibiotics as above  ? Continue Ellis and PCN per Urology with close follow-up ongoing     4  AIDA  Likely secondary to sepsis, obstruction  Slowly improving  Rec:  ? Follow creatinine closely and dose-adjust antibiotics as indicated  ? Check BMP in AM     5   Status post ICD  His repeat blood cultures negative and alternative source for enterococcal bacteremia, low suspicion for endocarditis or ICD infection  Rec:  ? Continue antibiotics as above  ? Follow up repeat blood cultures  ? If repeat blood cultures remain positive may need IRMA         6  Parkinson's with history of CVA  7   Leukopenia  Worsening  Consider residual effect of sepsis versus medications  No charmaine neutropenia  Rec:  · Check CBC in a m      Discussed in detail with Dr Prince Bond  Antibiotics:  Ampicillin #3  Antibiotics #4    Subjective:  Patient seen on AM rounds  Denies fevers, chills, sweats, nausea, vomiting, or diarrhea      24 Hour Events:  No documented fevers, chills, sweats, nausea, vomiting, or diarrhea  Objective:  Vitals:  Temp:  [98 42 °F (36 9 °C)-98 6 °F (37 °C)] 98 42 °F (36 9 °C)  HR:  [66-79] 66  Resp:  [16] 16  BP: (133-137)/(75-78) 137/78  SpO2:  [94 %-97 %] 94 %  Temp (24hrs), Av 5 °F (36 9 °C), Min:98 42 °F (36 9 °C), Max:98 6 °F (37 °C)  Current: Temperature: 98 42 °F (36 9 °C)    Physical Exam:   General:  No acute distress  Eyes:  Normal lids and conjunctivae  ENT:  Normal external ears and nose  Neck:  Neck symmetric with midline trachea  Pulmonary:  Normal respiratory effort without accessory muscle use  Cardiovascular:  Regular rate and rhythm; no peripheral edema  Gastrointestinal:  No tenderness or distention  Musculoskeletal:  No digital clubbing or cyanosis  Skin:  No visible rashes; No palpable nodules  Neurologic:  Sensation grossly intact to light touch  Psychiatric:  Alert and oriented; Normal mood  :  Ellis and right PCN bag with clear dark yellow urine    Lab Results:  I have personally reviewed pertinent labs  Results from last 7 days   Lab Units 19  0649 19  0458 19  1825 19  0458 19  1255   POTASSIUM mmol/L 3 4* 4 4  --  4 1 4 7   CHLORIDE mmol/L 105 102  --  103 99*   CO2 mmol/L 23 22  --  24 23   CO2, I-STAT mmol/L  --   --  17*  --   --    BUN mg/dL 25 24  --  23 23   CREATININE mg/dL 1 15 1 96*  --  2 03* 1 95*   EGFR ml/min/1 73sq m 60 31  --  30 32   GLUCOSE, ISTAT mg/dl  --   --  148*  --   --    CALCIUM mg/dL 8 3 8 9  --  8 5 9 3   AST U/L  --   --   --   --  19   ALT U/L  --   --   --   --  <6*   ALK PHOS U/L  --   --   --   --  86     Results from last 7 days   Lab Units 19  0649 19  0431 19  0458   WBC Thousand/uL 3 38* 3 54* 8 31   HEMOGLOBIN g/dL 10 2* 8 7* 11 9*   PLATELETS Thousands/uL 83* 73* 111*     Results from last 7 days   Lab Units 19  0744 19  1728 19  1415 19  1323 19  1320   BLOOD CULTURE  No Growth at 24 hrs  No Growth at 24 hrs    -- Enterococcus faecalis* Enterococcus faecalis*  --    GRAM STAIN RESULT   --   --  Gram positive cocci in pairs and chains* Gram positive cocci in pairs and chains*  --    URINE CULTURE   --  >100,000 cfu/ml Enterococcus faecalis*  --   --  60,000-69,000 cfu/ml Enterococcus faecalis*       Imaging Studies:   I have personally reviewed pertinent imaging study reports and images in PACS  EKG, Pathology, and Other Studies:   I have personally reviewed pertinent reports

## 2019-02-20 NOTE — PROGRESS NOTES
[facility] Progress Note  Patient: Leo Deleon  [de-identified] y o  male   MRN: [id number]  PCP: César Pena MD  Unit/Bed#: -01 Encounter: [id number]  Date Of Visit: 02/20/19    Assessment:    Principal Problem:    Sepsis ([facility] )  Active Problems:    Parkinson's disease with use of electrical brain stimulation ([facility] )    S/P deep brain stimulator placement    Weakness generalized    Type 2 diabetes mellitus ([place] [id number] )    HTN (hypertension)    History of CVA (cerebrovascular accident)    Ambulatory dysfunction    Hydronephrosis with urinary obstruction due to renal calculus    Bacteremia      Plan:      · Sepsis:  Secondary to urinary tract infection  Improving  Continue current IV antibiotic  · Bacteremia:  Enterococcus  Per Infectious Disease recommendation continue ampicillin  For 2 more weeks  Vancomycin was D/C  yesterday  · Dysphagia:  Continue NPO, follow-up speech recommendation  IR consult for PEG tube will be place after daughter talk to the patient   · Acute kidney injury:  Improving/   Follow BMP  Status percutaneous nephrostomy by IR  I am going to hold IVF due swollen  · Diastolic dysfunction:  Appears to be euvolemic  Close monitoring  VTE Pharmacologic Prophylaxis:   Pharmacologic: Heparin  Mechanical VTE Prophylaxis in Place: Yes    Patient Centered Rounds: I have performed bedside rounds with nursing staff today  Discussions with Specialists or Other Care Team Provider:     Education and Discussions with Family / Patient: patient     Time Spent for Care: 20 minutes  More than 50% of total time spent on counseling and coordination of care as described above  Current Length of Stay: 4 day(s)    Current Patient Status: Inpatient   Certification Statement: The patient will continue to require additional inpatient hospital stay due to Acute above condition    Discharge Plan / Estimated Discharge Date:  Depends on clinical course      Code Status: Level 3 - DNAR and DNI      Subjective:   Patient reported that he is doing better today  Objective:     Vitals:   Temp (24hrs), Av 5 °F (36 9 °C), Min:98 42 °F (36 9 °C), Max:98 6 °F (37 °C)    Temp:  [98 42 °F (36 9 °C)-98 6 °F (37 °C)] 98 42 °F (36 9 °C)  HR:  [66-79] 66  Resp:  [16] 16  BP: (133-137)/(75-78) 137/78  SpO2:  [94 %-97 %] 94 %  Body mass index is 27 37 kg/m²  Input and Output Summary (last 24 hours): Intake/Output Summary (Last 24 hours) at 2019 1113  Last data filed at 2019 0835  Gross per 24 hour   Intake 600 ml   Output 1280 ml   Net -680 ml       Physical Exam:     Physical Exam   Constitutional: He has a sickly appearance  Cardiovascular: Regular rhythm, normal heart sounds and intact distal pulses  Exam reveals no gallop and no friction rub  No murmur heard  Pulmonary/Chest: No stridor  No respiratory distress  He has no wheezes  He has no rales  Abdominal: Soft  He exhibits no distension  There is no tenderness  There is no guarding  Genitourinary:   Genitourinary Comments: Ellis catheter in place   Musculoskeletal: He exhibits edema  He exhibits no deformity  Neurological: He is alert  Oriented x1   Skin: Skin is warm             Additional Data:     Labs:    Results from last 7 days   Lab Units 19  0649   WBC Thousand/uL 3 38*   HEMOGLOBIN g/dL 10 2*   HEMATOCRIT % 31 3*   PLATELETS Thousands/uL 83*   NEUTROS PCT % 51   LYMPHS PCT % 26   MONOS PCT % 20*   EOS PCT % 1     Results from last 7 days   Lab Units 19  0649  19  1825  19  1255   POTASSIUM mmol/L 3 4*   < >  --    < > 4 7   CHLORIDE mmol/L 105   < >  --    < > 99*   CO2 mmol/L 23   < >  --    < > 23   CO2, I-STAT mmol/L  --   --  17*  --   --    BUN mg/dL 25   < >  --    < > 23   CREATININE mg/dL 1 15   < >  --    < > 1 95*   CALCIUM mg/dL 8 3   < >  --    < > 9 3   ALK PHOS U/L  --   --   --   --  86   ALT U/L  --   --   --   --  <6*   AST U/L  --   --   --   --  19   GLUCOSE, ISTAT mg/dl --   --  148*  --   --     < > = values in this interval not displayed  Results from last 7 days   Lab Units 02/17/19  1552   INR  1 10       * I Have Reviewed All Lab Data Listed Above  * Additional Pertinent Lab Tests Reviewed: All Labs Within Last 24 Hours Reviewed    Imaging:    Imaging Reports Reviewed Today Include:   Imaging Personally Reviewed by Myself Includes:      Recent Cultures (last 7 days):     Results from last 7 days   Lab Units 02/18/19  0744 02/17/19  1728 02/16/19  1415 02/16/19  1323 02/16/19  1320   BLOOD CULTURE  No Growth at 24 hrs    No Growth at 24 hrs   --  Enterococcus faecalis* Enterococcus faecalis*  --    GRAM STAIN RESULT   --   --  Gram positive cocci in pairs and chains* Gram positive cocci in pairs and chains*  --    URINE CULTURE   --  >100,000 cfu/ml Enterococcus faecalis*  --   --  60,000-69,000 cfu/ml Enterococcus faecalis*       Last 24 Hours Medication List:     Current Facility-Administered Medications:  acetaminophen 650 mg Rectal Q6H PRN Nafisa Manuel MD    ampicillin 2,000 mg Intravenous Kemar Riojas MD Last Rate: 2,000 mg (02/19/19 2232)   aspirin 81 mg Oral QPM Nafisa Manuel MD    atorvastatin 80 mg Oral QPM Nafisa Manuel MD    carbidopa-levodopa 1 tablet Oral HS Nic Juan MD    carbidopa-levodopa 1 tablet Oral TID Nic Juan MD    chlorhexidine 15 mL Swish & Spit Q12H [address] Nic Juan MD    vitamin B-12 100 mcg Oral Daily Nic Juan MD    heparin (porcine) 5,000 Units Subcutaneous Q8H [address] Nic Juan MD    insulin lispro 1-6 Units Subcutaneous Q6H [address] Nic Juan MD    multi-electrolyte 50 mL/hr Intravenous Continuous Nic Juan MD Last Rate: 50 mL/hr (02/19/19 2232)   ondansetron 4 mg Intravenous Q6H PRN Nic Juan MD    rOPINIRole 0 75 mg Oral TID Nic Juan MD         Today, Patient Was Seen By: Diane Ramos MD    ** Please Note: This note has been constructed using a voice recognition system   **

## 2019-02-20 NOTE — PLAN OF CARE
Problem: Prexisting or High Potential for Compromised Skin Integrity  Goal: Skin integrity is maintained or improved  Description  INTERVENTIONS:  - Identify patients at risk for skin breakdown  - Assess and monitor skin integrity  - Assess and monitor nutrition and hydration status  - Monitor labs (i e  albumin)  - Assess for incontinence   - Turn and reposition patient  - Assist with mobility/ambulation  - Relieve pressure over bony prominences  - Avoid friction and shearing  - Provide appropriate hygiene as needed including keeping skin clean and dry  - Evaluate need for skin moisturizer/barrier cream  - Collaborate with interdisciplinary team (i e  Nutrition, Rehabilitation, etc )   - Patient/family teaching  Outcome: Progressing     Problem: Potential for Falls  Goal: Patient will remain free of falls  Description  INTERVENTIONS:  - Assess patient frequently for physical needs  -  Identify cognitive and physical deficits and behaviors that affect risk of falls  -  [place] fall precautions as indicated by assessment   - Educate patient/family on patient safety including physical limitations  - Instruct patient to call for assistance with activity based on assessment  - Modify environment to reduce risk of injury  - Consider OT/PT consult to assist with strengthening/mobility  Outcome: Progressing     Problem: Nutrition/Hydration-ADULT  Goal: Nutrient/Hydration intake appropriate for improving, restoring or maintaining nutritional needs  Description  Monitor and assess patient's nutrition/hydration status for malnutrition (ex- brittle hair, bruises, dry skin, pale skin and conjunctiva, muscle wasting, smooth red tongue, and disorientation)  Collaborate with interdisciplinary team and initiate plan and interventions as ordered  Monitor patient's weight and dietary intake as ordered or per policy  Utilize nutrition screening tool and intervene per policy   Determine patient's food preferences and provide high-protein, high-caloric foods as appropriate       INTERVENTIONS:  - Monitor oral intake, urinary output, labs, and treatment plans  - Assess nutrition and hydration status and recommend course of action  - Evaluate amount of meals eaten  - Assist patient with eating if necessary   - Allow adequate time for meals  - Recommend/ encourage appropriate diets, oral nutritional supplements, and vitamin/mineral supplements  - Order, calculate, and assess calorie counts as needed  - Recommend, monitor, and adjust tube feedings based on assessed needs  - Assess need for intravenous fluids  - Provide low sodium nutrition/hydration education as appropriate  - Include patient/family/caregiver in decisions related to nutrition   Outcome: Progressing     Problem: DISCHARGE PLANNING - CARE MANAGEMENT  Goal: Discharge to post-acute care or home with appropriate resources  Description  INTERVENTIONS:  - Conduct assessment to determine patient/family and health care team treatment goals, and need for post-acute services based on payer coverage, community resources, and patient preferences, and barriers to discharge  - Address psychosocial, clinical, and financial barriers to discharge as identified in assessment in conjunction with the patient/family and health care team  - Arrange appropriate level of post-acute services according to patient?s   needs and preference and payer coverage in collaboration with the physician and health care team  - Communicate with and update the patient/family, physician, and health care team regarding progress on the discharge plan  - Arrange appropriate transportation to post-acute venues  Outcome: Progressing

## 2019-02-20 NOTE — SPEECH THERAPY NOTE
Speech/Language Pathology Progress Note    Patient Name: Wolfgang Paul  Today's Date: 2/20/2019      Subjective:  "Like the [place]" referring to his mouth/throat       Objective:  Pt seen for dysphagia and caregiver education sessions bedside (total 60 mins)  No family present  Pt alert & at 61* with head supported  Poor speech intelligibility noted (25% in connected speech)    Pt was seen bedside to assess readiness & tolerance for po medications & diet  He was assessed c 2 ozs puree, 3ozs of honey thick liquid and 3-4ozs of nectar thick liquid  Pt presented c MINIMAL spontaneous oral closure for 30% of tsps/sips (I facilitated same with support to jaw & lips)  He evidenced s/s significantly weak and prolonged transfer with significant oral residue c puree and honey thick liquid  Only mild to mod delayed transfer and min residue c nectar liquid but pt presented c cough x3 c nectar thick  I was able to provide crushed Sinemet & Requip  I spoke c RN re: hx of dysphagia (pt known to ELISABETH Quintana from previous hospitalizations), severity and characteristics of pt's dysphagia  I also spoke c pt's dtr-in-law who reported that MD spoke of short term NG tube placement (pt & dtr-in-law spoke on phone in my presence and are now stating desire for same)       Assessment:  Pt with h/o dysphagia who has now decompensated  For the past 4-5 days, he has not had his PD medications consistently and he has had MINIMAL po intake with SLP only 2* severity of dysphagia  Family (dtr-in-law) & pt are now discussing option of short term NG tube (they asked MD to call them & discuss same)      Plan/Recommendations:  Continue diagnostic tx as indicated

## 2019-02-20 NOTE — UTILIZATION REVIEW
Continued Stay Review    Date: 2/20  Vital Signs: /78 (BP Location: Left arm)   Pulse 66   Temp 98 42 °F (36 9 °C) (Axillary)   Resp 16   Ht 5' 11" (1 803 m)   Wt 89 kg (196 lb 3 4 oz)   SpO2 94%   BMI 27 37 kg/m²   Assessment/Plan: [de-identified] yo male admitted w/ sepsis sec to UTI cont on IV abx   Bacteremia - enterococcus per ID cont ampicillin for 2 more weeks , Vanco was DC 'd yest   Dysphagia cont NPO f/u speech recommendations   IR consult will be placed for PEG tube placement   AIDA following BMP  S/p nephrostomy by IR , holding IVF d/t swelling      Medications:   Scheduled Meds:   Current Facility-Administered Medications:  acetaminophen 650 mg Rectal Q6H PRN     ampicillin 2,000 mg Intravenous Q8H  Last Rate: 2,000 mg (02/19/19 2232)   aspirin 81 mg Oral QPM     atorvastatin 80 mg Oral QPM     carbidopa-levodopa 1 tablet Oral HS     carbidopa-levodopa 1 tablet Oral TID     chlorhexidine 15 mL Swish & Spit Q12H [address]     vitamin B-12 100 mcg Oral Daily     heparin (porcine) 5,000 Units Subcutaneous Q8H [address]     insulin lispro 1-6 Units Subcutaneous Q6H [address]     ondansetron 4 mg Intravenous Q6H PRN     rOPINIRole 0 75 mg Oral TID       Pertinent Labs/Diagnostic Results: K 3 4, wbc 3 38, H&H   10 2  31 3, plt  83  Echo 2/18  EF 35 %   Age/Sex: [de-identified] y o  male   Discharge Plan: TBD

## 2019-02-20 NOTE — PLAN OF CARE
Problem: DISCHARGE PLANNING - CARE MANAGEMENT  Goal: Discharge to post-acute care or home with appropriate resources  Description  INTERVENTIONS:  - Conduct assessment to determine patient/family and health care team treatment goals, and need for post-acute services based on payer coverage, community resources, and patient preferences, and barriers to discharge  - Address psychosocial, clinical, and financial barriers to discharge as identified in assessment in conjunction with the patient/family and health care team  - Arrange appropriate level of post-acute services according to patient?s   needs and preference and payer coverage in collaboration with the physician and health care team  - Communicate with and update the patient/family, physician, and health care team regarding progress on the discharge plan  - Arrange appropriate transportation to post-acute venues  2/20/2019 1114 by Merlin Glasser, RN  Outcome: Progressing  Note:   CM received a call back from [facility] on [address] and was informed that pt is on a county program-not a waiver program, so no additional hrs for assistance in the home can be offered  Family may call [place] [employer]  to apply for waiver assistance at [phone]

## 2019-02-20 NOTE — SOCIAL WORK
CM received a call back from [facility] on [address] and was informed that pt is on a county program-not a waiver program, so no additional hrs for assistance in the home can be offered  Family may call [place] [employer]  to apply for waiver assistance at [phone]

## 2019-02-21 LAB
GLUCOSE SERPL-MCNC: 142 MG/DL (ref 65–140)
GLUCOSE SERPL-MCNC: 143 MG/DL (ref 65–140)
GLUCOSE SERPL-MCNC: 152 MG/DL (ref 65–140)
GLUCOSE SERPL-MCNC: 171 MG/DL (ref 65–140)
GLUCOSE SERPL-MCNC: 180 MG/DL (ref 65–140)

## 2019-02-21 PROCEDURE — 82948 REAGENT STRIP/BLOOD GLUCOSE: CPT

## 2019-02-21 PROCEDURE — 99232 SBSQ HOSP IP/OBS MODERATE 35: CPT | Performed by: FAMILY MEDICINE

## 2019-02-21 PROCEDURE — 97530 THERAPEUTIC ACTIVITIES: CPT

## 2019-02-21 PROCEDURE — 99233 SBSQ HOSP IP/OBS HIGH 50: CPT | Performed by: INTERNAL MEDICINE

## 2019-02-21 PROCEDURE — 92526 ORAL FUNCTION THERAPY: CPT

## 2019-02-21 RX ADMIN — HEPARIN SODIUM 5000 UNITS: 5000 INJECTION, SOLUTION INTRAVENOUS; SUBCUTANEOUS at 21:38

## 2019-02-21 RX ADMIN — CARBIDOPA AND LEVODOPA 1 TABLET: 25; 100 TABLET ORAL at 10:04

## 2019-02-21 RX ADMIN — AMPICILLIN SODIUM 2000 MG: 2 INJECTION, POWDER, FOR SOLUTION INTRAMUSCULAR; INTRAVENOUS at 17:42

## 2019-02-21 RX ADMIN — ROPINIROLE 0.75 MG: 0.25 TABLET, FILM COATED ORAL at 21:37

## 2019-02-21 RX ADMIN — CARBIDOPA AND LEVODOPA 1 TABLET: 25; 100 TABLET ORAL at 17:40

## 2019-02-21 RX ADMIN — INSULIN LISPRO 1 UNITS: 100 INJECTION, SOLUTION INTRAVENOUS; SUBCUTANEOUS at 14:12

## 2019-02-21 RX ADMIN — Medication 2000 MG: at 10:04

## 2019-02-21 RX ADMIN — ROPINIROLE 0.75 MG: 0.25 TABLET, FILM COATED ORAL at 10:04

## 2019-02-21 RX ADMIN — HEPARIN SODIUM 5000 UNITS: 5000 INJECTION, SOLUTION INTRAVENOUS; SUBCUTANEOUS at 14:16

## 2019-02-21 RX ADMIN — AMPICILLIN SODIUM 2000 MG: 2 INJECTION, POWDER, FOR SOLUTION INTRAMUSCULAR; INTRAVENOUS at 22:59

## 2019-02-21 RX ADMIN — CHLORHEXIDINE GLUCONATE 0.12% ORAL RINSE 15 ML: 1.2 LIQUID ORAL at 21:26

## 2019-02-21 RX ADMIN — INSULIN LISPRO 1 UNITS: 100 INJECTION, SOLUTION INTRAVENOUS; SUBCUTANEOUS at 17:52

## 2019-02-21 RX ADMIN — Medication 2000 MG: at 00:30

## 2019-02-21 RX ADMIN — CARBIDOPA AND LEVODOPA 1 TABLET: 25; 100 TABLET ORAL at 21:37

## 2019-02-21 RX ADMIN — HEPARIN SODIUM 5000 UNITS: 5000 INJECTION, SOLUTION INTRAVENOUS; SUBCUTANEOUS at 05:17

## 2019-02-21 RX ADMIN — ROPINIROLE 0.75 MG: 0.25 TABLET, FILM COATED ORAL at 17:40

## 2019-02-21 NOTE — PLAN OF CARE
Problem: Prexisting or High Potential for Compromised Skin Integrity  Goal: Skin integrity is maintained or improved  Description  INTERVENTIONS:  - Identify patients at risk for skin breakdown  - Assess and monitor skin integrity  - Assess and monitor nutrition and hydration status  - Monitor labs (i e  albumin)  - Assess for incontinence   - Turn and reposition patient  - Assist with mobility/ambulation  - Relieve pressure over bony prominences  - Avoid friction and shearing  - Provide appropriate hygiene as needed including keeping skin clean and dry  - Evaluate need for skin moisturizer/barrier cream  - Collaborate with interdisciplinary team (i e  Nutrition, Rehabilitation, etc )   - Patient/family teaching  Outcome: Progressing     Problem: Potential for Falls  Goal: Patient will remain free of falls  Description  INTERVENTIONS:  - Assess patient frequently for physical needs  -  Identify cognitive and physical deficits and behaviors that affect risk of falls    -  [place] fall precautions as indicated by assessment   - Educate patient/family on patient safety including physical limitations  - Instruct patient to call for assistance with activity based on assessment  - Modify environment to reduce risk of injury  - Consider OT/PT consult to assist with strengthening/mobility  Outcome: Progressing

## 2019-02-21 NOTE — PROGRESS NOTES
[facility] Progress Note  Patient: Audrey Montana  [de-identified] y o  male   MRN: [id number]  PCP: Izaiah Combs MD  Unit/Bed#: -01 Encounter: [id number]  Date Of Visit: 02/21/19    Assessment:    Principal Problem:    Sepsis ([facility] 75 )  Active Problems:    Parkinson's disease with use of electrical brain stimulation ([place] [address] )    S/P deep brain stimulator placement    Weakness generalized    Type 2 diabetes mellitus ([place] [address] )    HTN (hypertension)    History of CVA (cerebrovascular accident)    Ambulatory dysfunction    Hydronephrosis with urinary obstruction due to renal calculus    Bacteremia      Plan:    · Sepsis:  Secondary to urinary tract infection   Improving   Continue current IV antibiotic for 9 more days    · Bacteremia:  Enterococcus   Per Infectious Disease recommendation continue ampicillin  For 2 more weeks  Vancomycin was D/C    · Dysphagia:  Continue NPO, follow-up speech recommendation  IR consult for PEG tube   · Acute kidney injury:  Improving/    Follow BMP  Status percutaneous nephrostomy by IR  I am going to hold IVF due swollen  · Diastolic dysfunction:  Appears to be euvolemic   Close monitoring  ·        VTE Pharmacologic Prophylaxis:   Pharmacologic: Heparin  Mechanical VTE Prophylaxis in Place: Yes    Patient Centered Rounds: I have performed bedside rounds with nursing staff today  Discussions with Specialists or Other Care Team Provider:     Education and Discussions with Family / Patient: Patient and family member    Time Spent for Care: 20 minutes  More than 50% of total time spent on counseling and coordination of care as described above      Current Length of Stay: 5 day(s)    Current Patient Status: Inpatient   Certification Statement: The patient will continue to require additional inpatient hospital stay due to Acute above condition on IV antibiotic    Discharge Plan / Estimated Discharge Date:  Depends on clinical course,     Code Status: Level 3 - DNAR and DNI      Subjective:   Patient reports he is doing better  At bedside evaluation patient didnt pass current diet order    Objective:     Vitals:   Temp (24hrs), Av 6 °F (37 °C), Min:98 6 °F (37 °C), Max:98 6 °F (37 °C)    Temp:  [98 6 °F (37 °C)] 98 6 °F (37 °C)  SpO2:  [94 %] 94 %  Body mass index is 27 4 kg/m²  Input and Output Summary (last 24 hours): Intake/Output Summary (Last 24 hours) at 2019 1306  Last data filed at 2019 0943  Gross per 24 hour   Intake 120 ml   Output 1050 ml   Net -930 ml       Physical Exam:     Physical Exam   Constitutional: He is oriented to person, place, and time  He has a sickly appearance  Chronically ill appearance   Cardiovascular: Regular rhythm, normal heart sounds and intact distal pulses  Exam reveals no gallop and no friction rub  No murmur heard  Pulmonary/Chest: No stridor  No respiratory distress  He has no wheezes  He has no rales  Abdominal: Soft  He exhibits no distension and no mass  There is no tenderness  There is no guarding  Genitourinary:   Genitourinary Comments: Ellis catheter in place   Musculoskeletal: He exhibits edema  He exhibits no deformity  Neurological: He is alert and oriented to person, place, and time  A cranial nerve deficit is present  Coordination abnormal    Oriented x1   Skin: Skin is warm  Psychiatric: He has a normal mood and affect              Additional Data:     Labs:    Results from last 7 days   Lab Units 19  0649   WBC Thousand/uL 3 38*   HEMOGLOBIN g/dL 10 2*   HEMATOCRIT % 31 3*   PLATELETS Thousands/uL 83*   NEUTROS PCT % 51   LYMPHS PCT % 26   MONOS PCT % 20*   EOS PCT % 1     Results from last 7 days   Lab Units 19  0649  19  1825  19  1255   POTASSIUM mmol/L 3 4*   < >  --    < > 4 7   CHLORIDE mmol/L 105   < >  --    < > 99*   CO2 mmol/L 23   < >  --    < > 23   CO2, I-STAT mmol/L  --   --  17*  --   --    BUN mg/dL 25   < >  --    < > 23   CREATININE mg/dL 1 15   < > --    < > 1 95*   CALCIUM mg/dL 8 3   < >  --    < > 9 3   ALK PHOS U/L  --   --   --   --  86   ALT U/L  --   --   --   --  <6*   AST U/L  --   --   --   --  19   GLUCOSE, ISTAT mg/dl  --   --  148*  --   --     < > = values in this interval not displayed  Results from last 7 days   Lab Units 02/17/19  1552   INR  1 10       * I Have Reviewed All Lab Data Listed Above  * Additional Pertinent Lab Tests Reviewed: All Labs Within Last 24 Hours Reviewed    Imaging:    Imaging Reports Reviewed Today Include  Imaging Personally Reviewed by Myself Includes:      Recent Cultures (last 7 days):     Results from last 7 days   Lab Units 02/18/19  0744 02/17/19  1728 02/16/19  1415 02/16/19  1323 02/16/19  1320   BLOOD CULTURE  No Growth at 72 hrs    No Growth at 72 hrs   --  Enterococcus faecalis* Enterococcus faecalis*  --    GRAM STAIN RESULT   --   --  Gram positive cocci in pairs and chains* Gram positive cocci in pairs and chains*  --    URINE CULTURE   --  >100,000 cfu/ml Enterococcus faecalis*  --   --  60,000-69,000 cfu/ml Enterococcus faecalis*       Last 24 Hours Medication List:     Current Facility-Administered Medications:  acetaminophen 650 mg Rectal Q6H PRN Nafisa Manuel MD   ampicillin 2,000 mg Intravenous Caty Molina MD   aspirin 81 mg Oral QPM Frieda Nuñez MD   atorvastatin 80 mg Oral QPM Nafisa Manuel MD   carbidopa-levodopa 1 tablet Oral HS Frieda Nuñez MD   carbidopa-levodopa 1 tablet Oral TID Frieda Nuñez MD   chlorhexidine 15 mL Swish & Spit Q12H [address] Frieda Nuñez MD   vitamin B-12 100 mcg Oral Daily Frieda Nuñez MD   heparin (porcine) 5,000 Units Subcutaneous Q8H [address] Frieda Nuñez MD   insulin lispro 1-6 Units Subcutaneous Q6H [address] Frieda Nuñez MD   ondansetron 4 mg Intravenous Q6H PRN Frieda Nuñez MD   rOPINIRole 0 75 mg Oral TID Frieda Nuñez MD        Today, Patient Was Seen By: Patience Cabello MD    ** Please Note: This note has been constructed using a voice recognition system   **

## 2019-02-21 NOTE — PLAN OF CARE
Problem: PHYSICAL THERAPY ADULT  Goal: Performs mobility at highest level of function for planned discharge setting  See evaluation for individualized goals  Description  Treatment/Interventions: Functional transfer training, LE strengthening/ROM, Therapeutic exercise, Endurance training, Patient/family training, Equipment eval/education, Bed mobility, Gait training, Spoke to nursing, Spoke to case management, Spoke to MD, OT  Equipment Recommended: (TBD)       See flowsheet documentation for full assessment, interventions and recommendations     Outcome: Not Progressing

## 2019-02-21 NOTE — PROGRESS NOTES
Progress Note - Infectious Disease   Giulia Solomon  [de-identified] y o  male MRN: [id number]  Unit/Bed#: -01 Encounter: [id number]      Impression/Recommendations:  1   Septic shock, POA   Fever, leukocytosis, refractory hypotension   Due to # 2/3   Clinically improved, now off pressors  Rec:  ? Continue antibiotics as below  ? Follow temperatures closely  ? Supportive care as per the primary service     2   Enterococcus bacteremia  Darnell Challenge due to #3  [facility] blood cultures negative x48 hours   TTE technically difficult but negative for obvious vegetation  Rec:  ? Continue ampicillin for 2 weeks IV antibiotics through 3/2/19  ? Check weekly CBC with diff and creatinine while on IV antibiotics  ? OK from ID perspective for PICC line placement at any time     3   Obstructive Enterococcus pyelonephritis   Secondary to right ureteral stone   Status post Ellis, PCN   Clinically improving  Rec:  ? Continue antibiotics as above  ? Continue Ellis and PCN per Urology with close follow-up ongoing  ? Asked RN to clarify flush orders for Ellis with urology     4   AIDA  Darnell Challenge secondary to sepsis, obstruction   Slowly improving  Rec:  ? Follow creatinine closely and dose-adjust antibiotics as indicated  ? Increase ampicillin to Q6  ? Check BMP in AM     5   Status post ICD    His repeat blood cultures negative and has alternative source for enterococcal bacteremia, low suspicion for endocarditis or ICD infection  Rec:  ? Continue antibiotics as above  ? Follow up repeat blood cultures      6   Parkinson's with history of CVA      7   Leukopenia  Worsening  Consider residual effect of sepsis versus medications  No charmaine neutropenia  Rec:  ? Check CBC in a m      8  Disposition  Debilitated with swallowing difficulties and needs IV antibiotics  Rec:  · Favor STR at SNF at D/C    Discussed in detail with Dr Laura Sargent      Antibiotics:  Ampicillin #4  Antibiotics #5    Subjective:  Patient seen on AM rounds    Daughter at bedside and helps provide history  Denies fevers, chills, sweats, nausea, vomiting, or diarrhea  Right leg hurts due to positioning  24 Hour Events:  No documented fevers, chills, sweats, nausea, vomiting, or diarrhea  Objective:  Vitals:  Temp:  [98 6 °F (37 °C)] 98 6 °F (37 °C)  SpO2:  [94 %] 94 %  Temp (24hrs), Av 6 °F (37 °C), Min:98 6 °F (37 °C), Max:98 6 °F (37 °C)  Current: Temperature: 98 6 °F (37 °C)    Physical Exam:   General:  No acute distress  Eyes:  Normal lids and conjunctivae  ENT:  Normal external ears and nose  Neck:  Neck symmetric with midline trachea  Pulmonary:  Normal respiratory effort without accessory muscle use  Cardiovascular:  Regular rate and rhythm; no peripheral edema  Gastrointestinal:  No tenderness or distention  Musculoskeletal:  No digital clubbing or cyanosis  Skin:  No visible rashes; No palpable nodules  Neurologic:  Sensation grossly intact to light touch  Psychiatric:  Sleeping but easily arousable, then awake  :  Ellis and PCN with yellow urine    Lab Results:  I have personally reviewed pertinent labs    Results from last 7 days   Lab Units 19  0649 19  0458 19  1825 19  0458 19  1255   POTASSIUM mmol/L 3 4* 4 4  --  4 1 4 7   CHLORIDE mmol/L 105 102  --  103 99*   CO2 mmol/L 23 22  --  24 23   CO2, I-STAT mmol/L  --   --  17*  --   --    BUN mg/dL 25 24  --  23 23   CREATININE mg/dL 1 15 1 96*  --  2 03* 1 95*   EGFR ml/min/1 73sq m 60 31  --  30 32   GLUCOSE, ISTAT mg/dl  --   --  148*  --   --    CALCIUM mg/dL 8 3 8 9  --  8 5 9 3   AST U/L  --   --   --   --  19   ALT U/L  --   --   --   --  <6*   ALK PHOS U/L  --   --   --   --  86     Results from last 7 days   Lab Units 19  0649 19  0431 19  0458   WBC Thousand/uL 3 38* 3 54* 8 31   HEMOGLOBIN g/dL 10 2* 8 7* 11 9*   PLATELETS Thousands/uL 83* 73* 111*     Results from last 7 days   Lab Units 19  1312 19  0744 19  1728 19  1415 02/16/19  1323 02/16/19  1320   BLOOD CULTURE   --  No Growth at 48 hrs  No Growth at 48 hrs   --  Enterococcus faecalis* Enterococcus faecalis*  --    GRAM STAIN RESULT   --   --   --  Gram positive cocci in pairs and chains* Gram positive cocci in pairs and chains*  --    URINE CULTURE   --   --  >100,000 cfu/ml Enterococcus faecalis*  --   --  60,000-69,000 cfu/ml Enterococcus faecalis*   MRSA CULTURE ONLY  No Methicillin Resistant Staphlyococcus aureus (MRSA) isolated  --   --   --   --   --        Imaging Studies:   I have personally reviewed pertinent imaging study reports and images in PACS  EKG, Pathology, and Other Studies:   I have personally reviewed pertinent reports

## 2019-02-21 NOTE — PLAN OF CARE
Problem: Prexisting or High Potential for Compromised Skin Integrity  Goal: Skin integrity is maintained or improved  Description  INTERVENTIONS:  - Identify patients at risk for skin breakdown  - Assess and monitor skin integrity  - Assess and monitor nutrition and hydration status  - Monitor labs (i e  albumin)  - Assess for incontinence   - Turn and reposition patient  - Assist with mobility/ambulation  - Relieve pressure over bony prominences  - Avoid friction and shearing  - Provide appropriate hygiene as needed including keeping skin clean and dry  - Evaluate need for skin moisturizer/barrier cream  - Collaborate with interdisciplinary team (i e  Nutrition, Rehabilitation, etc )   - Patient/family teaching  Outcome: Progressing     Problem: Potential for Falls  Goal: Patient will remain free of falls  Description  INTERVENTIONS:  - Assess patient frequently for physical needs  -  Identify cognitive and physical deficits and behaviors that affect risk of falls  -  [place] fall precautions as indicated by assessment   - Educate patient/family on patient safety including physical limitations  - Instruct patient to call for assistance with activity based on assessment  - Modify environment to reduce risk of injury  - Consider OT/PT consult to assist with strengthening/mobility  Outcome: Progressing     Problem: Nutrition/Hydration-ADULT  Goal: Nutrient/Hydration intake appropriate for improving, restoring or maintaining nutritional needs  Description  Monitor and assess patient's nutrition/hydration status for malnutrition (ex- brittle hair, bruises, dry skin, pale skin and conjunctiva, muscle wasting, smooth red tongue, and disorientation)  Collaborate with interdisciplinary team and initiate plan and interventions as ordered  Monitor patient's weight and dietary intake as ordered or per policy  Utilize nutrition screening tool and intervene per policy   Determine patient's food preferences and provide high-protein, high-caloric foods as appropriate       INTERVENTIONS:  - Monitor oral intake, urinary output, labs, and treatment plans  - Assess nutrition and hydration status and recommend course of action  - Evaluate amount of meals eaten  - Assist patient with eating if necessary   - Allow adequate time for meals  - Recommend/ encourage appropriate diets, oral nutritional supplements, and vitamin/mineral supplements  - Order, calculate, and assess calorie counts as needed  - Recommend, monitor, and adjust tube feedings based on assessed needs  - Assess need for intravenous fluids  - Provide low sodium nutrition/hydration education as appropriate  - Include patient/family/caregiver in decisions related to nutrition   Outcome: Progressing     Problem: DISCHARGE PLANNING - CARE MANAGEMENT  Goal: Discharge to post-acute care or home with appropriate resources  Description  INTERVENTIONS:  - Conduct assessment to determine patient/family and health care team treatment goals, and need for post-acute services based on payer coverage, community resources, and patient preferences, and barriers to discharge  - Address psychosocial, clinical, and financial barriers to discharge as identified in assessment in conjunction with the patient/family and health care team  - Arrange appropriate level of post-acute services according to patient?s   needs and preference and payer coverage in collaboration with the physician and health care team  - Communicate with and update the patient/family, physician, and health care team regarding progress on the discharge plan  - Arrange appropriate transportation to post-acute venues  Outcome: Progressing

## 2019-02-21 NOTE — PHYSICAL THERAPY NOTE
Physical Therapy Treatment Note       02/21/19 1250   Pain Assessment   Pain Assessment No/denies pain   Pain Score No Pain   Restrictions/Precautions   Weight Bearing Precautions Per Order No   Braces or Orthoses   (none per chart review)   Other Precautions Chair Alarm;Multiple lines;Telemetry;Limb alert   General   Chart Reviewed Yes   Response to Previous Treatment Patient unable to report, no changes reported from family or staff   Family/Caregiver Present Yes   Cognition   Arousal/Participation Persistent stimuli required  (lethargic at times- RN aware)   Attention Difficulty attending to directions   Orientation Level Oriented to person;Oriented to place;Oriented to time   Following Commands Follows one step commands with increased time or repetition   Comments pt agreeable to PT session   Subjective   Subjective "I want to sit up, my ass hurts"   Bed Mobility   Rolling R 2  Maximal assistance   Additional items Assist x 2;HOB elevated; Increased time required;Verbal cues;LE management   Rolling L 2  Maximal assistance   Additional items Assist x 2;HOB elevated; Increased time required;Verbal cues;LE management   Supine to Sit 2  Maximal assistance   Additional items Assist x 2;HOB elevated; Increased time required;Verbal cues;LE management  (75% of trial)   Sit to Supine 2  Maximal assistance   Additional items Assist x 2;HOB elevated; Increased time required;Verbal cues;LE management   Additional Comments vitals returned when returned BTB: 111/70mmHg, 94% SPO2 on RA, 82bpm   Transfers   Sit to Stand Unable to assess  (2/2 poor sitting tolerance/balance)   Ambulation/Elevation   Gait pattern Not tested; Not appropriate   Balance   Static Sitting Poor -   Dynamic Sitting   (DNT)   Endurance Deficit   Endurance Deficit Yes   Activity Tolerance   Activity Tolerance Patient limited by fatigue   Medical Staff Made Aware CHANTEL Parada   Nurse Made Aware ELISABETH Tucker verbalized pt appropriate to see, made aware of session outcome/recs  Present to provide assistance throughout session   Assessment   Prognosis Guarded   Problem List Decreased strength;Decreased endurance; Impaired balance;Decreased mobility; Decreased safety awareness   Assessment Pt seen for PT treatment session this date with interventions consisting of therapeutic activity consisting of training: bed mobility, supine<>sit transfers, static sitting tolerance at EOB 75% of trial for 1 minute w/ B UE support and vc and tactile cues for static sitting posture facilitation requiring max Ax2 for sitting symmetry and posture via tactile cues  Pt agreeable to PT treatment session upon arrival, pt found supine in bed w/ HOB elevated, in no apparent distress and requiring persistent stimuli 2/2 lethargy at times  In comparison to previous session, pt with no improvements as evidenced by increased A for bedlevel mobility, inability to initiate unsupported sitting towards EOB, pt requiring max A x2 for sitting at EOB 75% of trial x1 min duration prior to returning BTB, vitals remained stable, pt in NAD  Post session: pt returned BTB, bed alarm engaged, all needs in reach and RN notified of session findings/recommendations  Education to pt's family regarding d/c rec- in agreement for d/c to STR  Continue to recommend STR at time of d/c in order to maximize pt's functional independence and safety w/ mobility  Pt continues to be functioning below baseline level, and remains limited 2* factors listed above  PT will continue to see pt while here in order to address the deficits listed above and provide interventions consistent w/ POC in effort to achieve STGs  Barriers to Discharge Inaccessible home environment;Decreased caregiver support   Goals   Patient Goals to sit up   STG Expiration Date 03/01/19   Treatment Day 1   Plan   Treatment/Interventions Functional transfer training;LE strengthening/ROM; Therapeutic exercise; Endurance training;Patient/family training;Equipment eval/education; Bed mobility;Gait training;Spoke to nursing;Spoke to case management;Spoke to MD;Family   Progress No functional improvements   PT Frequency   (3-5x/wk)   Recommendation   Recommendation Short-term skilled PT   Equipment Recommended   (TBD at IP rehab)   PT - OK to Discharge Yes  (when medically cleared if to STR)       Mallorie Ellis, PT, DPT    Time of PT treatment session: [id number]

## 2019-02-21 NOTE — SPEECH THERAPY NOTE
Speech/Language Pathology Progress Note    Patient Name: Renzo Davila  Today's Date: 2/21/2019      Subjective:  "He does sleep some during the day, but he I sleepier today than normal "      Objective:  Pt seen for dysphagia and caregiver education sessions bedside (education completed c wife & dtr-in-law)   Pt positioned upright at 70* with head supported  Poor speech intelligibility noted (25% in connected speech)     Pt was fed slowly by dtr-in-law (2ozs yogurt, 1 of nectar thick liquid and tsp Magic Cup (fortified ice cream)  Pt presented c spontaneous oral closure c initial tsps then required maximum amt of  time or physical assist for jaw/labial closure (demonstrated same & dtr-in-law then facilitated closure  He evidenced s/s weak and prolonged transfer with increasing oral residue with yogurt as feedings progressed  We discussed attempting to provide a very cold bolus (eg magic cup ice cream) to promote oral transfer time  With 1st tsps of ice cream, good labial closure and prompt manipulate and transfer noed c clearance of yogurt residue  However, on final tsps of ice cream, yogurt and nectar thick liquid, pt presented c increased cough  Given time and encouragement to cough, cough strength increased and "wet" vocal quality cleared  I educated family in importance of allowing pt sufficient time to cough and clear airway (and explained rationale for same)      Assessment:  Pt presented with reduced sustained alertness and s/s significant oral dysphagia to begin, then progressed to s/s pharyngeal dysphagia  Family education begun       Plan/Recommendations:  Continue as indicated (dtr-in-law Samuel De La Fuente reported that she wanted to speak c MD re: plan of care & nutritional plan)

## 2019-02-22 ENCOUNTER — APPOINTMENT (INPATIENT)
Dept: INTERVENTIONAL RADIOLOGY/VASCULAR | Facility: HOSPITAL | Age: 79
DRG: 871 | End: 2019-02-22
Attending: FAMILY MEDICINE
Payer: COMMERCIAL

## 2019-02-22 ENCOUNTER — ANESTHESIA (INPATIENT)
Dept: INTERVENTIONAL RADIOLOGY/VASCULAR | Facility: HOSPITAL | Age: 79
DRG: 871 | End: 2019-02-22
Payer: COMMERCIAL

## 2019-02-22 LAB
ANION GAP SERPL CALCULATED.3IONS-SCNC: 8 MMOL/L (ref 4–13)
BUN SERPL-MCNC: 21 MG/DL (ref 5–25)
CALCIUM SERPL-MCNC: 8.6 MG/DL (ref 8.3–10.1)
CHLORIDE SERPL-SCNC: 108 MMOL/L (ref 100–108)
CO2 SERPL-SCNC: 29 MMOL/L (ref 21–32)
CREAT SERPL-MCNC: 1.03 MG/DL (ref 0.6–1.3)
ERYTHROCYTE [DISTWIDTH] IN BLOOD BY AUTOMATED COUNT: 13.2 % (ref 11.6–15.1)
GFR SERPL CREATININE-BSD FRML MDRD: 68 ML/MIN/1.73SQ M
GLUCOSE SERPL-MCNC: 122 MG/DL (ref 65–140)
GLUCOSE SERPL-MCNC: 141 MG/DL (ref 65–140)
GLUCOSE SERPL-MCNC: 152 MG/DL (ref 65–140)
GLUCOSE SERPL-MCNC: 156 MG/DL (ref 65–140)
GLUCOSE SERPL-MCNC: 157 MG/DL (ref 65–140)
GLUCOSE SERPL-MCNC: 158 MG/DL (ref 65–140)
HCT VFR BLD AUTO: 33.4 % (ref 36.5–49.3)
HGB BLD-MCNC: 10.5 G/DL (ref 12–17)
MCH RBC QN AUTO: 30.5 PG (ref 26.8–34.3)
MCHC RBC AUTO-ENTMCNC: 31.4 G/DL (ref 31.4–37.4)
MCV RBC AUTO: 97 FL (ref 82–98)
PLATELET # BLD AUTO: 106 THOUSANDS/UL (ref 149–390)
PMV BLD AUTO: 10.8 FL (ref 8.9–12.7)
POTASSIUM SERPL-SCNC: 3.1 MMOL/L (ref 3.5–5.3)
RBC # BLD AUTO: 3.44 MILLION/UL (ref 3.88–5.62)
SODIUM SERPL-SCNC: 145 MMOL/L (ref 136–145)
WBC # BLD AUTO: 4.64 THOUSAND/UL (ref 4.31–10.16)

## 2019-02-22 PROCEDURE — 0DH63UZ INSERTION OF FEEDING DEVICE INTO STOMACH, PERCUTANEOUS APPROACH: ICD-10-PCS | Performed by: RADIOLOGY

## 2019-02-22 PROCEDURE — 97530 THERAPEUTIC ACTIVITIES: CPT

## 2019-02-22 PROCEDURE — 85027 COMPLETE CBC AUTOMATED: CPT | Performed by: INTERNAL MEDICINE

## 2019-02-22 PROCEDURE — 49440 PLACE GASTROSTOMY TUBE PERC: CPT | Performed by: RADIOLOGY

## 2019-02-22 PROCEDURE — 82948 REAGENT STRIP/BLOOD GLUCOSE: CPT

## 2019-02-22 PROCEDURE — 99232 SBSQ HOSP IP/OBS MODERATE 35: CPT | Performed by: INTERNAL MEDICINE

## 2019-02-22 PROCEDURE — 99232 SBSQ HOSP IP/OBS MODERATE 35: CPT | Performed by: FAMILY MEDICINE

## 2019-02-22 PROCEDURE — 49440 PLACE GASTROSTOMY TUBE PERC: CPT

## 2019-02-22 PROCEDURE — 80048 BASIC METABOLIC PNL TOTAL CA: CPT | Performed by: INTERNAL MEDICINE

## 2019-02-22 RX ORDER — LIDOCAINE HYDROCHLORIDE 10 MG/ML
INJECTION, SOLUTION INFILTRATION; PERINEURAL AS NEEDED
Status: DISCONTINUED | OUTPATIENT
Start: 2019-02-22 | End: 2019-02-22 | Stop reason: SURG

## 2019-02-22 RX ORDER — POTASSIUM CHLORIDE 14.9 MG/ML
20 INJECTION INTRAVENOUS
Status: DISCONTINUED | OUTPATIENT
Start: 2019-02-22 | End: 2019-02-22

## 2019-02-22 RX ORDER — ROPINIROLE 0.25 MG/1
0.75 TABLET, FILM COATED ORAL 3 TIMES DAILY
Status: DISCONTINUED | OUTPATIENT
Start: 2019-02-23 | End: 2019-02-22

## 2019-02-22 RX ORDER — PROPOFOL 10 MG/ML
INJECTION, EMULSION INTRAVENOUS AS NEEDED
Status: DISCONTINUED | OUTPATIENT
Start: 2019-02-22 | End: 2019-02-22 | Stop reason: SURG

## 2019-02-22 RX ORDER — ROPINIROLE 0.25 MG/1
0.75 TABLET, FILM COATED ORAL 3 TIMES DAILY
Status: DISCONTINUED | OUTPATIENT
Start: 2019-02-22 | End: 2019-02-26 | Stop reason: HOSPADM

## 2019-02-22 RX ORDER — KETAMINE HYDROCHLORIDE 50 MG/ML
INJECTION, SOLUTION, CONCENTRATE INTRAMUSCULAR; INTRAVENOUS AS NEEDED
Status: DISCONTINUED | OUTPATIENT
Start: 2019-02-22 | End: 2019-02-22 | Stop reason: SURG

## 2019-02-22 RX ORDER — POTASSIUM CHLORIDE 29.8 MG/ML
40 INJECTION INTRAVENOUS ONCE
Status: DISCONTINUED | OUTPATIENT
Start: 2019-02-22 | End: 2019-02-22

## 2019-02-22 RX ORDER — SODIUM CHLORIDE, SODIUM LACTATE, POTASSIUM CHLORIDE, CALCIUM CHLORIDE 600; 310; 30; 20 MG/100ML; MG/100ML; MG/100ML; MG/100ML
INJECTION, SOLUTION INTRAVENOUS CONTINUOUS PRN
Status: DISCONTINUED | OUTPATIENT
Start: 2019-02-22 | End: 2019-02-22 | Stop reason: SURG

## 2019-02-22 RX ADMIN — HEPARIN SODIUM 5000 UNITS: 5000 INJECTION, SOLUTION INTRAVENOUS; SUBCUTANEOUS at 06:21

## 2019-02-22 RX ADMIN — AMPICILLIN SODIUM 2000 MG: 2 INJECTION, POWDER, FOR SOLUTION INTRAMUSCULAR; INTRAVENOUS at 05:07

## 2019-02-22 RX ADMIN — PROPOFOL 20 MG: 10 INJECTION, EMULSION INTRAVENOUS at 15:47

## 2019-02-22 RX ADMIN — CARBIDOPA AND LEVODOPA 1 TABLET: 25; 100 TABLET ORAL at 23:26

## 2019-02-22 RX ADMIN — PROPOFOL 20 MG: 10 INJECTION, EMULSION INTRAVENOUS at 16:04

## 2019-02-22 RX ADMIN — CHLORHEXIDINE GLUCONATE 0.12% ORAL RINSE 15 ML: 1.2 LIQUID ORAL at 23:19

## 2019-02-22 RX ADMIN — IOHEXOL 15 ML: 300 INJECTION, SOLUTION INTRAVENOUS at 16:40

## 2019-02-22 RX ADMIN — PROPOFOL 20 MG: 10 INJECTION, EMULSION INTRAVENOUS at 15:49

## 2019-02-22 RX ADMIN — KETAMINE HYDROCHLORIDE 20 MG: 50 INJECTION, SOLUTION INTRAMUSCULAR; INTRAVENOUS at 15:45

## 2019-02-22 RX ADMIN — INSULIN LISPRO 1 UNITS: 100 INJECTION, SOLUTION INTRAVENOUS; SUBCUTANEOUS at 06:21

## 2019-02-22 RX ADMIN — POTASSIUM CHLORIDE 20 MEQ: 200 INJECTION, SOLUTION INTRAVENOUS at 11:55

## 2019-02-22 RX ADMIN — HEPARIN SODIUM 5000 UNITS: 5000 INJECTION, SOLUTION INTRAVENOUS; SUBCUTANEOUS at 23:19

## 2019-02-22 RX ADMIN — PROPOFOL 20 MG: 10 INJECTION, EMULSION INTRAVENOUS at 15:58

## 2019-02-22 RX ADMIN — ROPINIROLE 0.75 MG: 0.25 TABLET, FILM COATED ORAL at 23:26

## 2019-02-22 RX ADMIN — INSULIN LISPRO 1 UNITS: 100 INJECTION, SOLUTION INTRAVENOUS; SUBCUTANEOUS at 01:05

## 2019-02-22 RX ADMIN — PROPOFOL 40 MG: 10 INJECTION, EMULSION INTRAVENOUS at 15:45

## 2019-02-22 RX ADMIN — GLUCAGON HYDROCHLORIDE 1 MG: KIT at 15:52

## 2019-02-22 RX ADMIN — AMPICILLIN SODIUM 2000 MG: 2 INJECTION, POWDER, FOR SOLUTION INTRAMUSCULAR; INTRAVENOUS at 11:08

## 2019-02-22 RX ADMIN — SODIUM CHLORIDE, SODIUM LACTATE, POTASSIUM CHLORIDE, AND CALCIUM CHLORIDE: .6; .31; .03; .02 INJECTION, SOLUTION INTRAVENOUS at 15:16

## 2019-02-22 RX ADMIN — LIDOCAINE HYDROCHLORIDE 50 MG: 10 INJECTION, SOLUTION INFILTRATION; PERINEURAL at 15:45

## 2019-02-22 RX ADMIN — AMPICILLIN SODIUM 2000 MG: 2 INJECTION, POWDER, FOR SOLUTION INTRAMUSCULAR; INTRAVENOUS at 23:15

## 2019-02-22 NOTE — PLAN OF CARE
Problem: Nutrition/Hydration-ADULT  Goal: Nutrient/Hydration intake appropriate for improving, restoring or maintaining nutritional needs  Description  Monitor and assess patient's nutrition/hydration status for malnutrition (ex- brittle hair, bruises, dry skin, pale skin and conjunctiva, muscle wasting, smooth red tongue, and disorientation)  Collaborate with interdisciplinary team and initiate plan and interventions as ordered  Monitor patient's weight and dietary intake as ordered or per policy  Utilize nutrition screening tool and intervene per policy  Determine patient's food preferences and provide high-protein, high-caloric foods as appropriate  INTERVENTIONS:  - Monitor oral intake, urinary output, labs, and treatment plans  - Assess nutrition and hydration status and recommend course of action  - Evaluate amount of meals eaten  - Assist patient with eating if necessary   - Allow adequate time for meals  - Recommend/ encourage appropriate diets, oral nutritional supplements, and vitamin/mineral supplements  - Order, calculate, and assess calorie counts as needed  - Recommend, monitor, and adjust tube feedings based on assessed needs  - Assess need for intravenous fluids  - Provide low sodium nutrition/hydration education as appropriate  - Include patient/family/caregiver in decisions related to nutrition   Outcome: Not Progressing  Note:   Pt NPO for potential PEG placement today  Recommend Jevity 1 2 @ 10ml/h, increasing as tolerated to goal of 75ml/h with 150ml H20 flush q 6h to provide 2160kcal, 100g protein, 2058ml total fluid   Will continue to monitor plan of care and any further diet education needs

## 2019-02-22 NOTE — UTILIZATION REVIEW
Continued Stay Review    Date: 2/22  Vital Signs: /75   Pulse 82   Temp (!) 97 34 °F (36 3 °C)   Resp 18   Ht 5' 11" (1 803 m)   Wt 88 5 kg (195 lb 1 7 oz)   SpO2 97%   BMI 27 21 kg/m²   Assessment/Plan: [de-identified] yo male admitted w/ sepsis sec to UTI , cont IV abx for an additional 8 days   Bacteremia enterococcus per ID cont  ampicillin x2 weeks, vanco DC 'd / Dysphagia cont NPO , pt to IR today for Peg placement   AIDA- s/p nephrostomy by IR , holding IVF bc of swelling   Hypokalemia- replace K   parkinson's cont home meds  Medications:   Scheduled Meds:   Current Facility-Administered Medications:  acetaminophen 650 mg Rectal Q6H PRN     ampicillin 2,000 mg Intravenous Q6H  Last Rate: 2,000 mg (02/22/19 1108)   aspirin 81 mg Oral QPM     atorvastatin 80 mg Oral QPM     carbidopa-levodopa 1 tablet Oral HS     carbidopa-levodopa 1 tablet Oral TID     chlorhexidine 15 mL Swish & Spit Q12H [facility]     vitamin B-12 100 mcg Oral Daily     heparin (porcine) 5,000 Units Subcutaneous Q8H [facility]     insulin lispro 1-6 Units Subcutaneous Q6H [facility]     ondansetron 4 mg Intravenous Q6H PRN     potassium chloride 20 mEq Intravenous Q2H  Last Rate: 20 mEq (02/22/19 1155)   rOPINIRole 0 75 mg Oral TID       Pertinent Labs/Diagnostic Results: K  3 1, gluc 156, H&H   10 5  33 4, plt  106  Age/Sex: [de-identified] y o  male   Discharge Plan: TBD       ID note  2/22  1   Septic shock, POA   Fever, leukocytosis, refractory hypotension   Due to # 2/3   Clinically improved, now off pressors  Rec:  ? Continue antibiotics as below  ? Follow temperatures closely  ? Supportive care as per the primary service     2   Enterococcus bacteremia  Carley Gain due to #3   Repeat blood cultures negative x48 hours   TTE technically difficult but negative for obvious vegetation  Rec:  ? Continue ampicillin for 2 weeks IV antibiotics through 3/2/19  ? Check weekly CBC with diff and creatinine while on IV antibiotics  ?  OK from ID perspective for PICC line placement at any time     3   Obstructive Enterococcus pyelonephritis  Boogie Madhu to right ureteral stone   Status post Ellis, PCN   Clinically improving  Rec:  ? Continue antibiotics as above  ? Continue Ellis and PCN per Urology with close follow-up ongoing     4   AIDA   Likely secondary to sepsis, obstruction   Improved  Rec:  ? Follow creatinine closely and dose-adjust antibiotics as indicated     5   Status post ICD    His repeat blood cultures negative and has alternative source for enterococcal bacteremia, low suspicion for endocarditis or ICD infection  Rec:  ? Continue antibiotics as above     6   Parkinson's with history of CVA      7   Disposition   Debilitated with swallowing difficulties and needs IV antibiotics     Rec:  ?  Favor STR at SNF at D/C

## 2019-02-22 NOTE — SPEECH THERAPY NOTE
Speech-Language Pathology Attempt Note    Pt is NPO for potential PEG placement today  Will continue to follow

## 2019-02-22 NOTE — ANESTHESIA PREPROCEDURE EVALUATION
Review of Systems/Medical History  Patient summary reviewed  Chart reviewed  No history of anesthetic complications     Cardiovascular  EKG reviewed, Exercise tolerance (METS): <4,  Pacemaker/AICD, Hypertension , Past MI , CAD , Cardiac stents  CHF ,   Comment: Bacteremic and septic  Enterococcus due to urologic source,  Pulmonary  Oxygen dependent nasal cannula,        GI/Hepatic  Dysphagia,     Comment: Aspiration      Prostatic disorder, benign prostatic hyperplasia       Endo/Other  Diabetes well controlled type 2 Insulin,   Obesity    GYN  Negative gynecology ROS          Hematology  Negative hematology ROS Anemia ,  Thrombocytopenia,    Musculoskeletal    Arthritis     Neurology    CVA , residual symptoms, Aphasia/Dysphagia,   Comment: parkinson's with DBS Psychology   Negative psychology ROS              Physical Exam    Airway    Mallampati score: II  TM Distance: >3 FB  Neck ROM: limited     Dental   upper dentures and lower dentures,     Cardiovascular  Rhythm: regular, Rate: normal,     Pulmonary  Rhonchi, Decreased breath sounds,     Other Findings    Study date:  2019     Patient: Guerita Webster  MR number: [id number]  Account number: [de-identified]  : 1940  Age: [de-identified] years  Gender: Male  Status: Inpatient  Location: Bedside  Height: 71 in  Weight: 187 lb  BP: 97/ 55 mmHg     Indications: Gram positive cocci, Evaluate for suspected valvular vegetation      Diagnoses: R78 81 - Bacteremia     Sonographer:  Mary Barraza RDCS  Referring Physician:  Sami Luis PA-C  Group:  [facility]  Interpreting Physician:  Kleber Montoya MD     SUMMARY     PROCEDURE INFORMATION:  This was a technically difficult study  Echocardiographic views were limited due to poor acoustic window availability, decreased penetration, and lung interference    Intravenous contrast (  8mL of Definity in NSS) was administered to opacify the left ventricle      LEFT VENTRICLE:  Systolic function was moderately to markedly reduced  Ejection fraction was estimated to be 35 %  This study was inadequate for the evaluation of regional wall motion  Doppler parameters were consistent with abnormal left ventricular relaxation (grade 1 diastolic dysfunction)      RIGHT VENTRICLE:  The size was normal   Systolic function was normal   A pacing wire was present      RIGHT ATRIUM:  A pacing wire was present      MITRAL VALVE:  There was mild annular calcification  There was trace regurgitation      TRICUSPID VALVE:  There was trace regurgitation  Pulmonary artery systolic pressure was within the normal range      HISTORY: Consistent with transient ischemic attack or stroke  PRIOR HISTORY: Sepsis, Bacteremia, Risk factors: hypertension and diabetes  Remote stroke  PRIOR PROCEDURES: Stent  ICD implantation  Anesthesia Plan  ASA Score- 4     Anesthesia Type- IV sedation with anesthesia with ASA Monitors  Additional Monitors:   Airway Plan:     Comment: PATIENT APPEARS DECONDITIONED WITH POOR RESPIRATORY EFFORT AND COUGH; IS AT INCREASED RISK FOR PERIOPERATIVE PULMONARY COMPLICATIONS INCLUDING REQUIREMENT FOR POST-OPERATIVE MV  DISCUSSED WITH SON, PLAN FOR MAC WITH PROCEDURAL REVOCATION OF DNR/DNI  PER DIANE, "KEEP HIM ALIVE AT ALL COSTS TO GET HIM MORE NUTRITION  "  Plan Factors-    Induction- intravenous  Postoperative Plan- Plan for postoperative opioid use  Planned trial extubation    Informed Consent- Anesthetic plan and risks discussed with patient  I personally reviewed this patient with the CRNA  Discussed and agreed on the Anesthesia Plan with the CRNA         Lab Results   Component Value Date    WBC 4 64 02/22/2019    HGB 10 5 (L) 02/22/2019    HCT 33 4 (L) 02/22/2019    MCV 97 02/22/2019     (L) 02/22/2019     Lab Results   Component Value Date    GLUCOSE 148 (H) 02/17/2019    CALCIUM 8 6 02/22/2019     10/01/2015    K 3 1 (L) 02/22/2019    CO2 29 02/22/2019     02/22/2019    BUN 21 02/22/2019    CREATININE 1 03 02/22/2019     Type and Screen:  O  No results found for this or any previous visit

## 2019-02-22 NOTE — PROGRESS NOTES
[facility] Progress Note  Patient: Gume Contreras  [de-identified] y o  male   MRN: [id number]  PCP: Gurdeep Gayle MD  Unit/Bed#: -01 Encounter: [id number]  Date Of Visit: 02/22/19    Assessment:    Principal Problem:    Sepsis ([place] [address] )  Active Problems:    Parkinson's disease with use of electrical brain stimulation ([place] [address] )    S/P deep brain stimulator placement    Weakness generalized    Type 2 diabetes mellitus ([place] [address] )    HTN (hypertension)    History of CVA (cerebrovascular accident)    Ambulatory dysfunction    Hydronephrosis with urinary obstruction due to renal calculus    Bacteremia      Plan:    · Sepsis:  Improved  Secondary to urinary tract infection   Improving   Continue current IV antibiotic for 8 more days    · Bacteremia:  Enterococcus   Per Infectious Disease recommendation continue ampicillin   For 2 more weeks  Vancomycin was D/C    · Dysphagia:  Continue NPO, follow-up speech recommendation   IR consult for PEG tube, patient is on call to IR today   · Acute kidney injury:  Improving  Status percutaneous nephrostomy by IR  I am going to hold IVF due swollen    · Diastolic dysfunction:  Appears to be euvolemic   Close monitoring  · Hypokalemia:  Potassium will be replaced  · Parkinson disease:  Continue home medication           VTE Pharmacologic Prophylaxis:   Pharmacologic: Heparin  Mechanical VTE Prophylaxis in Place: Yes    Patient Centered Rounds: I have performed bedside rounds with nursing staff today  Discussions with Specialists or Other Care Team Provider:    Education and Discussions with Family / Patient:  Patient    Time Spent for Care: 20 minutes  More than 50% of total time spent on counseling and coordination of care as described above      Current Length of Stay: 6 day(s)    Current Patient Status: Inpatient   Certification Statement: The patient will continue to require additional inpatient hospital stay due to Acute above condition on IV antibiotic    Discharge Plan / Estimated Discharge Date:  Depends on clinical course  Code Status: Level 3 - DNAR and DNI      Subjective:   Despite current situation of being NPO patient reported that he is good  Denies any complain  Objective:     Vitals:   Temp (24hrs), Av 4 °F (36 3 °C), Min:97 34 °F (36 3 °C), Max:97 5 °F (36 4 °C)    Temp:  [97 34 °F (36 3 °C)-97 5 °F (36 4 °C)] 97 34 °F (36 3 °C)  HR:  [48-82] 82  Resp:  [17-18] 18  BP: (125-126)/(72-75) 125/75  SpO2:  [89 %-97 %] 97 %  Body mass index is 27 21 kg/m²  Input and Output Summary (last 24 hours): Intake/Output Summary (Last 24 hours) at 2019 1126  Last data filed at 2019 1754  Gross per 24 hour   Intake    Output 450 ml   Net -450 ml       Physical Exam:     Physical Exam   Constitutional: He is oriented to person, place, and time  He has a sickly appearance  No distress  Chronically ill appearance   Cardiovascular: Regular rhythm, normal heart sounds and intact distal pulses  Exam reveals no gallop and no friction rub  No murmur heard  Pulmonary/Chest: No stridor  No respiratory distress  He has no wheezes  He has no rales  Abdominal: Soft  He exhibits no distension and no mass  There is no tenderness  There is no guarding  Genitourinary:   Genitourinary Comments: Ellis catheter in place   Musculoskeletal: He exhibits no edema or deformity  Decreased range of motion   Neurological: He is alert and oriented to person, place, and time  A cranial nerve deficit is present  Coordination abnormal    Oriented x1   Skin: Skin is warm  Capillary refill takes 2 to 3 seconds  He is not diaphoretic  There is pallor  Psychiatric: He has a normal mood and affect             Additional Data:     Labs:    Results from last 7 days   Lab Units 19  0505 19  0649   WBC Thousand/uL 4 64 3 38*   HEMOGLOBIN g/dL 10 5* 10 2*   HEMATOCRIT % 33 4* 31 3*   PLATELETS Thousands/uL 106* 83*   NEUTROS PCT %  --  51   LYMPHS PCT %  --  26   MONOS PCT % --  20*   EOS PCT %  --  1     Results from last 7 days   Lab Units 02/22/19  0505  02/17/19  1825  02/16/19  1255   POTASSIUM mmol/L 3 1*   < >  --    < > 4 7   CHLORIDE mmol/L 108   < >  --    < > 99*   CO2 mmol/L 29   < >  --    < > 23   CO2, I-STAT mmol/L  --   --  17*  --   --    BUN mg/dL 21   < >  --    < > 23   CREATININE mg/dL 1 03   < >  --    < > 1 95*   CALCIUM mg/dL 8 6   < >  --    < > 9 3   ALK PHOS U/L  --   --   --   --  86   ALT U/L  --   --   --   --  <6*   AST U/L  --   --   --   --  19   GLUCOSE, ISTAT mg/dl  --   --  148*  --   --     < > = values in this interval not displayed  Results from last 7 days   Lab Units 02/17/19  1552   INR  1 10       * I Have Reviewed All Lab Data Listed Above  * Additional Pertinent Lab Tests Reviewed: All Labs Within Last 24 Hours Reviewed    Imaging:    Imaging Reports Reviewed Today Include:   Imaging Personally Reviewed by Myself Includes:      Recent Cultures (last 7 days):     Results from last 7 days   Lab Units 02/18/19  0744 02/17/19  1728 02/16/19  1415 02/16/19  1323 02/16/19  1320   BLOOD CULTURE  No Growth at 72 hrs    No Growth at 72 hrs   --  Enterococcus faecalis* Enterococcus faecalis*  --    GRAM STAIN RESULT   --   --  Gram positive cocci in pairs and chains* Gram positive cocci in pairs and chains*  --    URINE CULTURE   --  >100,000 cfu/ml Enterococcus faecalis*  --   --  60,000-69,000 cfu/ml Enterococcus faecalis*       Last 24 Hours Medication List:     Current Facility-Administered Medications:  acetaminophen 650 mg Rectal Q6H PRN Nafisa Manuel MD    ampicillin 2,000 mg Intravenous Q6H Jo-Ann Pugh MD Last Rate: 2,000 mg (02/22/19 1108)   aspirin 81 mg Oral QPM Nafisa Manuel MD    atorvastatin 80 mg Oral QPM Nafisa Manuel MD    carbidopa-levodopa 1 tablet Oral HS Elner Ocean, MD    carbidopa-levodopa 1 tablet Oral TID Radha Ocean, MD    chlorhexidine 15 mL Swish & Spit Q12H [facility] Sherrill García MD    vitamin B-12 100 mcg Oral Daily Sherrill García MD    heparin (porcine) 5,000 Units Subcutaneous Q8H [facility] Sherrill García MD    insulin lispro 1-6 Units Subcutaneous Q6H [facility] Sherrill García MD    ondansetron 4 mg Intravenous Q6H PRN Sherrill García MD    potassium chloride 20 mEq Intravenous Q2H Sherrill García MD    rOPINIRole 0 75 mg Oral TID Sherrill García MD         Today, Patient Was Seen By: Sherrill García MD    ** Please Note: This note has been constructed using a voice recognition system   **

## 2019-02-22 NOTE — SOCIAL WORK
CM met with patient, wife and dtr-in-law-vivek Smyth states she is patient's caregiver and would like to see patient out of bed before she can decide on patient's discharge plan to home or STR   Vivek informed patient is current with [facility]  And Patricio Smyth provided STR choices for referrals  CM notified PT CM sent referrals to STRs chosen  Nurse and SLIM notified

## 2019-02-22 NOTE — DISCHARGE INSTRUCTIONS
How to Care for Your PEG Tube     AMBULATORY CARE:   A percutaneous endoscopic gastrostomy (PEG) tube is a plastic tube that is put into your stomach through your skin  PEG tubes are most often used to give you food or liquids if you are not able to eat or drink  Medical formula, medicines, and water can be given through a PEG tube  After your procedure you may resume your regular diet  Start with clear liquids and advance as tolerated  Small sips of flat soda will help with nausea  Your tube can be used in 24 hours  Contact Interventional Radiology at [phone] [facility] PATIENTS: Contact Interventional Radiology at [phone]) [facility] PATIENTS: Contact Interventional Radiology at [phone]) if any of the following occur:  · You have severe abdominal pain  · You have persistent nausea or vomiting  · Blood or tube feeding fluid leaks from the PEG tube site  · Your PEG tube is shorter than it was when it was put in    · Your PEG tube comes out  · You have discomfort or pain around your PEG tube site  · The skin around your PEG tube is red, swollen, or draining pus  · Your T tacks do not fall off  T tacks should fall off within four weeks of the peg tube placement  How to use your PEG tube: Your healthcare provider will tell you when and how often to use your PEG tube for feedings  How to care for the skin around your PEG tube:   · Do not remove the T tacks they will fall off in 3 weeks time, they hold your PEG tube in place when you first get it  Leave clean bandages over the tube area for the first 24 hours after the tube is put in  You may not need to use bandages after 24 hours if the skin around the tube looks dry  Ask when you can shower or bathe  · Routine skin care:    ¨ Clean the skin around your tube 1 to 2 times each day  Ask your healthcare provider what you should use to clean your skin  Check for redness and swelling in the area where the tube goes into your body  Check for fluid draining from your stoma (the hole where the tube was put in)  ¨ Keep the skin around your PEG tube dry  This will help prevent skin irritation and infection              Nephrostomy Tube Care     WHAT YOU NEED TO KNOW:   A nephrostomy tube is a catheter (thin plastic tube) that is inserted through your skin and into your kidney  The nephrostomy tube drains urine from your kidney into a collecting bag outside your body  You may need a nephrostomy tube when something is blocking the normal flow of urine  A nephrostomy tube may be used for a short or a long period of time  The nephrostomy tube comes out of your back, so you will need someone to help care for your nephrostomy tube  DISCHARGE INSTRUCTIONS:      How to clean the skin around the nephrostomy tube and change the bandage:  Since the nephrostomy tube comes out of your back, you will not be able to care for it by yourself  Ask someone to follow the general directions below to check and care for your nephrostomy tube  Gather the items you will need  Disposable (single use) under-pad, and a clean washcloth  ¨ Plain soap, warm water, and new medical gloves  ¨ Sterile gauze bandages  ¨ Clear adhesive dressing or medical tape  ¨ Skin barrier  ¨ Protective skin film  ¨ Trash bag  · Remove the old bandage, and check the tube entry site  ¨ Have the patient lie on his side with the nephrostomy tube entry site facing up  Place the under-pad where it will catch drainage as you are working with the nephrostomy tube  ¨ Wash your hands with soap and water  Put on new medical gloves  ¨ Gently remove the old bandage, without pulling on the tube  Do this by holding the skin beside the tube with one hand  With the other hand, gently remove sticky tape and the skin barrier by pulling in the same direction as hair growth  Do not touch the side of the bandage that is placed over or around the tube   Throw the bandage and skin barrier away in a trash bag  ¨ Look for signs of infection, such as skin redness and swelling  Report any skin changes to healthcare providers  ¨ Clean the tube entry site  ¨ Hold the tube in place to keep it from being pulled out while you are cleaning around it  ¨ You will need to clean the area twice  For the first cleaning, wet a new gauze bandage with soap and water  Begin at the entry site of the tube  Wipe the skin in circles, moving away from the entry site  Remove blood and any other material with the gauze  Do this as often as needed  Use a new gauze bandage each time you clean the area, moving away from the entry site  ¨ For the second cleaning, wet a new gauze bandage with water  Begin at the entry site of the tube  Wipe the skin in circles, moving away from the entry site  Use a new gauze bandage each time you clean the area, moving away from the entry site  ¨ Gently pat the skin with a clean washcloth to dry it  · Apply the skin barrier and bandages  ¨ Roll up a bandage to make it thick, and place it under  the place where the tube enters the skin  Place it to support the tube, and stop it from kinking or bending  Tape the bandage in place, and apply more bandages if directed by a healthcare provider  ¨ Bring the tubing forward to the front and tape it to the skin  Do not stretch the tube tight, because this may pull the nephrostomy tube out  How often to change the bandage  Change the bandage around the tube, every other day  If your bandages  get dirty or wet, change them right away, and as often as needed  If your nephrostomy tube is to be used for a long period of time, the tube needs to be changed every 2 to 3 months  Healthcare providers will tell you when you need to make an appointment to have your tube changed  How to care for the urine drainage bag:   · Ask if you need to measure and write down how much urine is in the bag before you empty it   Drain urine out of the drainage bag when it is ½ to ? full  Open the spout at the bottom of the bag to empty the urine into the toilet  · You may need to detach the drainage bag from the nephrostomy tube to change it    If so, attach a new drainage bag tightly to the nephrostomy tube  ·   How to prevent problems with your nephrostomy tube:   · Change bandages, directed  This helps to prevent infection  Throw away or clean your drainage bag as directed by your healthcare provider  · Wipe the connecting ends of the drainage bag with alcohol before you reconnect the bag to the tube  This helps prevent infection  Keep the tube taped to your skin and connected to a drainage bag placed below the level of your kidneys  This helps prevent urine from backing up into your kidneys  You may wear a small drainage bag strapped to your leg to let you move around more easily  · Check the catheter to be sure it is in place after you change your clothes or do other activities  Do not wear tight clothing over the tube  Place the tubing over your thigh rather than under it when you are sitting down  Be sure that nothing is pulling on the nephrostomy tube when you move around  · Change positions if you see little or no urine in your drainage bag  Check to see if the urine tube is twisted or bent  Be sure that you are not sitting or lying on the tube  If there are no kinks and there is little or no urine in the drainage bag, tell your healthcare provider  · Flush out the tube as directed  Some tubes get flushed one time a day with 10 mls of NSS You will be given a prescription for the flushes  To flush the nephrostomy tube, clean both connections with alcohol swap  Twist off the drainage bag tube and twist the saline syringe into the nephrostomy tube and flush briskly  Remove the syringe and twist the drainage bag tube back into the nephrostomy tube  · Keep the site covered while you shower    Tape a piece of clear adhesive plastic over the dressing to keep it dry while you shower  Do not take tub baths  Contact Interventional Radiology at [phone] [place] PATIENTS: Contact Interventional Radiology at [phone]) [facility] PATIENTS: Contact Interventional Radiology at [phone]) if:  · The skin around the nephrostomy tube is red, swollen, itches, or has a rash  · You have a fever greater than 101 or chills  · You have lower back or hip pain  · There are changes in how your urine looks or smells  · You have little or no urine draining from the nephrostomy tube  · You have nausea and are vomiting  · The black sammy on your tube has moved, or the tube is longer than when it was put in    · You have questions or concerns about your condition or care  · The nephrostomy tube comes out completely  · There is blood, pus, or a bad smell coming from the place where the tube enters your skin  · Urine is leaking around the tube  The following pharmacies carry the flush syringes  [facility]                     [facility]       [address]  Phone [phone]            Phone [phone]  [address] & [facility]                      [address]                                 [phone]  Phone [phone]            Phone [phone]    [facility]                                                                         [facility] [phone]  [address]   Phone [phone]

## 2019-02-22 NOTE — PHYSICAL THERAPY NOTE
Physical Therapy Cancellation Note    Chart review performed  At this time, PT treatment session cancelled as pt unavailable, off floor @ IR for PEG tube  PT will follow and provide PT interventions as appropriate      Ariella Owens, PT, DPT

## 2019-02-22 NOTE — PROGRESS NOTES
Patient arrived to IR for gastrostomy placement    The procedure and risks were discussed with the patient's son and daughter  All questions were answered  Informed consent was obtained

## 2019-02-22 NOTE — PLAN OF CARE
Problem: DISCHARGE PLANNING - CARE MANAGEMENT  Goal: Discharge to post-acute care or home with appropriate resources  Description  INTERVENTIONS:  - Conduct assessment to determine patient/family and health care team treatment goals, and need for post-acute services based on payer coverage, community resources, and patient preferences, and barriers to discharge  - Address psychosocial, clinical, and financial barriers to discharge as identified in assessment in conjunction with the patient/family and health care team  - Arrange appropriate level of post-acute services according to patient?s   needs and preference and payer coverage in collaboration with the physician and health care team  - Communicate with and update the patient/family, physician, and health care team regarding progress on the discharge plan  - Arrange appropriate transportation to post-acute venues  Outcome: Progressing   CM met with patient, wife and dtr-in-law-vivek Alex states she is patient's caregiver and would like to see patient out of bed before she can decide on patient's discharge plan to home or STR   Vivek informed patient is current with [facility]  And Gabriela Alex provided STR choices for referrals  CM notified PT CM sent referrals to STRs chosen  Nurse and SLIM notified

## 2019-02-22 NOTE — ANESTHESIA POSTPROCEDURE EVALUATION
Post-Op Assessment Note    CV Status:  Stable    Pain management: adequate     Mental Status:  Somnolent   Hydration Status:  Euvolemic   PONV Controlled:  Controlled   Airway Patency:  Patent   Post Op Vitals Reviewed: Yes      Staff: CRNA           /69(Simultaneous filing  User may not have seen previous data ) (02/22/19 1618)    Temp 98 4 °F (36 9 °C)(Simultaneous filing  User may not have seen previous data ) (02/22/19 1618)    Pulse 63(Simultaneous filing  User may not have seen previous data ) (02/22/19 1618)   Resp 15(Simultaneous filing  User may not have seen previous data ) (02/22/19 1618)    SpO2 97 %(Simultaneous filing   User may not have seen previous data ) (02/22/19 1618)

## 2019-02-22 NOTE — PROGRESS NOTES
Pt NPO for potential PEG placement today  Recommend Jevity 1 2 @ 10ml/h, increasing as tolerated to goal of 75ml/h with 150ml H20 flush q 6h to provide 2160kcal, 100g protein, 2058ml total fluid  Will continue to monitor plan of care and any further diet education needs

## 2019-02-22 NOTE — OCCUPATIONAL THERAPY NOTE
OCCUPATIONAL THERAPY TREATMENT  NOTE         02/22/19 1015   Restrictions/Precautions   Weight Bearing Precautions Per Order No   Other Precautions Chair Alarm;Multiple lines;Telemetry   Pain Assessment   Pain Assessment No/denies pain   Pain Score No Pain   ADL   Where Assessed Supine, bed   Grooming Assistance 1  Total Assistance  (Wydennise Abiodunchristiano stated pt had been bathed  All info from Aide )   [address] 1  Total Assistance   LB Bathing Assistance 1  Total Assistance   Functional Standing Tolerance   Time unable to assess   Bed Mobility   Rolling R 2  Maximal assistance   Additional items Assist x 2;HOB elevated; Bedrails; Increased time required;Verbal cues;LE management   Rolling L 2  Maximal assistance   Additional items Assist x 2;HOB elevated; Bedrails; Increased time required;Verbal cues;LE management   Transfers   Sit to Stand Unable to assess  (pt legergic and poorly responsive)   Additional Comments no OOB mobility attempted due to extreme fatigue   Cognition   Overall Cognitive Status WFL   Arousal/Participation Persistent stimuli required  (Lethargic/ poor tolerance)   Attention Difficulty attending to directions   Memory Within functional limits   Following Commands Follows one step commands with increased time or repetition   Comments Pt agreeable to skilled OT tx   Additional Activities   Additional Activities Other (Comment)   Additional Activities Comments ed on hand placement during bed mobility   Activity Tolerance   Activity Tolerance Patient limited by fatigue   Medical Staff Made Aware Yes , ELISABETH Dutton made aware   Assessment   Assessment Pt agreeable to skilled OT services with focus on improving functional mobility and self care skills, however pt presents as lethargic  RNKarin stated that pt scheduled for PEG tube placement this afternoon  Pt requires continuous stimuli to remain engaged in activity   Pt supine in bed upon start of session with Alessandro hamilton stating that pt had just been bathed requiring TA +2 for ADL with no attempt made by pt to,assist with task  Pt able to follow one-step direction with encouragement to engage  Log roll technique side <>side with Max A and placement of hand onto bedrail   Pt demonstrated  poor activity  tolerance during presented tasks requiring continuous stimuli   Pt unable to perform OOB mobility at this time due to fatigue  Rudy Weems Pt performance demonstrated poor carryover of learned techniques and strategies to facilitate safety during self care and daily routine  Pt continues to demonstrate deficits in the areas of self care and functional mobility and  would continue to benefit from skilled OT POC to facilitate return to PLOF and reduced BOC for caregivers  Plan   Treatment Interventions ADL retraining;Visual perceptual retraining;Functional transfer training;UE strengthening/ROM; Endurance training;Cognitive reorientation;Patient/family training;Equipment evaluation/education; Fine motor coordination activities; Compensatory technique education;Continued evaluation; Energy conservation; Activityengagement   Goal Expiration Date 03/04/19   OT Frequency 3-5x/wk   Recommendation   OT Discharge Recommendation Short Term Rehab   OT - OK to Discharge Yes  (when medically cleared)                                                     KENNEY Ellington/MURPHY

## 2019-02-22 NOTE — PROGRESS NOTES
Progress Note - Infectious Disease   Bladimir Perches  [de-identified] y o  male MRN: [id number]  Unit/Bed#: -01 Encounter: [id number]      Impression/Recommendations:  1   Septic shock, POA   Fever, leukocytosis, refractory hypotension   Due to # 2/3   Clinically improved, now off pressors  Rec:  ? Continue antibiotics as below  ? Follow temperatures closely  ? Supportive care as per the primary service     2   Enterococcus bacteremia  Nonda Lars due to #3   Repeat blood cultures negative x48 hours   TTE technically difficult but negative for obvious vegetation  Rec:  ? Continue ampicillin for 2 weeks IV antibiotics through 3/2/19  ? Check weekly CBC with diff and creatinine while on IV antibiotics  ? OK from ID perspective for PICC line placement at any time     3   Obstructive Enterococcus pyelonephritis  [place] to right ureteral stone   Status post Ellis, PCN   Clinically improving  Rec:  ? Continue antibiotics as above  ? Continue Ellis and PCN per Urology with close follow-up ongoing     4   AIDA   Likely secondary to sepsis, obstruction   Improved  Rec:  ? Follow creatinine closely and dose-adjust antibiotics as indicated     5   Status post ICD    His repeat blood cultures negative and has alternative source for enterococcal bacteremia, low suspicion for endocarditis or ICD infection  Rec:  ? Continue antibiotics as above     6   Parkinson's with history of CVA      7   Disposition  Debilitated with swallowing difficulties and needs IV antibiotics  Rec:  ? Chica Joshi STR at [facility] at D/C     The patient is stable from an ID standpoint  I will reassess the patient on Monday 2/25  Please call in the interim with new questions      Antibiotics:  Ampicillin #5  Antibiotics #6    Subjective:  Patient seen on AM rounds  Denies fevers, chills, sweats, nausea, vomiting, or diarrhea  24 Hour Events:  No documented fevers, chills, sweats, nausea, vomiting, or diarrhea      Objective:  Vitals:  Temp:  [97 34 °F (36 3 °C)-97 5 °F (36 4 °C)] 97 34 °F (36 3 °C)  HR:  [48-82] 82  Resp:  [17-18] 18  BP: (125-126)/(72-75) 125/75  SpO2:  [89 %-97 %] 97 %  Temp (24hrs), Av 4 °F (36 3 °C), Min:97 34 °F (36 3 °C), Max:97 5 °F (36 4 °C)  Current: Temperature: (!) 97 34 °F (36 3 °C)    Physical Exam:   General:  No acute distress  Psychiatric:  Awake and alert  Pulmonary:  Normal respiratory excursion without accessory muscle use  Abdomen:  Soft, nontender  Extremities:  No edema  Skin:  No rashes    Lab Results:  I have personally reviewed pertinent labs  Results from last 7 days   Lab Units 19  0505 19  0649 19  0458 19  1825  19  1255   POTASSIUM mmol/L 3 1* 3 4* 4 4  --    < > 4 7   CHLORIDE mmol/L 108 105 102  --    < > 99*   CO2 mmol/L 29 23 22  --    < > 23   CO2, I-STAT mmol/L  --   --   --  17*  --   --    BUN mg/dL 21 25 24  --    < > 23   CREATININE mg/dL 1 03 1 15 1 96*  --    < > 1 95*   EGFR ml/min/1 73sq m 68 60 31  --    < > 32   GLUCOSE, ISTAT mg/dl  --   --   --  148*  --   --    CALCIUM mg/dL 8 6 8 3 8 9  --    < > 9 3   AST U/L  --   --   --   --   --  19   ALT U/L  --   --   --   --   --  <6*   ALK PHOS U/L  --   --   --   --   --  86    < > = values in this interval not displayed  Results from last 7 days   Lab Units 19  0505 19  0649 19  0431   WBC Thousand/uL 4 64 3 38* 3 54*   HEMOGLOBIN g/dL 10 5* 10 2* 8 7*   PLATELETS Thousands/uL 106* 83* 73*     Results from last 7 days   Lab Units 19  1312 19  0744 19  1728 19  1415 19  1323 19  1320   BLOOD CULTURE   --  No Growth at 72 hrs    No Growth at 72 hrs   --  Enterococcus faecalis* Enterococcus faecalis*  --    GRAM STAIN RESULT   --   --   --  Gram positive cocci in pairs and chains* Gram positive cocci in pairs and chains*  --    URINE CULTURE   --   --  >100,000 cfu/ml Enterococcus faecalis*  --   --  60,000-69,000 cfu/ml Enterococcus faecalis*   MRSA CULTURE ONLY  No Methicillin Resistant Staphlyococcus aureus (MRSA) isolated  --   --   --   --   --        Imaging Studies:   I have personally reviewed pertinent imaging study reports and images in PACS  EKG, Pathology, and Other Studies:   I have personally reviewed pertinent reports

## 2019-02-22 NOTE — BRIEF OP NOTE (RAD/CATH)
IR PEG/GJ TUBE PLACEMENT  Procedure Note    PATIENT NAME: Oleg Frazier  : 1940  MRN: [id number]     Pre-op Diagnosis:   1  SIRS (systemic inflammatory response syndrome) ([facility])    2  AIDA (acute kidney injury) ([facility] [address] )    3  Hydronephrosis with urinary obstruction due to renal calculus    4  Bacteremia    5  Parkinson's disease with use of electrical brain stimulation ([facility])      Post-op Diagnosis:   1  SIRS (systemic inflammatory response syndrome) ([facility])    2  AIDA (acute kidney injury) ([facility] [address] )    3  Hydronephrosis with urinary obstruction due to renal calculus    4  Bacteremia    5   Parkinson's disease with use of electrical brain stimulation [facility])        Surgeon:   Umm Kilgore DO  Assistants:     No qualified resident was available, Resident is only observing    Estimated Blood Loss: minimal    Findings: 12 [place] gastrostomy placement    Specimens: none    Complications:  None apparent    Anesthesia: Local and MAC    Umm Kilgore DO     Date: 2019  Time: 4:17 PM

## 2019-02-23 LAB
ANION GAP SERPL CALCULATED.3IONS-SCNC: 11 MMOL/L (ref 4–13)
BACTERIA BLD CULT: NORMAL
BACTERIA BLD CULT: NORMAL
BUN SERPL-MCNC: 20 MG/DL (ref 5–25)
CALCIUM SERPL-MCNC: 8.6 MG/DL (ref 8.3–10.1)
CHLORIDE SERPL-SCNC: 108 MMOL/L (ref 100–108)
CO2 SERPL-SCNC: 27 MMOL/L (ref 21–32)
CREAT SERPL-MCNC: 0.98 MG/DL (ref 0.6–1.3)
GFR SERPL CREATININE-BSD FRML MDRD: 73 ML/MIN/1.73SQ M
GLUCOSE SERPL-MCNC: 108 MG/DL (ref 65–140)
GLUCOSE SERPL-MCNC: 116 MG/DL (ref 65–140)
GLUCOSE SERPL-MCNC: 123 MG/DL (ref 65–140)
GLUCOSE SERPL-MCNC: 136 MG/DL (ref 65–140)
POTASSIUM SERPL-SCNC: 3.6 MMOL/L (ref 3.5–5.3)
SODIUM SERPL-SCNC: 146 MMOL/L (ref 136–145)

## 2019-02-23 PROCEDURE — 99232 SBSQ HOSP IP/OBS MODERATE 35: CPT | Performed by: FAMILY MEDICINE

## 2019-02-23 PROCEDURE — 82948 REAGENT STRIP/BLOOD GLUCOSE: CPT

## 2019-02-23 PROCEDURE — 80048 BASIC METABOLIC PNL TOTAL CA: CPT | Performed by: FAMILY MEDICINE

## 2019-02-23 RX ADMIN — VITAM B12 100 MCG: 100 TAB at 10:15

## 2019-02-23 RX ADMIN — HEPARIN SODIUM 5000 UNITS: 5000 INJECTION, SOLUTION INTRAVENOUS; SUBCUTANEOUS at 23:48

## 2019-02-23 RX ADMIN — ATORVASTATIN CALCIUM 80 MG: 40 TABLET, FILM COATED ORAL at 17:56

## 2019-02-23 RX ADMIN — CARBIDOPA AND LEVODOPA 1 TABLET: 25; 100 TABLET ORAL at 10:15

## 2019-02-23 RX ADMIN — AMPICILLIN SODIUM 2000 MG: 2 INJECTION, POWDER, FOR SOLUTION INTRAMUSCULAR; INTRAVENOUS at 17:56

## 2019-02-23 RX ADMIN — AMPICILLIN SODIUM 2000 MG: 2 INJECTION, POWDER, FOR SOLUTION INTRAMUSCULAR; INTRAVENOUS at 11:09

## 2019-02-23 RX ADMIN — CHLORHEXIDINE GLUCONATE 0.12% ORAL RINSE 15 ML: 1.2 LIQUID ORAL at 23:48

## 2019-02-23 RX ADMIN — ROPINIROLE 0.75 MG: 0.25 TABLET, FILM COATED ORAL at 15:53

## 2019-02-23 RX ADMIN — ASPIRIN 81 MG 81 MG: 81 TABLET ORAL at 17:56

## 2019-02-23 RX ADMIN — HEPARIN SODIUM 5000 UNITS: 5000 INJECTION, SOLUTION INTRAVENOUS; SUBCUTANEOUS at 05:44

## 2019-02-23 RX ADMIN — CHLORHEXIDINE GLUCONATE 0.12% ORAL RINSE 15 ML: 1.2 LIQUID ORAL at 11:09

## 2019-02-23 RX ADMIN — ROPINIROLE 0.75 MG: 0.25 TABLET, FILM COATED ORAL at 23:47

## 2019-02-23 RX ADMIN — HEPARIN SODIUM 5000 UNITS: 5000 INJECTION, SOLUTION INTRAVENOUS; SUBCUTANEOUS at 15:54

## 2019-02-23 RX ADMIN — ROPINIROLE 0.75 MG: 0.25 TABLET, FILM COATED ORAL at 10:15

## 2019-02-23 RX ADMIN — CARBIDOPA AND LEVODOPA 1 TABLET: 25; 100 TABLET ORAL at 23:47

## 2019-02-23 RX ADMIN — CARBIDOPA AND LEVODOPA 1 TABLET: 25; 100 TABLET ORAL at 15:53

## 2019-02-23 NOTE — PROGRESS NOTES
IV access leaking, 2 unsuccessful attempts made to obtain IV access  Endorsed to day shift  ABT to be administered after access is obtained

## 2019-02-23 NOTE — PROGRESS NOTES
Progress Note - Paige Gudino  1940, [de-identified] y o  male MRN: [id number]    Unit/Bed#: -Ryan Encounter: [id number]    Primary Care Provider: Emmie Hernandez MD   Date and time admitted to hospital: 2/16/2019 12:44 PM        * Sepsis [facility])  Assessment & Plan  · Improved  · Present on admission evident by fever leukocytosis CT imaging findings of a hydronephrosis with renal calculi at the UPJ junction  · Continue IV antibiotic  · Status post PEG Tube    Bacteremia  Assessment & Plan  · Continue Ampicillin as per ID Recommendation    Hydronephrosis with urinary obstruction due to renal calculus  Assessment & Plan  · Status post nephrostomy tube  ·     Ambulatory dysfunction  Assessment & Plan  · PT OT evaluation recommended shortness rehab     History of CVA (cerebrovascular accident)  Assessment & Plan  Continue secondary prevention    HTN (hypertension)  Assessment & Plan  · Patient has been on the hypotensive side this is likely in setting of sepsis  · Continue to monitor  · Continue IVF    Type 2 diabetes mellitus [facility])  Assessment & Plan  Lab Results   Component Value Date    HGBA1C 6 9 (H) 09/19/2015       Recent Labs     02/22/19  1727 02/22/19  2050 02/23/19  0545 02/23/19  1139   POCGLU 158* 122 123 116       Blood Sugar Average: Last 72 hrs:  (P) 145 [id number]     Hold metformin  Continue SSI and Lantus    Weakness generalized  Assessment & Plan  Some improvement  Need placement     Parkinson's disease with use of electrical brain stimulation (HCC)  Assessment & Plan  · Continue Sinemet       VTE Pharmacologic Prophylaxis:   Pharmacologic: Heparin  Mechanical VTE Prophylaxis in Place: Yes    Patient Centered Rounds: I have performed bedside rounds with nursing staff today  Discussions with Specialists or Other Care Team Provider:     Education and Discussions with Family / Patient: patient and family member  Time Spent for Care: 20 minutes    More than 50% of total time spent on counseling and coordination of care as described above  Current Length of Stay: 7 day(s)    Current Patient Status: Inpatient   Certification Statement: The patient will continue to require additional inpatient hospital stay due to Acute above condition on IV antibiotic, pending placement    Discharge Plan:  Depends on placement    Code Status: Level 3 - DNAR and DNI      Subjective:   Patient reported that he is feeling better  There is no sign of acute distress    Objective:     Vitals:   Temp (24hrs), Av 8 °F (37 1 °C), Min:98 4 °F (36 9 °C), Max:99 5 °F (37 5 °C)    Temp:  [98 4 °F (36 9 °C)-99 5 °F (37 5 °C)] 99 5 °F (37 5 °C)  HR:  [63-79] 72  Resp:  [10-18] 18  BP: (133-145)/() 133/77  SpO2:  [95 %-98 %] 95 %  Body mass index is 27 21 kg/m²  Input and Output Summary (last 24 hours): Intake/Output Summary (Last 24 hours) at 2019 1233  Last data filed at 2019 0500  Gross per 24 hour   Intake 360 ml   Output 700 ml   Net -340 ml       Physical Exam:     Physical Exam   Constitutional: He is oriented to person, place, and time  He has a sickly appearance  No distress  Chronically ill appearance   Cardiovascular: Regular rhythm, normal heart sounds and intact distal pulses  Exam reveals no gallop and no friction rub  No murmur heard  Pulmonary/Chest: No stridor  No respiratory distress  He has no wheezes  He has no rales  Abdominal: Soft  He exhibits no distension and no mass  There is no tenderness  There is no guarding  Peg tube in place  No discharge or bleeding   Genitourinary:   Genitourinary Comments: Ellis catheter in place   Musculoskeletal: He exhibits no edema or deformity  Decreased range of motion   Neurological: He is alert and oriented to person, place, and time  A cranial nerve deficit is present  Coordination abnormal    Oriented x1   Skin: Skin is warm  Capillary refill takes 2 to 3 seconds  He is not diaphoretic  There is pallor     Psychiatric: He has a normal mood and affect  Additional Data:     Labs:    Results from last 7 days   Lab Units 02/22/19  0505 02/20/19  0649   WBC Thousand/uL 4 64 3 38*   HEMOGLOBIN g/dL 10 5* 10 2*   HEMATOCRIT % 33 4* 31 3*   PLATELETS Thousands/uL 106* 83*   NEUTROS PCT %  --  51   LYMPHS PCT %  --  26   MONOS PCT %  --  20*   EOS PCT %  --  1     Results from last 7 days   Lab Units 02/23/19  1158  02/16/19  1255   SODIUM mmol/L 146*   < > 135*   POTASSIUM mmol/L 3 6   < > 4 7   CHLORIDE mmol/L 108   < > 99*   CO2 mmol/L 27   < > 23   CO2, I-STAT   --    < >  --    BUN mg/dL 20   < > 23   CREATININE mg/dL 0 98   < > 1 95*   ANION GAP mmol/L 11   < > 13   CALCIUM mg/dL 8 6   < > 9 3   ALBUMIN g/dL  --   --  2 9*   TOTAL BILIRUBIN mg/dL  --   --  2 40*   ALK PHOS U/L  --   --  86   ALT U/L  --   --  <6*   AST U/L  --   --  19   GLUCOSE RANDOM mg/dL 136   < > 138    < > = values in this interval not displayed  Results from last 7 days   Lab Units 02/17/19  1552   INR  1 10     Results from last 7 days   Lab Units 02/23/19  1139 02/23/19  0545 02/22/19  2050 02/22/19  1727 02/22/19  1104 02/22/19  0604 02/22/19  0020 02/21/19  2050 02/21/19  1616 02/21/19  1147 02/21/19  0630 02/21/19  0008   POC GLUCOSE mg/dl 116 123 122 158* 141* 157* 152* 180* 152* 171* 143* 142*         Results from last 7 days   Lab Units 02/18/19  0912 02/18/19  0458 02/17/19  1502 02/16/19  1323   LACTIC ACID mmol/L 2 1*  --   --  3 3*   PROCALCITONIN ng/ml  --  2 13* 0 81*  --            * I Have Reviewed All Lab Data Listed Above  * Additional Pertinent Lab Tests Reviewed: All Labs Within Last 24 Hours Reviewed    Imaging:    Imaging Reports Reviewed Today Include:   Imaging Personally Reviewed by Myself Includes:      Recent Cultures (last 7 days):     Results from last 7 days   Lab Units 02/18/19  0744 02/17/19  1728 02/16/19  1415 02/16/19  1323 02/16/19  1320   BLOOD CULTURE  No Growth After 5 Days  No Growth After 5 Days    --  Enterococcus faecalis* Enterococcus faecalis*  --    GRAM STAIN RESULT   --   --  Gram positive cocci in pairs and chains* Gram positive cocci in pairs and chains*  --    URINE CULTURE   --  >100,000 cfu/ml Enterococcus faecalis*  --   --  60,000-69,000 cfu/ml Enterococcus faecalis*       Last 24 Hours Medication List:     Current Facility-Administered Medications:  acetaminophen 650 mg Rectal Q6H PRN Santana Manuel MD    ampicillin 2,000 mg Intravenous Q6H Stephanie Dumont MD Last Rate: 2,000 mg (02/23/19 1109)   aspirin 81 mg Oral QPM Nafisa Manuel MD    atorvastatin 80 mg Oral QPM Nafisa Manuel MD    carbidopa-levodopa 1 tablet Oral HS Edy Lynch MD    carbidopa-levodopa 1 tablet Per PEG Tube TID Herb Denise DO    chlorhexidine 15 mL Swish & Spit Q12H [address] Edy Lynch MD    vitamin B-12 100 mcg Oral Daily Edy Lynch MD    heparin (porcine) 5,000 Units Subcutaneous Q8H [address] Edy Lynch MD    insulin lispro 1-6 Units Subcutaneous Q6H [address] Edy Lynch MD    ondansetron 4 mg Intravenous Q6H PRN Edy Lynch MD    rOPINIRole 0 75 mg Per PEG Tube TID Micha Yoder DO         Today, Patient Was Seen By: Edy Lynch MD    ** Please Note: Dictation voice to text software may have been used in the creation of this document   **

## 2019-02-23 NOTE — PLAN OF CARE
Problem: Prexisting or High Potential for Compromised Skin Integrity  Goal: Skin integrity is maintained or improved  Description  INTERVENTIONS:  - Identify patients at risk for skin breakdown  - Assess and monitor skin integrity  - Assess and monitor nutrition and hydration status  - Monitor labs (i e  albumin)  - Assess for incontinence   - Turn and reposition patient  - Assist with mobility/ambulation  - Relieve pressure over bony prominences  - Avoid friction and shearing  - Provide appropriate hygiene as needed including keeping skin clean and dry  - Evaluate need for skin moisturizer/barrier cream  - Collaborate with interdisciplinary team (i e  Nutrition, Rehabilitation, etc )   - Patient/family teaching  Outcome: Progressing     Problem: Potential for Falls  Goal: Patient will remain free of falls  Description  INTERVENTIONS:  - Assess patient frequently for physical needs  -  Identify cognitive and physical deficits and behaviors that affect risk of falls  -  [place] fall precautions as indicated by assessment   - Educate patient/family on patient safety including physical limitations  - Instruct patient to call for assistance with activity based on assessment  - Modify environment to reduce risk of injury  - Consider OT/PT consult to assist with strengthening/mobility  Outcome: Progressing     Problem: Nutrition/Hydration-ADULT  Goal: Nutrient/Hydration intake appropriate for improving, restoring or maintaining nutritional needs  Description  Monitor and assess patient's nutrition/hydration status for malnutrition (ex- brittle hair, bruises, dry skin, pale skin and conjunctiva, muscle wasting, smooth red tongue, and disorientation)  Collaborate with interdisciplinary team and initiate plan and interventions as ordered  Monitor patient's weight and dietary intake as ordered or per policy  Utilize nutrition screening tool and intervene per policy   Determine patient's food preferences and provide high-protein, high-caloric foods as appropriate       INTERVENTIONS:  - Monitor oral intake, urinary output, labs, and treatment plans  - Assess nutrition and hydration status and recommend course of action  - Evaluate amount of meals eaten  - Assist patient with eating if necessary   - Allow adequate time for meals  - Recommend/ encourage appropriate diets, oral nutritional supplements, and vitamin/mineral supplements  - Order, calculate, and assess calorie counts as needed  - Recommend, monitor, and adjust tube feedings based on assessed needs  - Assess need for intravenous fluids  - Provide low sodium nutrition/hydration education as appropriate  - Include patient/family/caregiver in decisions related to nutrition   Outcome: Progressing     Problem: DISCHARGE PLANNING - CARE MANAGEMENT  Goal: Discharge to post-acute care or home with appropriate resources  Description  INTERVENTIONS:  - Conduct assessment to determine patient/family and health care team treatment goals, and need for post-acute services based on payer coverage, community resources, and patient preferences, and barriers to discharge  - Address psychosocial, clinical, and financial barriers to discharge as identified in assessment in conjunction with the patient/family and health care team  - Arrange appropriate level of post-acute services according to patient?s   needs and preference and payer coverage in collaboration with the physician and health care team  - Communicate with and update the patient/family, physician, and health care team regarding progress on the discharge plan  - Arrange appropriate transportation to post-acute venues  Outcome: Progressing

## 2019-02-24 LAB
GLUCOSE SERPL-MCNC: 117 MG/DL (ref 65–140)
GLUCOSE SERPL-MCNC: 119 MG/DL (ref 65–140)
GLUCOSE SERPL-MCNC: 121 MG/DL (ref 65–140)
GLUCOSE SERPL-MCNC: 125 MG/DL (ref 65–140)

## 2019-02-24 PROCEDURE — 82948 REAGENT STRIP/BLOOD GLUCOSE: CPT

## 2019-02-24 PROCEDURE — 99232 SBSQ HOSP IP/OBS MODERATE 35: CPT | Performed by: FAMILY MEDICINE

## 2019-02-24 RX ADMIN — ROPINIROLE 0.75 MG: 0.25 TABLET, FILM COATED ORAL at 17:25

## 2019-02-24 RX ADMIN — CARBIDOPA AND LEVODOPA 1 TABLET: 25; 100 TABLET ORAL at 21:24

## 2019-02-24 RX ADMIN — ATORVASTATIN CALCIUM 80 MG: 40 TABLET, FILM COATED ORAL at 17:25

## 2019-02-24 RX ADMIN — CARBIDOPA AND LEVODOPA 1 TABLET: 25; 100 TABLET ORAL at 17:25

## 2019-02-24 RX ADMIN — AMPICILLIN SODIUM 2000 MG: 2 INJECTION, POWDER, FOR SOLUTION INTRAMUSCULAR; INTRAVENOUS at 00:01

## 2019-02-24 RX ADMIN — AMPICILLIN SODIUM 2000 MG: 2 INJECTION, POWDER, FOR SOLUTION INTRAMUSCULAR; INTRAVENOUS at 21:22

## 2019-02-24 RX ADMIN — CHLORHEXIDINE GLUCONATE 0.12% ORAL RINSE 15 ML: 1.2 LIQUID ORAL at 08:28

## 2019-02-24 RX ADMIN — VITAM B12 100 MCG: 100 TAB at 08:28

## 2019-02-24 RX ADMIN — CARBIDOPA AND LEVODOPA 1 TABLET: 25; 100 TABLET ORAL at 08:29

## 2019-02-24 RX ADMIN — ROPINIROLE 0.75 MG: 0.25 TABLET, FILM COATED ORAL at 08:29

## 2019-02-24 RX ADMIN — AMPICILLIN SODIUM 2000 MG: 2 INJECTION, POWDER, FOR SOLUTION INTRAMUSCULAR; INTRAVENOUS at 06:07

## 2019-02-24 RX ADMIN — HEPARIN SODIUM 5000 UNITS: 5000 INJECTION, SOLUTION INTRAVENOUS; SUBCUTANEOUS at 06:08

## 2019-02-24 RX ADMIN — HEPARIN SODIUM 5000 UNITS: 5000 INJECTION, SOLUTION INTRAVENOUS; SUBCUTANEOUS at 14:11

## 2019-02-24 RX ADMIN — CARBIDOPA AND LEVODOPA 1 TABLET: 50; 200 TABLET, EXTENDED RELEASE ORAL at 21:24

## 2019-02-24 RX ADMIN — ASPIRIN 81 MG 81 MG: 81 TABLET ORAL at 17:25

## 2019-02-24 RX ADMIN — AMPICILLIN SODIUM 2000 MG: 2 INJECTION, POWDER, FOR SOLUTION INTRAMUSCULAR; INTRAVENOUS at 14:11

## 2019-02-24 RX ADMIN — CHLORHEXIDINE GLUCONATE 0.12% ORAL RINSE 15 ML: 1.2 LIQUID ORAL at 21:24

## 2019-02-24 RX ADMIN — HEPARIN SODIUM 5000 UNITS: 5000 INJECTION, SOLUTION INTRAVENOUS; SUBCUTANEOUS at 21:24

## 2019-02-24 RX ADMIN — ROPINIROLE 0.75 MG: 0.25 TABLET, FILM COATED ORAL at 21:22

## 2019-02-24 NOTE — PROGRESS NOTES
Progress Note - Aniket Gracia  1940, [de-identified] y o  male MRN: [id number]    Unit/Bed#: -01 Encounter: [id number]    Primary Care Provider: Maia Haley MD   Date and time admitted to hospital: 2/16/2019 12:44 PM        * Sepsis [facility])  Assessment & Plan  · Improved  · Present on admission evident by fever leukocytosis CT imaging findings of a hydronephrosis with renal calculi at the UPJ junction  · Continue IV antibiotic  · Status post PEG Tube  · Continue current nutritional     Bacteremia  Assessment & Plan  · Continue Ampicillin as per ID Recommendation    Hydronephrosis with urinary obstruction due to renal calculus  Assessment & Plan  · Status post nephrostomy tube    Ambulatory dysfunction  Assessment & Plan  · PT OT evaluation recommended shortness rehab     History of CVA (cerebrovascular accident)  Assessment & Plan  Continue secondary prevention    HTN (hypertension)  Assessment & Plan  · Patient has been on the hypotensive side this is likely in setting of sepsis  · Continue to monitor  · Continue IVF    Type 2 diabetes mellitus [facility])  Assessment & Plan  Lab Results   Component Value Date    HGBA1C 6 9 (H) 09/19/2015       Recent Labs     02/23/19  1139 02/23/19  1810 02/24/19  0002 02/24/19  0652   POCGLU 116 108 125 119       Blood Sugar Average: Last 72 hrs:  (P) 140 6     Hold metformin  Continue SSI and Lantus    Weakness generalized  Assessment & Plan  Some improvement  Need placement     Parkinson's disease with use of electrical brain stimulation (HCC)  Assessment & Plan  · Continue Sinemet       VTE Pharmacologic Prophylaxis:   Pharmacologic: Heparin  Mechanical VTE Prophylaxis in Place: Yes    Patient Centered Rounds: I have performed bedside rounds with nursing staff today  Discussions with Specialists or Other Care Team Provider:    Education and Discussions with Family / Patient:  Patient  Time Spent for Care: 20 minutes    More than 50% of total time spent on counseling and coordination of care as described above  Current Length of Stay: 8 day(s)    Current Patient Status: Inpatient   Certification Statement: The patient will continue to require additional inpatient hospital stay due to Acute above conditions    Discharge Plan:  Pending placement    Code Status: Level 3 - DNAR and DNI      Subjective:   Patient reports he is fine  No evidence of any complain or acute distress  Objective:     Vitals:   Temp (24hrs), Av 6 °F (37 °C), Min:98 24 °F (36 8 °C), Max:99 °F (37 2 °C)    Temp:  [98 24 °F (36 8 °C)-99 °F (37 2 °C)] 99 °F (37 2 °C)  HR:  [68-70] 68  Resp:  [17-18] 18  BP: (116-139)/(65-75) 116/65  SpO2:  [91 %-96 %] 91 %  Body mass index is 27 24 kg/m²  Input and Output Summary (last 24 hours): Intake/Output Summary (Last 24 hours) at 2019 7733  Last data filed at 2019 0901  Gross per 24 hour   Intake 170 ml   Output 900 ml   Net -730 ml       Physical Exam:     Physical Exam   Constitutional: He is oriented to person, place, and time  He has a sickly appearance  No distress  Chronically ill appearance   Cardiovascular: Regular rhythm, normal heart sounds and intact distal pulses  Exam reveals no gallop and no friction rub  No murmur heard  Pulmonary/Chest: Effort normal  No stridor  No respiratory distress  He has no wheezes  He has no rales  Abdominal: Soft  He exhibits no distension and no mass  There is no tenderness  There is no guarding  Peg tube in place  No discharge or bleeding   Genitourinary:   Genitourinary Comments: Ellis catheter in place   Musculoskeletal: He exhibits no edema or deformity  Decreased range of motion   Neurological: He is alert and oriented to person, place, and time  A cranial nerve deficit is present  Coordination abnormal    Oriented x1   Skin: Skin is warm  Capillary refill takes 2 to 3 seconds  He is not diaphoretic  There is pallor  Psychiatric: He has a normal mood and affect         Additional Data: Labs:    Results from last 7 days   Lab Units 02/22/19  0505 02/20/19  0649   WBC Thousand/uL 4 64 3 38*   HEMOGLOBIN g/dL 10 5* 10 2*   HEMATOCRIT % 33 4* 31 3*   PLATELETS Thousands/uL 106* 83*   NEUTROS PCT %  --  51   LYMPHS PCT %  --  26   MONOS PCT %  --  20*   EOS PCT %  --  1     Results from last 7 days   Lab Units 02/23/19  1158   SODIUM mmol/L 146*   POTASSIUM mmol/L 3 6   CHLORIDE mmol/L 108   CO2 mmol/L 27   BUN mg/dL 20   CREATININE mg/dL 0 98   ANION GAP mmol/L 11   CALCIUM mg/dL 8 6   GLUCOSE RANDOM mg/dL 136     Results from last 7 days   Lab Units 02/17/19  1552   INR  1 10     Results from last 7 days   Lab Units 02/24/19  0652 02/24/19  0002 02/23/19  1810 02/23/19  1139 02/23/19  0545 02/22/19  2050 02/22/19  1727 02/22/19  1104 02/22/19  0604 02/22/19  0020 02/21/19  2050 02/21/19  1616   POC GLUCOSE mg/dl 119 125 108 116 123 122 158* 141* 157* 152* 180* 152*         Results from last 7 days   Lab Units 02/18/19  0912 02/18/19  0458 02/17/19  1502   LACTIC ACID mmol/L 2 1*  --   --    PROCALCITONIN ng/ml  --  2 13* 0 81*           * I Have Reviewed All Lab Data Listed Above  * Additional Pertinent Lab Tests Reviewed: All Labs Within Last 24 Hours Reviewed    Imaging:    Imaging Reports Reviewed Today Include:   Imaging Personally Reviewed by Myself Includes:      Recent Cultures (last 7 days):     Results from last 7 days   Lab Units 02/18/19  0744 02/17/19  1728   BLOOD CULTURE  No Growth After 5 Days  No Growth After 5 Days    --    URINE CULTURE   --  >100,000 cfu/ml Enterococcus faecalis*       Last 24 Hours Medication List:     Current Facility-Administered Medications:  acetaminophen 650 mg Rectal Q6H PRN Rogerio Manuel MD    ampicillin 2,000 mg Intravenous Q6H Geoffrey Martin MD Last Rate: 2,000 mg (02/24/19 3472)   aspirin 81 mg Oral QPM Nafisa Manuel MD    atorvastatin 80 mg Oral QPM Nafisa Manuel MD    carbidopa-levodopa 1 tablet Oral  Nafisa LOPEZ Jayme Hawk MD    carbidopa-levodopa 1 tablet Per PEG Tube TID Herb Denise DO    chlorhexidine 15 mL Swish & Spit Q12H [facility] Gallo Barcenas MD    vitamin B-12 100 mcg Oral Daily Gallo Barcenas MD    heparin (porcine) 5,000 Units Subcutaneous Q8H [facility] Gallo Barcenas MD    insulin lispro 1-6 Units Subcutaneous Q6H [facility] Gallo Barcenas MD    ondansetron 4 mg Intravenous Q6H PRN Gallo Barcenas MD    rOPINIRole 0 75 mg Per PEG Tube TID Onur Jang DO         Today, Patient Was Seen By: Gallo Barcenas MD    ** Please Note: Dictation voice to text software may have been used in the creation of this document   **

## 2019-02-24 NOTE — PLAN OF CARE
Problem: Prexisting or High Potential for Compromised Skin Integrity  Goal: Skin integrity is maintained or improved  Description  INTERVENTIONS:  - Identify patients at risk for skin breakdown  - Assess and monitor skin integrity  - Assess and monitor nutrition and hydration status  - Monitor labs (i e  albumin)  - Assess for incontinence   - Turn and reposition patient  - Assist with mobility/ambulation  - Relieve pressure over bony prominences  - Avoid friction and shearing  - Provide appropriate hygiene as needed including keeping skin clean and dry  - Evaluate need for skin moisturizer/barrier cream  - Collaborate with interdisciplinary team (i e  Nutrition, Rehabilitation, etc )   - Patient/family teaching  Outcome: Progressing     Problem: Potential for Falls  Goal: Patient will remain free of falls  Description  INTERVENTIONS:  - Assess patient frequently for physical needs  -  Identify cognitive and physical deficits and behaviors that affect risk of falls  -  [place] fall precautions as indicated by assessment   - Educate patient/family on patient safety including physical limitations  - Instruct patient to call for assistance with activity based on assessment  - Modify environment to reduce risk of injury  - Consider OT/PT consult to assist with strengthening/mobility  Outcome: Progressing     Problem: Nutrition/Hydration-ADULT  Goal: Nutrient/Hydration intake appropriate for improving, restoring or maintaining nutritional needs  Description  Monitor and assess patient's nutrition/hydration status for malnutrition (ex- brittle hair, bruises, dry skin, pale skin and conjunctiva, muscle wasting, smooth red tongue, and disorientation)  Collaborate with interdisciplinary team and initiate plan and interventions as ordered  Monitor patient's weight and dietary intake as ordered or per policy  Utilize nutrition screening tool and intervene per policy   Determine patient's food preferences and provide high-protein, high-caloric foods as appropriate       INTERVENTIONS:  - Monitor oral intake, urinary output, labs, and treatment plans  - Assess nutrition and hydration status and recommend course of action  - Evaluate amount of meals eaten  - Assist patient with eating if necessary   - Allow adequate time for meals  - Recommend/ encourage appropriate diets, oral nutritional supplements, and vitamin/mineral supplements  - Order, calculate, and assess calorie counts as needed  - Recommend, monitor, and adjust tube feedings based on assessed needs  - Assess need for intravenous fluids  - Provide low sodium nutrition/hydration education as appropriate  - Include patient/family/caregiver in decisions related to nutrition   Outcome: Progressing     Problem: DISCHARGE PLANNING - CARE MANAGEMENT  Goal: Discharge to post-acute care or home with appropriate resources  Description  INTERVENTIONS:  - Conduct assessment to determine patient/family and health care team treatment goals, and need for post-acute services based on payer coverage, community resources, and patient preferences, and barriers to discharge  - Address psychosocial, clinical, and financial barriers to discharge as identified in assessment in conjunction with the patient/family and health care team  - Arrange appropriate level of post-acute services according to patient?s   needs and preference and payer coverage in collaboration with the physician and health care team  - Communicate with and update the patient/family, physician, and health care team regarding progress on the discharge plan  - Arrange appropriate transportation to post-acute venues  Outcome: Progressing

## 2019-02-25 LAB
GLUCOSE SERPL-MCNC: 194 MG/DL (ref 65–140)
GLUCOSE SERPL-MCNC: 257 MG/DL (ref 65–140)
GLUCOSE SERPL-MCNC: 268 MG/DL (ref 65–140)
GLUCOSE SERPL-MCNC: 301 MG/DL (ref 65–140)

## 2019-02-25 PROCEDURE — 97530 THERAPEUTIC ACTIVITIES: CPT

## 2019-02-25 PROCEDURE — 99232 SBSQ HOSP IP/OBS MODERATE 35: CPT | Performed by: FAMILY MEDICINE

## 2019-02-25 PROCEDURE — 99232 SBSQ HOSP IP/OBS MODERATE 35: CPT | Performed by: INTERNAL MEDICINE

## 2019-02-25 PROCEDURE — 82948 REAGENT STRIP/BLOOD GLUCOSE: CPT

## 2019-02-25 RX ADMIN — HEPARIN SODIUM 5000 UNITS: 5000 INJECTION, SOLUTION INTRAVENOUS; SUBCUTANEOUS at 06:05

## 2019-02-25 RX ADMIN — INSULIN LISPRO 1 UNITS: 100 INJECTION, SOLUTION INTRAVENOUS; SUBCUTANEOUS at 01:29

## 2019-02-25 RX ADMIN — CARBIDOPA AND LEVODOPA 1 TABLET: 50; 200 TABLET, EXTENDED RELEASE ORAL at 21:03

## 2019-02-25 RX ADMIN — AMPICILLIN SODIUM 2000 MG: 2 INJECTION, POWDER, FOR SOLUTION INTRAMUSCULAR; INTRAVENOUS at 21:03

## 2019-02-25 RX ADMIN — INSULIN LISPRO 3 UNITS: 100 INJECTION, SOLUTION INTRAVENOUS; SUBCUTANEOUS at 13:11

## 2019-02-25 RX ADMIN — HEPARIN SODIUM 5000 UNITS: 5000 INJECTION, SOLUTION INTRAVENOUS; SUBCUTANEOUS at 21:03

## 2019-02-25 RX ADMIN — AMPICILLIN SODIUM 2000 MG: 2 INJECTION, POWDER, FOR SOLUTION INTRAMUSCULAR; INTRAVENOUS at 03:16

## 2019-02-25 RX ADMIN — HEPARIN SODIUM 5000 UNITS: 5000 INJECTION, SOLUTION INTRAVENOUS; SUBCUTANEOUS at 16:06

## 2019-02-25 RX ADMIN — INSULIN LISPRO 3 UNITS: 100 INJECTION, SOLUTION INTRAVENOUS; SUBCUTANEOUS at 07:56

## 2019-02-25 RX ADMIN — CHLORHEXIDINE GLUCONATE 0.12% ORAL RINSE 15 ML: 1.2 LIQUID ORAL at 21:02

## 2019-02-25 RX ADMIN — ATORVASTATIN CALCIUM 80 MG: 40 TABLET, FILM COATED ORAL at 17:23

## 2019-02-25 RX ADMIN — CARBIDOPA AND LEVODOPA 1 TABLET: 25; 100 TABLET ORAL at 10:20

## 2019-02-25 RX ADMIN — ROPINIROLE 0.75 MG: 0.25 TABLET, FILM COATED ORAL at 21:03

## 2019-02-25 RX ADMIN — AMPICILLIN SODIUM 2000 MG: 2 INJECTION, POWDER, FOR SOLUTION INTRAMUSCULAR; INTRAVENOUS at 10:29

## 2019-02-25 RX ADMIN — INSULIN LISPRO 4 UNITS: 100 INJECTION, SOLUTION INTRAVENOUS; SUBCUTANEOUS at 17:29

## 2019-02-25 RX ADMIN — CARBIDOPA AND LEVODOPA 1 TABLET: 25; 100 TABLET ORAL at 21:03

## 2019-02-25 RX ADMIN — AMPICILLIN SODIUM 2000 MG: 2 INJECTION, POWDER, FOR SOLUTION INTRAMUSCULAR; INTRAVENOUS at 16:06

## 2019-02-25 RX ADMIN — CARBIDOPA AND LEVODOPA 1 TABLET: 25; 100 TABLET ORAL at 17:25

## 2019-02-25 RX ADMIN — VITAM B12 100 MCG: 100 TAB at 10:21

## 2019-02-25 RX ADMIN — ROPINIROLE 0.75 MG: 0.25 TABLET, FILM COATED ORAL at 17:25

## 2019-02-25 RX ADMIN — ASPIRIN 81 MG 81 MG: 81 TABLET ORAL at 17:23

## 2019-02-25 RX ADMIN — ROPINIROLE 0.75 MG: 0.25 TABLET, FILM COATED ORAL at 10:20

## 2019-02-25 NOTE — SOCIAL WORK
Per Kristin Lala at [facility] [place] unable to accept pt at [place]  [facility] and [facility] also unable to offer a bed  Per Deedee at [facility] she can offer a bed at [facility] and will submit for auth  Discussed with pt's daughter Costella Meckel via telephone who is in agreement  CM department will continue to follow

## 2019-02-25 NOTE — PLAN OF CARE
Problem: PHYSICAL THERAPY ADULT  Goal: Performs mobility at highest level of function for planned discharge setting  See evaluation for individualized goals  Description  Treatment/Interventions: Functional transfer training, LE strengthening/ROM, Therapeutic exercise, Endurance training, Patient/family training, Equipment eval/education, Bed mobility, Gait training, Spoke to nursing, Spoke to case management, Spoke to MD, OT  Equipment Recommended: (TBD)       See flowsheet documentation for full assessment, interventions and recommendations     Outcome: Progressing

## 2019-02-25 NOTE — PROGRESS NOTES
Progress Note - Infectious Disease   Aditya Morrell  [de-identified] y o  male MRN: [id number]  Unit/Bed#: -01 Encounter: [id number]      Impression/Recommendations:  1   Septic shock, POA   Fever, leukocytosis, refractory hypotension   Due to # 2/3   Clinically improved, now off pressors  Rec:  ? Continue antibiotics as below  ? Follow temperatures closely  ? Supportive care as per the primary service     2   Enterococcus bacteremia  Carla Meredith due to #3   Repeat blood cultures negative   TTE technically difficult but negative for obvious vegetation  Rec:  ? Continue ampicillin for 2 weeks IV antibiotics through 3/2/19  ? Check weekly CBC with diff and creatinine while on IV antibiotics  ? OK from ID perspective for PICC line placement at any time if needed for SNF     3   Obstructive Enterococcus pyelonephritis  Hernandez Specking to right ureteral stone   Status post Ellis, PCN   Clinically improving  Rec:  ? Continue antibiotics as above  ? Continue Ellis and PCN per Urology with close follow-up ongoing     4   Parkinson's with history of CVA      The patient is stable from an ID standpoint for D/C to SNF      Antibiotics:  Ampicillin #8  Antibiotics #9    Subjective:  Patient seen on PM rounds  Wife at bedside provides history  States patient offers no complaints  He was just out of bed  24 Hour Events:  Patient underwent PEG tube placement on Friday  No documented fevers, chills, sweats, nausea, vomiting, or diarrhea      Objective:  Vitals:  Temp:  [97 9 °F (36 6 °C)-99 14 °F (37 3 °C)] 97 9 °F (36 6 °C)  HR:  [61-63] 63  Resp:  [16-18] 16  BP: (129-139)/(67-71) 129/67  SpO2:  [94 %-95 %] 95 %  Temp (24hrs), Av 5 °F (36 9 °C), Min:97 9 °F (36 6 °C), Max:99 14 °F (37 3 °C)  Current: Temperature: 97 9 °F (36 6 °C)    Physical Exam:   General:  No acute distress  Psychiatric:  Awake and alert  Pulmonary:  Normal respiratory excursion without accessory muscle use  Abdomen:  Soft, nontender  Extremities:  No edema  Skin:  No rashes    Lab Results:  I have personally reviewed pertinent labs  Results from last 7 days   Lab Units 02/23/19  1158 02/22/19  0505 02/20/19  0649   POTASSIUM mmol/L 3 6 3 1* 3 4*   CHLORIDE mmol/L 108 108 105   CO2 mmol/L 27 29 23   BUN mg/dL 20 21 25   CREATININE mg/dL 0 98 1 03 1 15   EGFR ml/min/1 73sq m 73 68 60   CALCIUM mg/dL 8 6 8 6 8 3     Results from last 7 days   Lab Units 02/22/19  0505 02/20/19  0649 02/19/19  0431   WBC Thousand/uL 4 64 3 38* 3 54*   HEMOGLOBIN g/dL 10 5* 10 2* 8 7*   PLATELETS Thousands/uL 106* 83* 73*     Results from last 7 days   Lab Units 02/18/19  1312   MRSA CULTURE ONLY  No Methicillin Resistant Staphlyococcus aureus (MRSA) isolated       Imaging Studies:   I have personally reviewed pertinent imaging study reports and images in PACS  EKG, Pathology, and Other Studies:   I have personally reviewed pertinent reports

## 2019-02-25 NOTE — PROGRESS NOTES
Progress Note - Roxana Schaumann  1940, [de-identified] y o  male MRN: [id number]    Unit/Bed#: -Ryan Encounter: [id number]    Primary Care Provider: Roque Martinez MD   Date and time admitted to hospital: 2/16/2019 12:44 PM        * Sepsis [facility])  Assessment & Plan  · Improved  · Present on admission evident by fever leukocytosis CT imaging findings of a hydronephrosis with renal calculi at the UPJ junction  · Continue IV antibiotic till 3/02/19  · Status post PEG Tube  · Continue current nutritional     Bacteremia  Assessment & Plan  · Continue Ampicillin as per ID Recommendation    Hydronephrosis with urinary obstruction due to renal calculus  Assessment & Plan  · Status post nephrostomy tube    Ambulatory dysfunction  Assessment & Plan  · PT OT evaluation recommended shortness rehab     HTN (hypertension)  Assessment & Plan  · Patient has been on the hypotensive side this is likely in setting of sepsis  · Continue to monitor  · Continue IVF    Type 2 diabetes mellitus [facility])  Assessment & Plan  Lab Results   Component Value Date    HGBA1C 6 9 (H) 09/19/2015       Recent Labs     02/24/19  1115 02/24/19  1724 02/25/19  0025 02/25/19  0620   POCGLU 117 121 194* 268*       Blood Sugar Average: Last 72 hrs:  (P) 144 [id number]     Hold metformin  Continue SSI and Lantus    Weakness generalized  Assessment & Plan  Some improvement  Need placement     Parkinson's disease with use of electrical brain stimulation (HCC)  Assessment & Plan  · Continue Sinemet         VTE Pharmacologic Prophylaxis:   Pharmacologic: Heparin  Mechanical VTE Prophylaxis in Place: Yes    Patient Centered Rounds: I have performed bedside rounds with nursing staff today  Discussions with Specialists or Other Care Team Provider:     Education and Discussions with Family / Patient: patient     Time Spent for Care: 20 minutes  More than 50% of total time spent on counseling and coordination of care as described above      Current Length of Stay: 9 day(s)    Current Patient Status: Inpatient   Certification Statement: The patient will continue to require additional inpatient hospital stay due to acute above condition on IV antibioitic     Discharge Plan: pending placement     Code Status: Level 3 - DNAR and DNI      Subjective:   My lips are dry  No further complains, chronic looks appearance     Objective:     Vitals:   Temp (24hrs), Av 5 °F (36 9 °C), Min:97 9 °F (36 6 °C), Max:99 14 °F (37 3 °C)    Temp:  [97 9 °F (36 6 °C)-99 14 °F (37 3 °C)] 97 9 °F (36 6 °C)  HR:  [61-63] 63  Resp:  [16-18] 16  BP: (129-139)/(67-71) 129/67  SpO2:  [94 %-95 %] 95 %  Body mass index is 26 81 kg/m²  Input and Output Summary (last 24 hours): Intake/Output Summary (Last 24 hours) at 2019 1022  Last data filed at 2019 0900  Gross per 24 hour   Intake 0 ml   Output 950 ml   Net -950 ml       Physical Exam:     Physical Exam   Constitutional: He does not have a sickly appearance  No distress  Chronically ill appearance   Cardiovascular: Regular rhythm, normal heart sounds and intact distal pulses  Exam reveals no gallop and no friction rub  No murmur heard  Pulmonary/Chest: Effort normal  No stridor  No respiratory distress  He has no wheezes  He has no rales  Abdominal: Soft  He exhibits no distension and no mass  There is no tenderness  There is no guarding  Peg tube in place  No discharge or bleeding   Genitourinary:   Genitourinary Comments: Ellis catheter in place   Musculoskeletal: He exhibits no edema or deformity  Decreased range of motion   Neurological: He is alert  A cranial nerve deficit is present  Coordination abnormal    Oriented x1   Skin: Skin is warm  Capillary refill takes 2 to 3 seconds  He is not diaphoretic  There is pallor  Psychiatric: He has a normal mood and affect           Additional Data:     Labs:    Results from last 7 days   Lab Units 19  0505 19  0649   WBC Thousand/uL 4 64 3 38* HEMOGLOBIN g/dL 10 5* 10 2*   HEMATOCRIT % 33 4* 31 3*   PLATELETS Thousands/uL 106* 83*   NEUTROS PCT %  --  51   LYMPHS PCT %  --  26   MONOS PCT %  --  20*   EOS PCT %  --  1     Results from last 7 days   Lab Units 02/23/19  1158   SODIUM mmol/L 146*   POTASSIUM mmol/L 3 6   CHLORIDE mmol/L 108   CO2 mmol/L 27   BUN mg/dL 20   CREATININE mg/dL 0 98   ANION GAP mmol/L 11   CALCIUM mg/dL 8 6   GLUCOSE RANDOM mg/dL 136         Results from last 7 days   Lab Units 02/25/19  0620 02/25/19  0025 02/24/19  1724 02/24/19  1115 02/24/19  0652 02/24/19  0002 02/23/19  1810 02/23/19  1139 02/23/19  0545 02/22/19  2050 02/22/19  1727 02/22/19  1104   POC GLUCOSE mg/dl 268* 194* 121 117 119 125 108 116 123 122 158* 141*                   * I Have Reviewed All Lab Data Listed Above  * Additional Pertinent Lab Tests Reviewed:  All Labs Within Last 24 Hours Reviewed    Imaging:    Imaging Reports Reviewed Today Include:   Imaging Personally Reviewed by Myself Includes:      Recent Cultures (last 7 days):           Last 24 Hours Medication List:     Current Facility-Administered Medications:  acetaminophen 650 mg Rectal Q6H PRN Rene Pozo MD    ampicillin 2,000 mg Intravenous Q6H Geoffrey Martin MD Last Rate: 2,000 mg (02/25/19 0316)   aspirin 81 mg Oral QPM Rene Pozo MD    atorvastatin 80 mg Oral QPM Rene Pozo MD    carbidopa-levodopa 1 tablet Oral HS Rene Pozo MD    carbidopa-levodopa 1 tablet Per PEG Tube TID Herb Denise,     chlorhexidine 15 mL Swish & Spit Q12H [address] Rene Pozo MD    vitamin B-12 100 mcg Oral Daily Rene Pozo MD    heparin (porcine) 5,000 Units Subcutaneous Q8H [address] Rene Pozo MD    insulin lispro 1-6 Units Subcutaneous Q6H [address] Rene Pozo MD    ondansetron 4 mg Intravenous Q6H PRN Rene Pozo MD    rOPINIRole 0 75 mg Per PEG Tube TID [employer], DO         Today, Patient Was Seen By: oRsemarie Winchester MD    ** Please Note: Dictation voice to text software may have been used in the creation of this document   **

## 2019-02-25 NOTE — OCCUPATIONAL THERAPY NOTE
Occupational Therapy Treatment Note      Cassandra Garza     2/25/2019    Patient Active Problem List   Diagnosis    Parkinson's disease with use of electrical brain stimulation ([facility] )    S/P deep brain stimulator placement    Drooling    Dysphagia    Pancreatic cyst    Weakness generalized    Acute metabolic encephalopathy    Type 2 diabetes mellitus [facility] )    HTN (hypertension)    History of CVA (cerebrovascular accident)    Hypoglycemia    Concussion    Ambulatory dysfunction    Neurological movement disorder    Hydronephrosis with urinary obstruction due to renal calculus    Sepsis ([facility] )    Bacteremia       Past Medical History:   Diagnosis Date    Arthritis     BPH (benign prostatic hyperplasia)     Diabetes ([facility] )     Heart problem     Movement disorder     Myocardial infarction ([facility] )     Parkinson's disease ([facility] )     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     Stroke [facility])        Past Surgical History:   Procedure Laterality Date    APPENDECTOMY      CARDIAC DEFIBRILLATOR PLACEMENT      CARDIAC DEFIBRILLATOR PLACEMENT      CHOLECYSTECTOMY      CORONARY ANGIOPLASTY WITH STENT PLACEMENT      DEEP BRAIN STIMULATOR PLACEMENT      EAR SURGERY      GALLBLADDER SURGERY      IR PEG/GJ TUBE PLACEMENT  2/22/2019    IR TUBE PLACEMENT NEPHROSTOMY  2/17/2019    KNEE SURGERY      SKIN CANCER EXCISION         Pt participated in skilled OT session today addressing the following interventions Patient / Family Education, Therapeutic Activity, Activity tolerance training, and  Sitting/ standing balance task to increase EOB/ OOB amber performance tolerance  Patient agreeable to OT treatment session, upon arrival patient was found alert, responsive  and in no apparent distress  Patient demonstrated ability to maintain seating balance with min A from OT  Pt required vcs to maintain attention to balance and for encouragement    Pt was able to sit with min A and intermitten use of BUEs  Pt was able to remain in seated position for approximately 12 minutes with poor+ sitting balance declining to poor with fatigue  Patient continues to be functioning below baseline level, occupational performance remains limited secondary to factors listed above and increased risk for falls and injury  Co treatment session with Physical Therapy secondary to complex medical condition, requiring two skilled therapists to provide therapeutic techniques to position, facilitate and elicit targeted outcome  From OT standpoint, recommendation at time of d/c would be STR  Patient to benefit from continued Occupational Therapy treatment while in the hospital to address deficits as defined above and maximize level of functional independence with ADLs and functional mobility      Caro Patel, MS OTR/L

## 2019-02-25 NOTE — WOUND OSTOMY CARE
Progress Note - Wound   Pablo Dice  [de-identified] y o  male MRN: [id number]  Unit/Bed#: -01 Encounter: [id number]      Assessment:  Wound care to see patient for weekly follow up visit  Patient seen in bed, cooperative with assessment  accu-max pump to the bed  Dependent for care  Assist with turning, wedges in use  Ellis for urine  Incontinent of bowel      B/l heels remain intact with no redness or wounds noted  Recommend offloading and continued use of foam dressings for prevention      R buttock with stage 2 pressure injury  POA  Wound bed is 100% pink  Edges flat attached, scabbing is now gone  Brenda-wound is intact blanchable pink skin  Remainder of skin to buttocks/ sacrum is intact with blanchable pink skin  Recommend to continue with foam dressing      No induration, fluctuance, redness, or purulence noted to the above noted wounds  Reapplied foam dressing as it was not soiled  Patient tolerated well  Primary nurse aware of plan of care  See flow sheets for more detailed assessment findings  Will follow along      Plan: 1  Cleanse R buttock pressure injury with NSS, apply small sacral foam dressing  Change every 3 day and PRN for soilage/dislodgment  If patient becomes too incontinent, changing dressing more than 1 time per shift, please d/c and use calazime cream TID and PRN  2  Apply skin nourishing cream the entire skin daily for moisture  3  Turn and reposition patient every  2 hours   4  Elevate heels off of bed with pillows to offload pressure   5  Apply EHOB waffle cushion to chair when OOB, if able  6  Apply alleyvn foam dressings to b/l heels for prevention  Peel back and assess the skin every shift and PRN  Change every 3 days and PRN for soilage/dislodgement  7  Wound care will follow along with patient weekly, please call with questions and concerns    Objective:    Vitals: Blood pressure 139/71, pulse 61, temperature 99 14 °F (37 3 °C), resp   rate 18, height 5' 11" (1 803 m), weight 87 2 kg (192 lb 3 9 oz), SpO2 94 %  ,Body mass index is 26 81 kg/m²  Wound 02/16/19 Pressure Injury Buttocks Right (Active)   Wound Description Fragile;Pink 2/25/2019  9:00 AM   Staging Stage II 2/25/2019  9:00 AM   Brenda-wound Assessment Dry; Intact; Pink 2/25/2019  9:00 AM   Wound Length (cm) 0 5 cm 2/25/2019  9:00 AM   Wound Width (cm) 0 5 cm 2/25/2019  9:00 AM   Wound Depth (cm) 0 1 2/25/2019  9:00 AM   Calculated Wound Area (cm^2) 0 25 cm^2 2/25/2019  9:00 AM   Calculated Wound Volume (cm^3) 0 02 cm^3 2/25/2019  9:00 AM   Tunneling 0 cm 2/25/2019  9:00 AM   Tunneling in depth located at 0 2/25/2019  9:00 AM   Undermining 0 2/25/2019  9:00 AM   Undermining is depth extending from 0 2/25/2019  9:00 AM   Closure None 2/25/2019  9:00 AM   Drainage Amount None 2/25/2019  9:00 AM   Non-staged Wound Description Partial thickness 2/25/2019  9:00 AM   Treatments Cleansed;Irrigation with NSS;Site care;Elevated 2/25/2019  9:00 AM   Dressing Other (Comment) 2/25/2019  9:00 AM   Wound packed? No 2/25/2019  9:00 AM   Packing- # removed 0 2/25/2019  9:00 AM   Packing- # inserted 0 2/25/2019  9:00 AM   Dressing Changed New 2/25/2019  9:00 AM   Patient Tolerance Tolerated well 2/25/2019  9:00 AM   Dressing Status Clean;Dry; Intact 2/25/2019  9:00 AM     Recommendations written as orders  AVS updated    Janeane Phoenix RN BSN CWON

## 2019-02-25 NOTE — PLAN OF CARE
Problem: OCCUPATIONAL THERAPY ADULT  Goal: Performs self-care activities at highest level of function for planned discharge setting  See evaluation for individualized goals  Description  Treatment Interventions: ADL retraining, Activityengagement, Energy conservation, Cardiac education, Continued evaluation, Compensatory technique education, Equipment evaluation/education, Patient/family training, UE strengthening/ROM, Functional transfer training, Endurance training          See flowsheet documentation for full assessment, interventions and recommendations  Note:   Limitation: Decreased ADL status, Decreased UE strength, Decreased Safe judgement during ADL, Decreased cognition, Decreased endurance, Decreased UE ROM, Decreased self-care trans, Decreased high-level ADLs  Prognosis: Good  Assessment: Pt agreeable to skilled OT services with focus on improving functional mobility and self care skills, however pt presents as lethargic  RN, Radha Hess stated that pt scheduled for PEG tube placement this afternoon  Pt requires continuous stimuli to remain engaged in activity  Pt supine in bed upon start of session with aideBeverly stating that pt had just been bathed requiring TA +2 for ADL with no attempt made by pt to,assist with task  Pt able to follow one-step direction with encouragement to engage  Log roll technique side <>side with Max A and placement of hand onto bedrail   Pt demonstrated  poor activity  tolerance during presented tasks requiring continuous stimuli   Pt unable to perform OOB mobility at this time due to fatigue  Sai Wilkinson Pt performance demonstrated poor carryover of learned techniques and strategies to facilitate safety during self care and daily routine  Pt continues to demonstrate deficits in the areas of self care and functional mobility and  would continue to benefit from skilled OT POC to facilitate return to PLOF and reduced BOC for caregivers        OT Discharge Recommendation: Short Term Rehab  OT - OK to Discharge: Yes(when medically cleared)

## 2019-02-25 NOTE — ASSESSMENT & PLAN NOTE
Continue secondary prevention
Lab Results   Component Value Date    HGBA1C 6 9 (H) 09/19/2015       Recent Labs     02/16/19  2112 02/17/19  0649 02/17/19  1058   POCGLU 134 119 125       Blood Sugar Average: Last 72 hrs:  (P) 126     Hold metformin  Continue SSI and Lantus
Lab Results   Component Value Date    HGBA1C 6 9 (H) 09/19/2015       Recent Labs     02/22/19  1727 02/22/19 2050 02/23/19  0545 02/23/19  1139   POCGLU 158* 122 123 116       Blood Sugar Average: Last 72 hrs:  (P) 836 [id number]     Hold metformin  Continue SSI and Lantus
Lab Results   Component Value Date    HGBA1C 6 9 (H) 09/19/2015       Recent Labs     02/23/19  1139 02/23/19  1810 02/24/19  0002 02/24/19  0652   POCGLU 116 108 125 119       Blood Sugar Average: Last 72 hrs:  (P) 140 6     Hold metformin  Continue SSI and Lantus
Lab Results   Component Value Date    HGBA1C 6 9 (H) 09/19/2015       Recent Labs     02/24/19  1115 02/24/19  1724 02/25/19  0025 02/25/19  0620   POCGLU 117 121 194* 268*       Blood Sugar Average: Last 72 hrs:  (P) 144 [id number]   Re-start home medication
Lab Results   Component Value Date    HGBA1C 6 9 (H) 09/19/2015       Recent Labs     02/24/19  1115 02/24/19  1724 02/25/19  0025 02/25/19  0620   POCGLU 117 121 194* 268*       Blood Sugar Average: Last 72 hrs:  (P) 144 [id number]     Hold metformin  Continue SSI and Lantus
Some improvement  Need placement
Some improvement setting of old stroke  Will be discharge to rehab
Stable  Continue home medication
· Blood cultures positive x2 gram-positive cocci in pairs  · Check a procalcitonin now and in a m  · Further will obtain repeat blood cultures in a m 
· Continue Ampicillin as per ID Recommendation
· Continue Sinemet
· Improved  · Present on admission evident by fever leukocytosis CT imaging findings of a hydronephrosis with renal calculi at the UPJ junction  · Continue IV antibiotic  · Status post PEG Tube
· Improved  · Present on admission evident by fever leukocytosis CT imaging findings of a hydronephrosis with renal calculi at the UPJ junction  · Continue IV antibiotic  · Status post PEG Tube  · Continue current nutritional
· Improved  · Present on admission evident by fever leukocytosis CT imaging findings of a hydronephrosis with renal calculi at the UPJ junction  · Continue IV antibiotic till 3/02/19  · Status post PEG Tube  · Continue current nutritional
· Improved  · Present on admission evident by fever leukocytosis CT imaging findings of a hydronephrosis with renal calculi at the UPJ junction  · Continue IV antibiotic till 3/02/19  · Status post PEG Tube  · Continue current nutritional
· On Sinemet continue
· PT OT evaluation
· PT OT evaluation recommended shortness rehab
· Patient has been on the hypotensive side this is likely in setting of sepsis  · Continue to monitor  · Continue IVF
· Patient has been on the hypotensive side this is likely in setting of sepsis  · Continue to monitor  · Increase IV fluids to 125
· Plan as mentioned above
· Present on admission evident by fever leukocytosis CT imaging findings of a hydronephrosis with renal calculi at the UPJ junction  · Consult Urology  · Place Ellis for now  · I/O  · Continue IV cefepime  · Blood cultures x2 positive for gram-positive cocci in pairs  · Patient further reporting difficulty eating will put to a pureed thin liquid have SLP evaluate
· Status post nephrostomy tube
· Status post nephrostomy tube
· Status post nephrostomy tube  ·
Never smoker

## 2019-02-25 NOTE — PHYSICAL THERAPY NOTE
Physical Therapy Treatment Note       02/25/19 1230   Pain Assessment   Pain Assessment 0-10   Pain Score   (pt did not provide numeric score)   Pain Location Leg   Pain Orientation Right;Posterior  (calf)   Hospital Pain Intervention(s)   (RN notified of pt's report)   Pain Rating: FLACC (Rest) - Face 0   Pain Rating: FLACC (Rest) - Legs 0   Pain Rating: FLACC (Rest) - Activity 0   Pain Rating: FLACC (Rest) - Cry 0   Pain Rating: FLACC (Rest) - Consolability 0   Score: FLACC (Rest) 0   Restrictions/Precautions   Weight Bearing Precautions Per Order No   Braces or Orthoses   (none per pt/family)   Other Precautions Bed Alarm;Multiple lines; Fall Risk;Pain;Limb alert   General   Chart Reviewed Yes   Response to Previous Treatment Patient unable to report, no changes reported from family or staff   Family/Caregiver Present Yes   Cognition   Arousal/Participation Persistent stimuli required; Alert   Attention Attends with cues to redirect   Orientation Level Oriented to place;Oriented to person;Oriented to time   Following Commands Follows one step commands with increased time or repetition   Comments pt agreeable to PT session, motivated to participate   Subjective   Subjective "I want to get up"   Bed Mobility   Rolling R 2  Maximal assistance   Additional items Assist x 2;Bedrails; Increased time required;Verbal cues;LE management;HOB elevated   Rolling L 2  Maximal assistance   Additional items Assist x 2;Bedrails; Increased time required;Verbal cues;LE management;HOB elevated   Supine to Sit 2  Maximal assistance   Additional items Assist x 2;Bedrails;HOB elevated; Increased time required;Verbal cues;LE management   Sit to Supine 2  Maximal assistance   Additional items Assist x 2;Bedrails;HOB elevated; Increased time required;Verbal cues;LE management   Additional Comments vitals returned BTB: 112/58mmHg, 97% SpO2 on RA   Transfers   Sit to Stand 2  Maximal assistance   Additional items Assist x 2; Increased time required;Verbal cues   Stand to Sit 2  Maximal assistance   Additional items Assist x 2; Increased time required;Verbal cues   Additional Comments Pt able to trial 2 sit<>stand attempts with complete bottom clearance, pt noted to have significant R sided trunk lean, RN notified of such  Pt denies any pain in [address] position  Pt able to tolerate static standing on 2 attempts x5 & 8 sec duration  RW BUE support utilized c max vc and tactile cues for sequencing, hand/foot placement, upright and symmetrical posture  Ambulation/Elevation   Gait pattern Not tested; Not appropriate  (2/2 poor standing posture/tolerance)   Balance   Static Sitting Poor +  (Poor+ initially to Poor with fatigue)   Dynamic Sitting Poor   Static Standing Poor -   Dynamic Standing   (DNT)   Endurance Deficit   Endurance Deficit Yes   Activity Tolerance   Activity Tolerance Patient limited by fatigue   Medical Staff Made Aware OT [address] verbalized pt appropriate to see, made aware of session outcome/recs   Assessment   Prognosis Guarded   Problem List Decreased strength;Decreased endurance; Impaired balance;Decreased mobility; Decreased safety awareness   Assessment Co treatment with OT secondary to complex medical condition of pt, max A of 2-3 required to achieve and maintain transitional movements, requiring the need of skilled therapeutic intervention of 2 therapists to achieve delivery of services  Pt seen for PT treatment session this date with interventions consisting of therapeutic activity consisting of training: bed mobility, supine<>sit transfers, sit<>stand transfers and vc and tactile cues for static standing posture faciliation  Pt agreeable to PT treatment session upon arrival, pt found supine in bed w/ HOB elevated, in no apparent distress, responsive and motivated to participate  Pt continues to have fatigue limiting pt's tolerance, deferred ther ex at this time 2/2 such   In comparison to previous session, pt with improvements in completion of full supine<>sit transfer seated at EOB, external assistance and environmental supports required for maintaining symmetrical sitting posture at EOB progressing from min-mod A with fatigue  Pt demonstrating R sided lean in sitting and standing positions  Post session: pt returned BTB, bed alarm engaged, all needs in reach and RN notified of session findings/recommendations  Continue to recommend STR at time of d/c in order to maximize pt's functional independence and safety w/ mobility  Pt continues to be functioning below baseline level, and remains limited 2* factors listed above  PT will continue to see pt while here in order to address the deficits listed above and provide interventions consistent w/ POC in effort to achieve STGs  Barriers to Discharge Inaccessible home environment;Decreased caregiver support   Goals   Patient Goals to go to rehab   STG Expiration Date 03/01/19   Short Term Goal #1 STGs remain appropriate   Treatment Day 2   Plan   Treatment/Interventions Functional transfer training;LE strengthening/ROM; Therapeutic exercise; Endurance training;Patient/family training;Equipment eval/education; Bed mobility;Gait training;Spoke to nursing;Spoke to case management;OT;Family   Progress Slow progress, decreased activity tolerance   PT Frequency   (3-5x/wk)   Recommendation   Recommendation Short-term skilled PT   Equipment Recommended   (TBD at STR)   PT - OK to Discharge Yes  (when medically cleared if to STR)       Ariella Owens, PT, DPT    Time of PT treatment session: [phone]

## 2019-02-25 NOTE — DISCHARGE SUMMARY
Discharge- Pablo Dice  1940, [de-identified] y o  male MRN: [id number]    Unit/Bed#: -01 Encounter: [id number]    Primary Care Provider: Mario Jeter MD   Date and time admitted to hospital: 2/16/2019 12:44 PM        * Sepsis [facility])  Assessment & Plan  · Improved  · Present on admission evident by fever leukocytosis CT imaging findings of a hydronephrosis with renal calculi at the UPJ junction  · Continue IV antibiotic till 3/02/19  · Status post PEG Tube  · Continue current nutritional     Bacteremia  Assessment & Plan  · Continue Ampicillin as per ID Recommendation    Ambulatory dysfunction  Assessment & Plan  · PT OT evaluation recommended shortness rehab     History of CVA (cerebrovascular accident)  Assessment & Plan  Continue secondary prevention    HTN (hypertension)  Assessment & Plan  Stable  Continue home medication    Type 2 diabetes mellitus [facility])  Assessment & Plan  Lab Results   Component Value Date    HGBA1C 6 9 (H) 09/19/2015       Recent Labs     02/24/19  1115 02/24/19  1724 02/25/19  0025 02/25/19  0620   POCGLU 117 121 194* 268*       Blood Sugar Average: Last 72 hrs:  (P) 144 [id number]   Re-start home medication    Weakness generalized  Assessment & Plan  Some improvement setting of old stroke  Will be discharge to rehab    Parkinson's disease with use of electrical brain stimulation [facility])  Assessment & Plan  · Continue Sinemet           Discharging Physician / Practitioner: Diane Ramos MD  PCP: Mario Jeter MD  Admission Date:   Admission Orders (From admission, onward)    Ordered        02/16/19 1609  Inpatient Admission (expected length of stay for this patient Order details is greater than two midnights)  Once     Order ID Start Status   [id number] 02/16/19 1609 Completed              Discharge Date: 02/25/19    Resolved Problems  Date Reviewed: 2/25/2019    None          Consultations During Hospital Stay:  · Urology  · IR  · ID    Procedures Performed: · Nephrostomy     Significant Findings / Test Results:   ·     Incidental Findings:   ·     Test Results Pending at Discharge (will require follow up):   ·      Outpatient Tests Requested:  ·     Complications:      Reason for Admission:     Hospital Course:     Janie Madrigal  is a [de-identified] y o  male patient who originally presented to the hospital on 2/16/2019 due to generalized weakness, setting of sepsis, secondary to left ureteral nephrolithiasis, Status post nephrostomy by interventional Radiology  Positive bacteremia, closely follow up by Infectious Disease which at this point will need to complete for the IV antibiotic  Chronic significant past medical history of Parkinson disease Status post DBS, DMII, HTN, CVA with neurological deficit, BPH, Hyperlipidemia  Patient with electrolyte imbalance and acute kidney injury in the setting of CKD  Patient with significant dysphagia evaluated closely by speech  As patient and did not pass diet trial patient is status post peg tube  PT OT evaluated patient recommend patient should go to short-term rehab  Please see above list of diagnoses and related plan for additional information  Condition at Discharge: stable     Discharge Day Visit / Exam:     * Please refer to separate progress note for these details *    Discussion with Family:     Discharge instructions/Information to patient and family:   See after visit summary for information provided to patient and family  Provisions for Follow-Up Care:  See after visit summary for information related to follow-up care and any pertinent home health orders  Disposition:     Acute Rehab at SNF    For Discharges to [facility] SNF:   · SNF - Not Applicable to this Patient    Planned Readmission: ***     Discharge Statement:  I spent *** minutes discharging the patient  This time was spent on the day of discharge  I had direct contact with the patient on the day of discharge   Greater than 50% of the total time was spent examining patient, answering all patient questions, arranging and discussing plan of care with patient as well as directly providing post-discharge instructions  Additional time then spent on discharge activities  Discharge Medications:  See after visit summary for reconciled discharge medications provided to patient and family        ** Please Note: This note has been constructed using a voice recognition system **

## 2019-02-25 NOTE — PLAN OF CARE
Problem: DISCHARGE PLANNING - CARE MANAGEMENT  Goal: Discharge to post-acute care or home with appropriate resources  Description  INTERVENTIONS:  - Conduct assessment to determine patient/family and health care team treatment goals, and need for post-acute services based on payer coverage, community resources, and patient preferences, and barriers to discharge  - Address psychosocial, clinical, and financial barriers to discharge as identified in assessment in conjunction with the patient/family and health care team  - Arrange appropriate level of post-acute services according to patient?s   needs and preference and payer coverage in collaboration with the physician and health care team  - Communicate with and update the patient/family, physician, and health care team regarding progress on the discharge plan  - Arrange appropriate transportation to post-acute venues  Outcome: Progressing  Note:   CM received notification that per SLIM pt is medically cleared to d/c to STR today  CM requested PT/OT prioritize pt for this morning for therapy note updates  CM placed call to pt's daughter and caregiver Radha Aguayo to discuss DCP - per Radha Aguayo family preference is for [facility]  Discussed that [facility] and [facility] are only local facilities that accept [facility]  Daughter also requests referral to [facility]  Daughter reports if local facilities do not offer bed she is open to [facility] in [place]  Updated clinicals sent via [facility] to above facilities  CM awaiting PT notes and will follow up with daughter after - daughter reports she will be at bedside in 1 hour  CM department will continue to follow

## 2019-02-26 ENCOUNTER — DOCUMENTATION (OUTPATIENT)
Dept: MEDSURG UNIT | Facility: HOSPITAL | Age: 79
End: 2019-02-26

## 2019-02-26 VITALS
HEIGHT: 71 IN | DIASTOLIC BLOOD PRESSURE: 67 MMHG | HEART RATE: 77 BPM | SYSTOLIC BLOOD PRESSURE: 133 MMHG | BODY MASS INDEX: 26.85 KG/M2 | WEIGHT: 191.8 LBS | RESPIRATION RATE: 18 BRPM | OXYGEN SATURATION: 99 % | TEMPERATURE: 99.5 F

## 2019-02-26 LAB
GLUCOSE SERPL-MCNC: 280 MG/DL (ref 65–140)
GLUCOSE SERPL-MCNC: 348 MG/DL (ref 65–140)
GLUCOSE SERPL-MCNC: 361 MG/DL (ref 65–140)
GLUCOSE SERPL-MCNC: 388 MG/DL (ref 65–140)

## 2019-02-26 PROCEDURE — 99238 HOSP IP/OBS DSCHRG MGMT 30/<: CPT | Performed by: INTERNAL MEDICINE

## 2019-02-26 PROCEDURE — 82948 REAGENT STRIP/BLOOD GLUCOSE: CPT

## 2019-02-26 PROCEDURE — 99232 SBSQ HOSP IP/OBS MODERATE 35: CPT | Performed by: INTERNAL MEDICINE

## 2019-02-26 RX ORDER — CHLORHEXIDINE GLUCONATE 0.12 MG/ML
15 RINSE ORAL EVERY 12 HOURS SCHEDULED
Qty: 120 ML | Refills: 0
Start: 2019-02-26

## 2019-02-26 RX ADMIN — CHLORHEXIDINE GLUCONATE 0.12% ORAL RINSE 15 ML: 1.2 LIQUID ORAL at 11:11

## 2019-02-26 RX ADMIN — INSULIN LISPRO 6 UNITS: 100 INJECTION, SOLUTION INTRAVENOUS; SUBCUTANEOUS at 18:11

## 2019-02-26 RX ADMIN — AMPICILLIN SODIUM 2000 MG: 2 INJECTION, POWDER, FOR SOLUTION INTRAMUSCULAR; INTRAVENOUS at 15:00

## 2019-02-26 RX ADMIN — CARBIDOPA AND LEVODOPA 1 TABLET: 25; 100 TABLET ORAL at 11:12

## 2019-02-26 RX ADMIN — AMPICILLIN SODIUM 2000 MG: 2 INJECTION, POWDER, FOR SOLUTION INTRAMUSCULAR; INTRAVENOUS at 08:55

## 2019-02-26 RX ADMIN — ROPINIROLE 0.75 MG: 0.25 TABLET, FILM COATED ORAL at 11:11

## 2019-02-26 RX ADMIN — CARBIDOPA AND LEVODOPA 1 TABLET: 25; 100 TABLET ORAL at 15:18

## 2019-02-26 RX ADMIN — HEPARIN SODIUM 5000 UNITS: 5000 INJECTION, SOLUTION INTRAVENOUS; SUBCUTANEOUS at 06:21

## 2019-02-26 RX ADMIN — INSULIN LISPRO 5 UNITS: 100 INJECTION, SOLUTION INTRAVENOUS; SUBCUTANEOUS at 12:17

## 2019-02-26 RX ADMIN — HEPARIN SODIUM 5000 UNITS: 5000 INJECTION, SOLUTION INTRAVENOUS; SUBCUTANEOUS at 15:00

## 2019-02-26 RX ADMIN — VITAM B12 100 MCG: 100 TAB at 11:11

## 2019-02-26 RX ADMIN — INSULIN LISPRO 4 UNITS: 100 INJECTION, SOLUTION INTRAVENOUS; SUBCUTANEOUS at 00:27

## 2019-02-26 RX ADMIN — AMPICILLIN SODIUM 2000 MG: 2 INJECTION, POWDER, FOR SOLUTION INTRAMUSCULAR; INTRAVENOUS at 02:46

## 2019-02-26 RX ADMIN — INSULIN LISPRO 6 UNITS: 100 INJECTION, SOLUTION INTRAVENOUS; SUBCUTANEOUS at 06:51

## 2019-02-26 RX ADMIN — ROPINIROLE 0.75 MG: 0.25 TABLET, FILM COATED ORAL at 15:18

## 2019-02-26 NOTE — PLAN OF CARE
If patient continues to have elevated BG recommend changing TF to Glucerna 1 2 @ 75mL/hr continuous via PEG with 150mL flush q4hrs to provide 2160kcal, 108g protein, 2349mL total fluid, 206g CHO and 108g fat  Changing the TF formula with reduce CHO intake by 98g per day  TF Glucerna 1 2 will provide additional protein which will not warrant Amrit supplement  Problem: Nutrition/Hydration-ADULT  Goal: Nutrient/Hydration intake appropriate for improving, restoring or maintaining nutritional needs  Description  Monitor and assess patient's nutrition/hydration status for malnutrition (ex- brittle hair, bruises, dry skin, pale skin and conjunctiva, muscle wasting, smooth red tongue, and disorientation)  Collaborate with interdisciplinary team and initiate plan and interventions as ordered  Monitor patient's weight and dietary intake as ordered or per policy  Utilize nutrition screening tool and intervene per policy  Determine patient's food preferences and provide high-protein, high-caloric foods as appropriate       INTERVENTIONS:  - Monitor oral intake, urinary output, labs, and treatment plans  - Assess nutrition and hydration status and recommend course of action  - Evaluate amount of meals eaten  - Assist patient with eating if necessary   - Allow adequate time for meals  - Recommend/ encourage appropriate diets, oral nutritional supplements, and vitamin/mineral supplements  - Order, calculate, and assess calorie counts as needed  - Recommend, monitor, and adjust tube feedings based on assessed needs  - Assess need for intravenous fluids  - Provide low sodium nutrition/hydration education as appropriate  - Include patient/family/caregiver in decisions related to nutrition   Outcome: Progressing

## 2019-02-26 NOTE — UTILIZATION REVIEW
Continued Stay Review    Date: 2/26  Vital Signs: /67   Pulse 77   Temp 99 5 °F (37 5 °C)   Resp 18   Ht 5' 11" (1 803 m)   Wt 87 kg (191 lb 12 8 oz)   SpO2 99%   BMI 26 75 kg/m²   Assessment/Plan: [de-identified] yo male admitted w/ septic shock willcont abx and follow temps   Bacteremia repeat bld cx neg , TTE neg for vegetation , cont ampicillin for 2 weeks through 3/2,check cbc and creat while on IV abx    PICC may be placed   Medications:   Scheduled Meds:   Current Facility-Administered Medications:  acetaminophen 650 mg Rectal Q6H PRN     ampicillin 2,000 mg Intravenous Q6H  Last Rate: 2,000 mg (02/26/19 0855)   aspirin 81 mg Oral QPM     atorvastatin 80 mg Oral QPM     carbidopa-levodopa 1 tablet Oral HS     carbidopa-levodopa 1 tablet Per PEG Tube TID     chlorhexidine 15 mL Swish & Spit Q12H [facility] HOME     vitamin B-12 100 mcg Oral Daily     heparin (porcine) 5,000 Units Subcutaneous Q8H [facility]     insulin lispro 1-6 Units Subcutaneous Q6H [facility]     ondansetron 4 mg Intravenous Q6H PRN     rOPINIRole 0 75 mg Per PEG Tube TID       Pertinent Labs/Diagnostic Results: gluc  [id number]  Age/Sex: [de-identified] y o  male   Discharge Plan: TBD

## 2019-02-26 NOTE — SOCIAL WORK
Warm hand off was given to this CM by colleague that Jas Anand obtained [facility]  CM spoke to dtr-in law Miki Fairbanks who informed she changed her mind and would like patient to go to [address] instead because it's closer to her  CM  Explained to Miki Fairbanks that Jas Anand obtained Kristina [facility] and [address] is not Par with [facility]  Miki Fairbanks states she phoned [facility] and they informed they will pay for patient's rehab at [facility] [place]  Miki Fairbanks called [facility] representative Nato Perales on a three way who confirmed [facility] will pay for patient's stay at [address]  Nato Perales informed she will call bismark in the admissions office at [address] to notify  Nato Perales called this CM back to inform she spoke to Luis Evans at [facility] and everything is confirmed for patient to go to [facility]  Nato Perales notified this CM bismark will call this CM to confirm patient has been accepted to [facility]  CM did receive a call from Nato Perales stating they will accept patient  Nurse and SLIM notified

## 2019-02-26 NOTE — SOCIAL WORK
Per SLIM patient is cleared for discharge  Patient will be transported to [facility] today via [place] [facility]  Patient's dtr in law Patricio Smyth was informed of IMM   IMM is in the chart  CM obtained auth# [id number] from [facility] for [facility] transportation to [facility]  Medical necessity is in the chart  Patient, Agustin Botswanan, nurse and LUC are notified of above mentioned

## 2019-02-26 NOTE — DISCHARGE INSTR - AVS FIRST PAGE
? Continue ampicillin for 2 weeks IV antibiotics through 3/2/19  ? Check weekly CBC with diff and creatinine while on IV antibiotics      Diet Enteral/Parenteral; Tube Feeding No Oral Diet; Jevity 1 2 Porfirio; Continuous; 75; Amrit - One Packet;  Water; Every 4 hours

## 2019-02-26 NOTE — PLAN OF CARE
Problem: DISCHARGE PLANNING - CARE MANAGEMENT  Goal: Discharge to post-acute care or home with appropriate resources  Description  INTERVENTIONS:  - Conduct assessment to determine patient/family and health care team treatment goals, and need for post-acute services based on payer coverage, community resources, and patient preferences, and barriers to discharge  - Address psychosocial, clinical, and financial barriers to discharge as identified in assessment in conjunction with the patient/family and health care team  - Arrange appropriate level of post-acute services according to patient?s   needs and preference and payer coverage in collaboration with the physician and health care team  - Communicate with and update the patient/family, physician, and health care team regarding progress on the discharge plan  - Arrange appropriate transportation to post-acute venues  Outcome: Progressing  Warm hand off was given to this CM by colleague that Gorge Claros obtained [place]  CM spoke to dtr-in law Shayla Grove who informed she changed her mind and would like patient to go to [address] instead because it's closer to her  CM  Explained to Shayla Grove that [place] and [address] is not Par with [employer]  Shayla Grove states she phoned [employer] and they informed they will pay for patient's rehab at [facility]  Shayla Grove called Mehnaz Maravillaicent Ebbing on a three way who confirmed [employer] will pay for patient's stay at [address]  Margaux Ebbing informed she will call bismark in the admissions office at [address] to notify  Margaux Ebbing called this CM back to inform she spoke to Caro Brar at [facility] and everything is confirmed for patient to go to [facility]  Margaux Ebbing notified this CM bismark will call this CM to confirm patient has been accepted to [facility]  CM did receive a call from Margaux Ebbing stating they will accept patient  Nurse and SLIM notified

## 2019-02-26 NOTE — PLAN OF CARE
Problem: DISCHARGE PLANNING - CARE MANAGEMENT  Goal: Discharge to post-acute care or home with appropriate resources  Description  INTERVENTIONS:  - Conduct assessment to determine patient/family and health care team treatment goals, and need for post-acute services based on payer coverage, community resources, and patient preferences, and barriers to discharge  - Address psychosocial, clinical, and financial barriers to discharge as identified in assessment in conjunction with the patient/family and health care team  - Arrange appropriate level of post-acute services according to patient?s   needs and preference and payer coverage in collaboration with the physician and health care team  - Communicate with and update the patient/family, physician, and health care team regarding progress on the discharge plan  - Arrange appropriate transportation to post-acute venues  2/26/2019 1600 by Otf Maloney  Outcome: Progressing   Per SLIM patient is cleared for discharge  Patient will be transported to [facility] via [place] [facility]  Patient's dtr in law Kofi Whiteside was informed of IMM   IMM is in the chart  CM obtained auth# [id number] from [facility] for [facility] transportation to [facility]  Medical necessity is in the chart  Patient, Claudia Buchanan, nurse and LUC are notified of above mentioned

## 2019-02-26 NOTE — PROGRESS NOTES
Progress Note - Infectious Disease   Renzo Davila  [de-identified] y o  male MRN: [id number]  Unit/Bed#: -01 Encounter: [id number]      Impression/Recommendations:  1   Septic shock, POA   Fever, leukocytosis, refractory hypotension   Due to # 2/3   Clinically improved, now off pressors  Rec:  ? Continue antibiotics as below  ? Follow temperatures closely  ? Supportive care as per the primary service     2   Enterococcus bacteremia  Russ Sires due to #3   Repeat blood cultures negative   TTE technically difficult but negative for obvious vegetation  Rec:  ? Continue ampicillin for 2 weeks IV antibiotics through 3/2/19  ? Check weekly CBC with diff and creatinine while on IV antibiotics  ? OK from ID perspective for PICC line placement at any time if needed for SNF     3   Obstructive Enterococcus pyelonephritis  Budd Calderon to right ureteral stone   Status post Ellis, PCN   Clinically improving  Rec:  ? Continue antibiotics as above  ? Continue Ellis and PCN per Urology with close follow-up ongoing     4   Parkinson's with history of CVA      The patient is stable from an ID standpoint for D/C to SNF      Antibiotics:  Ampicillin #9  Antibiotics #10    Subjective:  Patient seen on AM rounds  Limited ROS due to aphasia  24 Hour Events:  No documented fevers, chills, sweats, nausea, vomiting, or diarrhea  Getting shave per PCA  Objective:  Vitals:  Temp:  [97 7 °F (36 5 °C)-99 5 °F (37 5 °C)] 99 5 °F (37 5 °C)  HR:  [47-90] 77  Resp:  [17-18] 18  BP: (133-135)/(67-69) 133/67  SpO2:  [90 %-100 %] 99 %  Temp (24hrs), Av 6 °F (37 °C), Min:97 7 °F (36 5 °C), Max:99 5 °F (37 5 °C)  Current: Temperature: 99 5 °F (37 5 °C)    Physical Exam:   General:  No acute distress  Psychiatric:  Awake and alert  Pulmonary:  Normal respiratory excursion without accessory muscle use  Abdomen:  Soft, nontender  Extremities:  No edema  Skin:  No rashes    Lab Results:  I have personally reviewed pertinent labs    Results from last 7 days   Lab Units 02/23/19  1158 02/22/19  0505 02/20/19  0649   POTASSIUM mmol/L 3 6 3 1* 3 4*   CHLORIDE mmol/L 108 108 105   CO2 mmol/L 27 29 23   BUN mg/dL 20 21 25   CREATININE mg/dL 0 98 1 03 1 15   EGFR ml/min/1 73sq m 73 68 60   CALCIUM mg/dL 8 6 8 6 8 3     Results from last 7 days   Lab Units 02/22/19  0505 02/20/19  0649   WBC Thousand/uL 4 64 3 38*   HEMOGLOBIN g/dL 10 5* 10 2*   PLATELETS Thousands/uL 106* 83*           Imaging Studies:   I have personally reviewed pertinent imaging study reports and images in PACS  EKG, Pathology, and Other Studies:   I have personally reviewed pertinent reports

## 2019-02-26 NOTE — PLAN OF CARE
Problem: Prexisting or High Potential for Compromised Skin Integrity  Goal: Skin integrity is maintained or improved  Description  INTERVENTIONS:  - Identify patients at risk for skin breakdown  - Assess and monitor skin integrity  - Assess and monitor nutrition and hydration status  - Monitor labs (i e  albumin)  - Assess for incontinence   - Turn and reposition patient  - Assist with mobility/ambulation  - Relieve pressure over bony prominences  - Avoid friction and shearing  - Provide appropriate hygiene as needed including keeping skin clean and dry  - Evaluate need for skin moisturizer/barrier cream  - Collaborate with interdisciplinary team (i e  Nutrition, Rehabilitation, etc )   - Patient/family teaching  Outcome: Progressing     Problem: Potential for Falls  Goal: Patient will remain free of falls  Description  INTERVENTIONS:  - Assess patient frequently for physical needs  -  Identify cognitive and physical deficits and behaviors that affect risk of falls  -  Amado fall precautions as indicated by assessment   - Educate patient/family on patient safety including physical limitations  - Instruct patient to call for assistance with activity based on assessment  - Modify environment to reduce risk of injury  - Consider OT/PT consult to assist with strengthening/mobility  Outcome: Progressing     Problem: Nutrition/Hydration-ADULT  Goal: Nutrient/Hydration intake appropriate for improving, restoring or maintaining nutritional needs  Description  Monitor and assess patient's nutrition/hydration status for malnutrition (ex- brittle hair, bruises, dry skin, pale skin and conjunctiva, muscle wasting, smooth red tongue, and disorientation)  Collaborate with interdisciplinary team and initiate plan and interventions as ordered  Monitor patient's weight and dietary intake as ordered or per policy  Utilize nutrition screening tool and intervene per policy   Determine patient's food preferences and provide high-protein, high-caloric foods as appropriate       INTERVENTIONS:  - Monitor oral intake, urinary output, labs, and treatment plans  - Assess nutrition and hydration status and recommend course of action  - Evaluate amount of meals eaten  - Assist patient with eating if necessary   - Allow adequate time for meals  - Recommend/ encourage appropriate diets, oral nutritional supplements, and vitamin/mineral supplements  - Order, calculate, and assess calorie counts as needed  - Recommend, monitor, and adjust tube feedings based on assessed needs  - Assess need for intravenous fluids  - Provide low sodium nutrition/hydration education as appropriate  - Include patient/family/caregiver in decisions related to nutrition   Outcome: Progressing     Problem: DISCHARGE PLANNING - CARE MANAGEMENT  Goal: Discharge to post-acute care or home with appropriate resources  Description  INTERVENTIONS:  - Conduct assessment to determine patient/family and health care team treatment goals, and need for post-acute services based on payer coverage, community resources, and patient preferences, and barriers to discharge  - Address psychosocial, clinical, and financial barriers to discharge as identified in assessment in conjunction with the patient/family and health care team  - Arrange appropriate level of post-acute services according to patient?s   needs and preference and payer coverage in collaboration with the physician and health care team  - Communicate with and update the patient/family, physician, and health care team regarding progress on the discharge plan  - Arrange appropriate transportation to post-acute venues  Outcome: Progressing

## 2019-02-27 NOTE — UTILIZATION REVIEW
Notification of Discharge  This is a Notification of Discharge from our facility [address]  Please be advised that this patient has been discharge from our facility  Below you will find the admission and discharge date and time including the patients disposition  PRESENTATION DATE: 2/16/2019 12:44 PM  IP ADMISSION DATE: 2/16/19 1609  DISCHARGE DATE: 2/26/2019  6:55 PM  DISPOSITION: Non [facility] SNF/TCU/SNU    [address] Utilization Review Department  Phone: [phone]; Fax [phone]  [email or web address]  ATTENTION: Please call with any questions or concerns to [phone]  and carefully listen to the prompts so that you are directed to the right person  Send all requests for admission clinical reviews, approved or denied determinations and any other requests to fax [phone]   All voicemails are confidential

## 2019-03-12 NOTE — DISCHARGE SUMMARY
Discharge Summary - [facility]    Patient Information: Roxana Schaumann  [de-identified] y o  male MRN: [id number]  Unit/Bed#: -01 Encounter: [id number]    Discharging Physician / Practitioner: Charlie Barkley MD  PCP: Roque Martinez MD  Admission Date: 2/16/2019  Discharge Date: 03/12/19    Reason for Admission:  Weakness    Discharge Diagnoses:     Principal Problem:  ·   Severe Sepsis with shock [facility]): On admission source of infection was not obvious however patient was found to have a renal calculus with pyelonephritis  Patient developed worsening shock status post nephrostomy tube placement resolved at the time of discharge  Active Problems:  ·   Ambulatory dysfunction:  Secondary to Parkinson's disease  Continue rehab as tolerated  ·   Parkinson's disease with use of electrical brain stimulation [facility]):  Patient will be maintained on his son amount  ·   Type 2 diabetes mellitus ([facility] [address] ):  Continue long-acting insulin  ·   HTN (hypertension):  Stable at this time monitoring off metoprolol and hydrochlorothiazide  ·   S/P deep brain stimulator placement:  At baseline  ·   Weakness generalized:  ·   History of CVA (cerebrovascular accident)  ·   Hydronephrosis with urinary obstruction due to renal calculus:  Status post nephrostomy tube placement will follow up with Urology as outpatient  ·   Bacteremia:  The positive blood cultures for Enterococcus  Patient will complete ampicillin IV through 03/02/2019  Follow-up blood cultures have been negative to date  Resolved Problems:    * No resolved hospital problems  *      Consultations During Hospital Stay:  · Urology  · Interventional radiology  · Infectious Disease  · Wound care    Procedures Performed:     · 2D echo:  Ejection fraction 98% grade 1 diastolic dysfunction  · Chest x-ray no acute cardiopulmonary disease  · CT scan:  Right hydroureteronephrosis with a 6 mm calculus at the UV junction  Stable pancreatic cyst   Small right pleural effusion  Colonic diverticulosis  Significant Findings / Test Results:     · As above  · Creatinine 0 98  · WBC count 4 64  · Hemoglobin 10 5    Incidental Findings:   · None    Test Results Pending at Discharge (will require follow up): · None     Outpatient Tests Requested:  · None    Complications:  None    Hospital Course:     Armani Parikh  is a [de-identified] y o  male patient who originally presented to the hospital on 2/16/2019 due to generalized weakness  Patient had symptoms of SIRS without source for infection identified at that time  He ultimately was found on CT scan to have hydronephrosis with renal calculus at the UPJ junction  He was treated with IV cefepime  Blood cultures were positive for gram-positive cocci in pairs  The patient became excessively febrile to 104 3 status post nephrostomy tube placement and was transferred to the ICU  Urology was involved recommending nephrostomy tube placement and following creatinine which trended down from 2 0 to 1 96 status post nephrostomy tube placement  Patient has baseline CKD with a creatinine of 1 5  Patient was seen and examined by Infectious Disease  Patient's status was upgraded to septic shock related to right-sided pyelonephritis and obstructing right ureteral stone  Blood cultures grew enterococcus faecalis  Patient was placed on ampicillin and vancomycin was continued  Repeat blood cultures were obtained  Infectious Disease made recommendations to continue ampicillin through 03/02/2019  PICC line was cleared to be placed for long-term antibiotics, however due to the short course that was needed to be completed a peripheral IV access was accepted by the [facility] nursing facility for delivery of his antibiotics  2D echo was requested  Echo showed ejection fraction 88%, grade 1 diastolic dysfunction was noted  No mention for evidence for endocarditis noted    Wound care evaluation noted present on admission stage II pressure injury recommendations for local care were made  Patient's course was further complicated by known history of moderate oral pharyngeal dysphagia with known history of silent aspiration  Patient progressed to severe oral stage dysphagia with high risk aspiration  On 222 a PEG tube was placed with the understanding that this would be a temporary measure to bridge him to health  Discussed with patient's daughter at discharge  Answered questions regarding her desire for the patient to be able to feed by mouth as well as PEG  Explained this is a possible event before father in law this is not desirable at this time due to the dysphagia being present  Patient was seen and examined on the day of discharge  Patient is known to me from previous admission has declined significantly  Family's goals of care at this time are to maintain the patient has a do not resuscitate but attempt to rehabilitate his swallowing and support him nutritionally through PEG tube  Patient will complete antibiotics through 03/02/2019    Condition at Discharge: fair     Discharge Day Visit / Exam:     Subjective:  Patient is significantly declined since last admission  Patient minimally verbal with significant signs of parkinsonism  Vitals: Blood Pressure: 133/67 (02/26/19 0731)  Pulse: 77 (02/26/19 0920)  Temperature: 99 5 °F (37 5 °C) (02/26/19 0731)  Temp Source: Oral (02/25/19 1502)  Respirations: 18 (02/26/19 0731)  Height: 5' 11" (180 3 cm) (02/16/19 1830)  Weight - Scale: 87 kg (191 lb 12 8 oz) (02/26/19 0600)  SpO2: 99 % (02/26/19 0920)  Exam:   Physical Exam   General well-developed obese male no acute distress awakens to slight vocal stimuli not able to verbalize well  Positive masked faces with mouth open    Dry mucous membranes  Chest decreased poor air entry there is no rhonchi rales or wheezes  Cardiovascular regular rate rhythm positive S1 and S2 heard appreciate an S3-S4 murmur or gallop  Abdomen obese soft nontender nondistended with positive bowel sounds peg tube intact with minimal erythema no discharge  Extremities significant contracture and spasticity noted  Neurologically patient is awake alert oriented to self only not able to verbalize due to his parkinsonism    Discharge instructions/Information to patient and family:   See after visit summary for information provided to patient and family  Provisions for Follow-Up Care:  See after visit summary for information related to follow-up care and any pertinent home health orders  Disposition:     [address] to [facility] SNF:   · Not Applicable to this Patient - Not Applicable to this Patient    Planned Readmission:  None     Discharge Statement:  I spent 25 minutes discharging the patient  This time was spent on the day of discharge  I had direct contact with the patient on the day of discharge  Greater than 50% of the total time was spent examining patient, answering all patient questions, arranging and discussing plan of care with patient as well as directly providing post-discharge instructions  Additional time then spent on discharge activities  Discharge Medications:  See after visit summary for reconciled discharge medications provided to patient and family        ** Please Note: This note has been constructed using a voice recognition system **

## 2019-03-13 ENCOUNTER — TELEPHONE (OUTPATIENT)
Dept: UROLOGY | Facility: CLINIC | Age: 79
End: 2019-03-13

## 2019-03-13 NOTE — TELEPHONE ENCOUNTER
Provider - Palak Lugo  Location - [place] Kris Ramirez  Patient needs a follow up scheduled for post hospital follow up for nephrostomy tube check up  Scheduled within a week

## 2019-03-19 NOTE — PROGRESS NOTES
There are no diagnoses linked to this encounter  Assessment and plan:       1  Pyelonephritis causing severe sepsis with right UVJ calculus  - Arranged for right-sided ureteroscopy with stent placement and nephrostomy removal  - Patient will require urine culture 1 week prior to procedure  2  Hematuria  - Likely due to Ellis trauma, has resolved  Nga El PA-C      Chief Complaint     No chief complaint on file  History of Present Illness     Roxana Schaumann  is a 78 y o  male patient establishing care was [facility] for Urology following a hospital admission for severe sepsis with shock  During his admission he was found have a 6 mm right UVJ stone  With pyelonephritis and was treated with a nephrostomy tube  He was seen in the emergency department overnight last night for hematuria after Ellis trauma  They report that this has now cleared  Ellis catheter flushed in the office without issue  Patient's daughter is requesting removal of the nephrostomy tube  Repeat CT stone study was performed yesterday with stone still present  He was also started on antibiotics yesterday for urinary tract infection  This will need to be cleared prior to proceeding with ureteroscopy  Laboratory     Lab Results   Component Value Date    CREATININE 0 98 02/23/2019         No results found for this or any previous visit (from the past 1 hour(s))  Review of Systems     Review of Systems   Unable to perform ROS: Dementia                 Allergies     Allergies   Allergen Reactions    Clopidogrel Rash     PLAVIX       Physical Exam     Physical Exam   Cardiovascular: Normal rate, normal heart sounds and intact distal pulses  No murmur heard  Pulmonary/Chest: Effort normal and breath sounds normal  No stridor  He has no wheezes  Genitourinary:   Genitourinary Comments: Urine in Ellis bag was clear and yellow  Was easily flushed by nursing   Urine in nephrostomy bag was slightly cloudy  Insertion site non erythemic without drainage  Skin: Skin is warm and dry  Psychiatric:   Patient did not speak or answer any questions  Vital Signs     There were no vitals filed for this visit        Current Medications       Current Outpatient Medications:     aspirin 81 MG tablet, Take 81 mg by mouth every evening  , Disp: , Rfl:     atorvastatin (LIPITOR) 80 mg tablet, TAKE 1 TABLET BY MOUTH EVERY EVENING, Disp: , Rfl:     carbidopa-levodopa (SINEMET CR)  mg per tablet, Take 1 tablet by mouth daily at bedtime, Disp: 90 tablet, Rfl: 3    carbidopa-levodopa (SINEMET)  mg per tablet, Take 1 tablet by mouth 3 (three) times a day (Patient taking differently: Take 1 tablet by mouth 3 (three) times a day 0600; 1000; 1400 ), Disp: 270 tablet, Rfl: 3    chlorhexidine (PERIDEX) 0 12 % solution, Apply 15 mL to the mouth or throat every 12 (twelve) hours, Disp: 120 mL, Rfl: 0    Cyanocobalamin (VITAMIN B 12 PO), Take 250 mg by mouth  , Disp: , Rfl:     LANTUS SOLOSTAR 100 units/mL injection pen, Inject 20 Units under the skin daily at bedtime for 30 days, Disp: 2 pen, Rfl: 0    rOPINIRole (REQUIP XL) 2 MG 24 hr tablet, Take 1 tablet (2 mg total) by mouth daily at bedtime, Disp: 90 tablet, Rfl: 1      Active Problems     Patient Active Problem List   Diagnosis    Parkinson's disease with use of electrical brain stimulation (HCC)    S/P deep brain stimulator placement    Drooling    Dysphagia    Pancreatic cyst    Weakness generalized    Acute metabolic encephalopathy    Type 2 diabetes mellitus (HCC)    HTN (hypertension)    History of CVA (cerebrovascular accident)    Hypoglycemia    Concussion    Ambulatory dysfunction    Neurological movement disorder    Hydronephrosis with urinary obstruction due to renal calculus    Sepsis ([facility] [address] )    Bacteremia         Past Medical History     Past Medical History:   Diagnosis Date    Arthritis     BPH (benign prostatic hyperplasia)     Diabetes ([facility] [address] )     Heart problem     Movement disorder     Myocardial infarction ([facility] [address] )     Parkinson's disease ([facility] [address] )     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     Stroke [facility])          Surgical History     Past Surgical History:   Procedure Laterality Date    APPENDECTOMY      CARDIAC DEFIBRILLATOR PLACEMENT      CARDIAC DEFIBRILLATOR PLACEMENT      CHOLECYSTECTOMY      CORONARY ANGIOPLASTY WITH STENT PLACEMENT      DEEP BRAIN STIMULATOR PLACEMENT      EAR SURGERY      GALLBLADDER SURGERY      IR PEG/GJ TUBE PLACEMENT  2/22/2019    IR TUBE PLACEMENT NEPHROSTOMY  2/17/2019    KNEE SURGERY      SKIN CANCER EXCISION           Family History     Family History   Problem Relation Age of Onset    Cancer Mother     Diabetes Father     Parkinsonism Father     Heart attack Father          Social History     Social History       Radiology

## 2019-03-20 ENCOUNTER — APPOINTMENT (EMERGENCY)
Dept: CT IMAGING | Facility: HOSPITAL | Age: 79
DRG: 871 | End: 2019-03-20
Payer: COMMERCIAL

## 2019-03-20 ENCOUNTER — HOSPITAL ENCOUNTER (EMERGENCY)
Facility: HOSPITAL | Age: 79
Discharge: HOME | DRG: 871 | End: 2019-03-21
Attending: EMERGENCY MEDICINE | Admitting: EMERGENCY MEDICINE
Payer: COMMERCIAL

## 2019-03-20 ENCOUNTER — TELEPHONE (OUTPATIENT)
Dept: OTHER | Facility: HOSPITAL | Age: 79
End: 2019-03-20

## 2019-03-20 ENCOUNTER — TELEPHONE (OUTPATIENT)
Dept: UROLOGY | Facility: AMBULATORY SURGERY CENTER | Age: 79
End: 2019-03-20

## 2019-03-20 DIAGNOSIS — Z46.6 ENCOUNTER FOR FOLEY CATHETER REMOVAL: ICD-10-CM

## 2019-03-20 DIAGNOSIS — N39.0 UTI (URINARY TRACT INFECTION): Primary | ICD-10-CM

## 2019-03-20 LAB
ALBUMIN SERPL BCP-MCNC: 2.7 G/DL (ref 3.5–5)
ALP SERPL-CCNC: 166 U/L (ref 46–116)
ALT SERPL W P-5'-P-CCNC: 48 U/L (ref 12–78)
ANION GAP SERPL CALCULATED.3IONS-SCNC: 10 MMOL/L (ref 4–13)
APTT PPP: 28 SECONDS (ref 26–38)
AST SERPL W P-5'-P-CCNC: 32 U/L (ref 5–45)
BACTERIA UR QL AUTO: ABNORMAL /HPF
BASOPHILS # BLD AUTO: 0.03 THOUSANDS/ΜL (ref 0–0.1)
BASOPHILS NFR BLD AUTO: 0 % (ref 0–1)
BILIRUB SERPL-MCNC: 1.1 MG/DL (ref 0.2–1)
BILIRUB UR QL STRIP: NEGATIVE
BUN SERPL-MCNC: 31 MG/DL (ref 5–25)
CALCIUM SERPL-MCNC: 9.6 MG/DL (ref 8.3–10.1)
CHLORIDE SERPL-SCNC: 95 MMOL/L (ref 100–108)
CLARITY UR: ABNORMAL
CO2 SERPL-SCNC: 30 MMOL/L (ref 21–32)
COLOR UR: ABNORMAL
CREAT SERPL-MCNC: 1.35 MG/DL (ref 0.6–1.3)
EOSINOPHIL # BLD AUTO: 0.11 THOUSAND/ΜL (ref 0–0.61)
EOSINOPHIL NFR BLD AUTO: 1 % (ref 0–6)
ERYTHROCYTE [DISTWIDTH] IN BLOOD BY AUTOMATED COUNT: 14.7 % (ref 11.6–15.1)
GFR SERPL CREATININE-BSD FRML MDRD: 49 ML/MIN/1.73SQ M
GLUCOSE SERPL-MCNC: 250 MG/DL (ref 65–140)
GLUCOSE UR STRIP-MCNC: NEGATIVE MG/DL
HCT VFR BLD AUTO: 38.9 % (ref 36.5–49.3)
HGB BLD-MCNC: 12.5 G/DL (ref 12–17)
HGB UR QL STRIP.AUTO: ABNORMAL
IMM GRANULOCYTES # BLD AUTO: 0.11 THOUSAND/UL (ref 0–0.2)
IMM GRANULOCYTES NFR BLD AUTO: 1 % (ref 0–2)
INR PPP: 0.99 (ref 0.86–1.17)
KETONES UR STRIP-MCNC: NEGATIVE MG/DL
LEUKOCYTE ESTERASE UR QL STRIP: ABNORMAL
LYMPHOCYTES # BLD AUTO: 1.14 THOUSANDS/ΜL (ref 0.6–4.47)
LYMPHOCYTES NFR BLD AUTO: 8 % (ref 14–44)
MCH RBC QN AUTO: 31.1 PG (ref 26.8–34.3)
MCHC RBC AUTO-ENTMCNC: 32.1 G/DL (ref 31.4–37.4)
MCV RBC AUTO: 97 FL (ref 82–98)
MONOCYTES # BLD AUTO: 1.06 THOUSAND/ΜL (ref 0.17–1.22)
MONOCYTES NFR BLD AUTO: 8 % (ref 4–12)
NEUTROPHILS # BLD AUTO: 11.44 THOUSANDS/ΜL (ref 1.85–7.62)
NEUTS SEG NFR BLD AUTO: 82 % (ref 43–75)
NITRITE UR QL STRIP: POSITIVE
NON-SQ EPI CELLS URNS QL MICRO: ABNORMAL /HPF
NRBC BLD AUTO-RTO: 0 /100 WBCS
PH UR STRIP.AUTO: 8.5 [PH]
PLATELET # BLD AUTO: 208 THOUSANDS/UL (ref 149–390)
PMV BLD AUTO: 9.9 FL (ref 8.9–12.7)
POTASSIUM SERPL-SCNC: 5.2 MMOL/L (ref 3.5–5.3)
PROT SERPL-MCNC: 7.7 G/DL (ref 6.4–8.2)
PROT UR STRIP-MCNC: ABNORMAL MG/DL
PROTHROMBIN TIME: 13 SECONDS (ref 11.8–14.2)
RBC # BLD AUTO: 4.02 MILLION/UL (ref 3.88–5.62)
RBC #/AREA URNS AUTO: ABNORMAL /HPF
SODIUM SERPL-SCNC: 135 MMOL/L (ref 136–145)
SP GR UR STRIP.AUTO: 1 (ref 1–1.03)
UROBILINOGEN UR QL STRIP.AUTO: 0.2 E.U./DL
WBC # BLD AUTO: 13.89 THOUSAND/UL (ref 4.31–10.16)
WBC #/AREA URNS AUTO: ABNORMAL /HPF

## 2019-03-20 PROCEDURE — 74176 CT ABD & PELVIS W/O CONTRAST: CPT

## 2019-03-20 PROCEDURE — 85610 PROTHROMBIN TIME: CPT | Performed by: PHYSICIAN ASSISTANT

## 2019-03-20 PROCEDURE — 87086 URINE CULTURE/COLONY COUNT: CPT | Performed by: PHYSICIAN ASSISTANT

## 2019-03-20 PROCEDURE — 36415 COLL VENOUS BLD VENIPUNCTURE: CPT | Performed by: PHYSICIAN ASSISTANT

## 2019-03-20 PROCEDURE — 87186 SC STD MICRODIL/AGAR DIL: CPT | Performed by: PHYSICIAN ASSISTANT

## 2019-03-20 PROCEDURE — 85730 THROMBOPLASTIN TIME PARTIAL: CPT | Performed by: PHYSICIAN ASSISTANT

## 2019-03-20 PROCEDURE — 96361 HYDRATE IV INFUSION ADD-ON: CPT

## 2019-03-20 PROCEDURE — 80053 COMPREHEN METABOLIC PANEL: CPT | Performed by: PHYSICIAN ASSISTANT

## 2019-03-20 PROCEDURE — 81001 URINALYSIS AUTO W/SCOPE: CPT | Performed by: PHYSICIAN ASSISTANT

## 2019-03-20 PROCEDURE — 99284 EMERGENCY DEPT VISIT MOD MDM: CPT

## 2019-03-20 PROCEDURE — 85025 COMPLETE CBC W/AUTO DIFF WBC: CPT | Performed by: PHYSICIAN ASSISTANT

## 2019-03-20 PROCEDURE — 96360 HYDRATION IV INFUSION INIT: CPT

## 2019-03-20 PROCEDURE — 87077 CULTURE AEROBIC IDENTIFY: CPT | Performed by: PHYSICIAN ASSISTANT

## 2019-03-20 RX ORDER — LIDOCAINE HYDROCHLORIDE 20 MG/ML
JELLY TOPICAL ONCE
Status: COMPLETED | OUTPATIENT
Start: 2019-03-20 | End: 2019-03-20

## 2019-03-20 RX ORDER — NITROFURANTOIN 25; 75 MG/1; MG/1
100 CAPSULE ORAL 2 TIMES DAILY
Qty: 14 CAPSULE | Refills: 0 | Status: SHIPPED | OUTPATIENT
Start: 2019-03-20 | End: 2019-03-21

## 2019-03-20 RX ORDER — CEPHALEXIN 500 MG/1
500 CAPSULE ORAL 2 TIMES DAILY
Qty: 14 CAPSULE | Refills: 0 | Status: SHIPPED | OUTPATIENT
Start: 2019-03-20 | End: 2019-03-20 | Stop reason: CLARIF

## 2019-03-20 RX ADMIN — SODIUM CHLORIDE 1000 ML: 0.9 INJECTION, SOLUTION INTRAVENOUS at 20:14

## 2019-03-20 RX ADMIN — LIDOCAINE HYDROCHLORIDE: 20 JELLY TOPICAL at 20:03

## 2019-03-20 NOTE — TELEPHONE ENCOUNTER
Pham Prasad from [facility] called said she would like to speak with the nurse about his nephrostomy tube [phone]

## 2019-03-20 NOTE — TELEPHONE ENCOUNTER
Patient seen by Dr Reji Del Cid while in the hospital on 2/17/19  Patient was septic form right ureteral stone and had a nephrostomy tube placed  Called and spoke to Debra Lee is reporting that 10 minutes ago patient was transferred to chair and that there is some blood in nephrostomy tube  Debra Lee wants to know if she should flush nephrostomy tube  Gave verbal orders for Felipa to flush nephrostomy tube  Patient is scheduled to follow up tomorrow with Shikha Lee also wants to make us aware that patient has a ryan in  Debra Lee is also requesting ryan catheter orders to keep cathter in place  Patient has a stage 2 pressure ulcer       Fax number: [phone]

## 2019-03-21 ENCOUNTER — HOSPITAL ENCOUNTER (INPATIENT)
Facility: HOSPITAL | Age: 79
LOS: 6 days | Discharge: HOME HEALTH | DRG: 871 | End: 2019-03-27
Attending: EMERGENCY MEDICINE | Admitting: INTERNAL MEDICINE
Payer: COMMERCIAL

## 2019-03-21 ENCOUNTER — APPOINTMENT (EMERGENCY)
Dept: CT IMAGING | Facility: HOSPITAL | Age: 79
DRG: 871 | End: 2019-03-21
Payer: COMMERCIAL

## 2019-03-21 ENCOUNTER — OFFICE VISIT (OUTPATIENT)
Dept: UROLOGY | Facility: CLINIC | Age: 79
End: 2019-03-21
Payer: COMMERCIAL

## 2019-03-21 VITALS
TEMPERATURE: 99.6 F | DIASTOLIC BLOOD PRESSURE: 50 MMHG | RESPIRATION RATE: 18 BRPM | WEIGHT: 184.75 LBS | BODY MASS INDEX: 25.77 KG/M2 | HEART RATE: 71 BPM | OXYGEN SATURATION: 95 % | SYSTOLIC BLOOD PRESSURE: 94 MMHG

## 2019-03-21 VITALS — SYSTOLIC BLOOD PRESSURE: 108 MMHG | DIASTOLIC BLOOD PRESSURE: 78 MMHG

## 2019-03-21 DIAGNOSIS — R65.20 SEVERE SEPSIS (HCC): Primary | ICD-10-CM

## 2019-03-21 DIAGNOSIS — N39.0 URINARY TRACT INFECTION: ICD-10-CM

## 2019-03-21 DIAGNOSIS — L89.90 DECUBITUS SKIN ULCER: ICD-10-CM

## 2019-03-21 DIAGNOSIS — G24.9 NEUROLOGICAL MOVEMENT DISORDER: ICD-10-CM

## 2019-03-21 DIAGNOSIS — R13.10 DYSPHAGIA, UNSPECIFIED TYPE: ICD-10-CM

## 2019-03-21 DIAGNOSIS — N20.0 NEPHROLITHIASIS: Primary | ICD-10-CM

## 2019-03-21 DIAGNOSIS — G20 PARKINSON'S DISEASE WITH USE OF ELECTRICAL BRAIN STIMULATION (HCC): ICD-10-CM

## 2019-03-21 DIAGNOSIS — E11.9 TYPE 2 DIABETES MELLITUS (HCC): ICD-10-CM

## 2019-03-21 DIAGNOSIS — R41.82 ALTERED MENTAL STATUS: ICD-10-CM

## 2019-03-21 DIAGNOSIS — A41.9 SEVERE SEPSIS (HCC): Primary | ICD-10-CM

## 2019-03-21 DIAGNOSIS — G93.41 ACUTE METABOLIC ENCEPHALOPATHY: ICD-10-CM

## 2019-03-21 PROBLEM — N17.9 ACUTE KIDNEY INJURY (HCC): Status: ACTIVE | Noted: 2019-03-21

## 2019-03-21 LAB
ALBUMIN SERPL BCP-MCNC: 2.6 G/DL (ref 3.5–5)
ALP SERPL-CCNC: 156 U/L (ref 46–116)
ALT SERPL W P-5'-P-CCNC: 43 U/L (ref 12–78)
ANION GAP BLD CALC-SCNC: 13 MMOL/L (ref 4–13)
ANION GAP SERPL CALCULATED.3IONS-SCNC: 9 MMOL/L (ref 4–13)
APTT PPP: 31 SECONDS (ref 26–38)
AST SERPL W P-5'-P-CCNC: 31 U/L (ref 5–45)
ATRIAL RATE: 101 BPM
BACTERIA UR QL AUTO: ABNORMAL /HPF
BACTERIA UR QL AUTO: ABNORMAL /HPF
BASE EX.OXY STD BLDV CALC-SCNC: 48.9 % (ref 60–80)
BASE EXCESS BLDV CALC-SCNC: 1.9 MMOL/L
BASOPHILS # BLD AUTO: 0.03 THOUSANDS/ΜL (ref 0–0.1)
BASOPHILS NFR BLD AUTO: 0 % (ref 0–1)
BILIRUB DIRECT SERPL-MCNC: 0.3 MG/DL (ref 0–0.2)
BILIRUB SERPL-MCNC: 1.6 MG/DL (ref 0.2–1)
BILIRUB UR QL STRIP: NEGATIVE
BILIRUB UR QL STRIP: NEGATIVE
BUN BLD-MCNC: 30 MG/DL (ref 5–25)
BUN SERPL-MCNC: 31 MG/DL (ref 5–25)
CA-I BLD-SCNC: 1.13 MMOL/L (ref 1.12–1.32)
CALCIUM SERPL-MCNC: 9 MG/DL (ref 8.3–10.1)
CHLORIDE BLD-SCNC: 100 MMOL/L (ref 100–108)
CHLORIDE SERPL-SCNC: 99 MMOL/L (ref 100–108)
CLARITY UR: ABNORMAL
CLARITY UR: ABNORMAL
CO2 SERPL-SCNC: 27 MMOL/L (ref 21–32)
COLOR UR: YELLOW
COLOR UR: YELLOW
CREAT BLD-MCNC: 1.1 MG/DL (ref 0.6–1.3)
CREAT SERPL-MCNC: 1.48 MG/DL (ref 0.6–1.3)
EOSINOPHIL # BLD AUTO: 0.01 THOUSAND/ΜL (ref 0–0.61)
EOSINOPHIL NFR BLD AUTO: 0 % (ref 0–6)
ERYTHROCYTE [DISTWIDTH] IN BLOOD BY AUTOMATED COUNT: 15 % (ref 11.6–15.1)
GFR SERPL CREATININE-BSD FRML MDRD: 44 ML/MIN/1.73SQ M
GFR SERPL CREATININE-BSD FRML MDRD: 63 ML/MIN/1.73SQ M
GLUCOSE SERPL-MCNC: 304 MG/DL (ref 65–140)
GLUCOSE SERPL-MCNC: 307 MG/DL (ref 65–140)
GLUCOSE UR STRIP-MCNC: ABNORMAL MG/DL
GLUCOSE UR STRIP-MCNC: ABNORMAL MG/DL
HCO3 BLDV-SCNC: 28.2 MMOL/L (ref 24–30)
HCT VFR BLD AUTO: 37.1 % (ref 36.5–49.3)
HCT VFR BLD CALC: 39 % (ref 36.5–49.3)
HGB BLD-MCNC: 11.7 G/DL (ref 12–17)
HGB BLDA-MCNC: 13.3 G/DL (ref 12–17)
HGB UR QL STRIP.AUTO: ABNORMAL
HGB UR QL STRIP.AUTO: ABNORMAL
IMM GRANULOCYTES # BLD AUTO: 0.17 THOUSAND/UL (ref 0–0.2)
IMM GRANULOCYTES NFR BLD AUTO: 1 % (ref 0–2)
INR PPP: 1.15 (ref 0.86–1.17)
KETONES UR STRIP-MCNC: NEGATIVE MG/DL
KETONES UR STRIP-MCNC: NEGATIVE MG/DL
LACTATE SERPL-SCNC: 1.8 MMOL/L (ref 0.5–2)
LACTATE SERPL-SCNC: 3.6 MMOL/L (ref 0.5–2)
LEUKOCYTE ESTERASE UR QL STRIP: ABNORMAL
LEUKOCYTE ESTERASE UR QL STRIP: ABNORMAL
LIPASE SERPL-CCNC: 254 U/L (ref 73–393)
LYMPHOCYTES # BLD AUTO: 1.18 THOUSANDS/ΜL (ref 0.6–4.47)
LYMPHOCYTES NFR BLD AUTO: 7 % (ref 14–44)
MAGNESIUM SERPL-MCNC: 1.9 MG/DL (ref 1.6–2.6)
MCH RBC QN AUTO: 30.8 PG (ref 26.8–34.3)
MCHC RBC AUTO-ENTMCNC: 31.5 G/DL (ref 31.4–37.4)
MCV RBC AUTO: 98 FL (ref 82–98)
MONOCYTES # BLD AUTO: 1.41 THOUSAND/ΜL (ref 0.17–1.22)
MONOCYTES NFR BLD AUTO: 8 % (ref 4–12)
NEUTROPHILS # BLD AUTO: 15.27 THOUSANDS/ΜL (ref 1.85–7.62)
NEUTS SEG NFR BLD AUTO: 84 % (ref 43–75)
NITRITE UR QL STRIP: POSITIVE
NITRITE UR QL STRIP: POSITIVE
NON-SQ EPI CELLS URNS QL MICRO: ABNORMAL /HPF
NON-SQ EPI CELLS URNS QL MICRO: ABNORMAL /HPF
NRBC BLD AUTO-RTO: 0 /100 WBCS
NT-PROBNP SERPL-MCNC: 2033 PG/ML
O2 CT BLDV-SCNC: 8.9 ML/DL
P AXIS: 92 DEGREES
PCO2 BLD: 26 MMOL/L (ref 21–32)
PCO2 BLDV: 51.4 MM HG (ref 42–50)
PH BLDV: 7.36 [PH] (ref 7.3–7.4)
PH UR STRIP.AUTO: 8.5 [PH]
PH UR STRIP.AUTO: 8.5 [PH]
PLATELET # BLD AUTO: 217 THOUSANDS/UL (ref 149–390)
PMV BLD AUTO: 9.9 FL (ref 8.9–12.7)
PO2 BLDV: 29.3 MM HG (ref 35–45)
POTASSIUM BLD-SCNC: 4.6 MMOL/L (ref 3.5–5.3)
POTASSIUM SERPL-SCNC: 4.7 MMOL/L (ref 3.5–5.3)
PR INTERVAL: 166 MS
PROT SERPL-MCNC: 7.1 G/DL (ref 6.4–8.2)
PROT UR STRIP-MCNC: ABNORMAL MG/DL
PROT UR STRIP-MCNC: ABNORMAL MG/DL
PROTHROMBIN TIME: 14.6 SECONDS (ref 11.8–14.2)
QRS AXIS: 10 DEGREES
QRSD INTERVAL: 124 MS
QT INTERVAL: 382 MS
QTC INTERVAL: 495 MS
RBC # BLD AUTO: 3.8 MILLION/UL (ref 3.88–5.62)
RBC #/AREA URNS AUTO: ABNORMAL /HPF
RBC #/AREA URNS AUTO: ABNORMAL /HPF
SODIUM BLD-SCNC: 134 MMOL/L (ref 136–145)
SODIUM SERPL-SCNC: 135 MMOL/L (ref 136–145)
SP GR UR STRIP.AUTO: <=1.005 (ref 1–1.03)
SP GR UR STRIP.AUTO: <=1.005 (ref 1–1.03)
SPECIMEN SOURCE: ABNORMAL
T WAVE AXIS: 15 DEGREES
TRI-PHOS CRY URNS QL MICRO: ABNORMAL /HPF
TRI-PHOS CRY URNS QL MICRO: ABNORMAL /HPF
TROPONIN I SERPL-MCNC: <0.02 NG/ML
TSH SERPL DL<=0.05 MIU/L-ACNC: 6.16 UIU/ML (ref 0.36–3.74)
UROBILINOGEN UR QL STRIP.AUTO: 0.2 E.U./DL
UROBILINOGEN UR QL STRIP.AUTO: 0.2 E.U./DL
VENTRICULAR RATE: 101 BPM
WBC # BLD AUTO: 18.07 THOUSAND/UL (ref 4.31–10.16)
WBC #/AREA URNS AUTO: ABNORMAL /HPF
WBC #/AREA URNS AUTO: ABNORMAL /HPF

## 2019-03-21 PROCEDURE — 84443 ASSAY THYROID STIM HORMONE: CPT | Performed by: EMERGENCY MEDICINE

## 2019-03-21 PROCEDURE — 99285 EMERGENCY DEPT VISIT HI MDM: CPT

## 2019-03-21 PROCEDURE — 85610 PROTHROMBIN TIME: CPT | Performed by: EMERGENCY MEDICINE

## 2019-03-21 PROCEDURE — 87077 CULTURE AEROBIC IDENTIFY: CPT | Performed by: EMERGENCY MEDICINE

## 2019-03-21 PROCEDURE — 36415 COLL VENOUS BLD VENIPUNCTURE: CPT | Performed by: EMERGENCY MEDICINE

## 2019-03-21 PROCEDURE — 80076 HEPATIC FUNCTION PANEL: CPT | Performed by: EMERGENCY MEDICINE

## 2019-03-21 PROCEDURE — 96366 THER/PROPH/DIAG IV INF ADDON: CPT

## 2019-03-21 PROCEDURE — 83690 ASSAY OF LIPASE: CPT | Performed by: EMERGENCY MEDICINE

## 2019-03-21 PROCEDURE — 84484 ASSAY OF TROPONIN QUANT: CPT | Performed by: EMERGENCY MEDICINE

## 2019-03-21 PROCEDURE — 81001 URINALYSIS AUTO W/SCOPE: CPT | Performed by: EMERGENCY MEDICINE

## 2019-03-21 PROCEDURE — 93005 ELECTROCARDIOGRAM TRACING: CPT

## 2019-03-21 PROCEDURE — 99213 OFFICE O/P EST LOW 20 MIN: CPT | Performed by: PHYSICIAN ASSISTANT

## 2019-03-21 PROCEDURE — 80048 BASIC METABOLIC PNL TOTAL CA: CPT | Performed by: EMERGENCY MEDICINE

## 2019-03-21 PROCEDURE — 85730 THROMBOPLASTIN TIME PARTIAL: CPT | Performed by: EMERGENCY MEDICINE

## 2019-03-21 PROCEDURE — 82948 REAGENT STRIP/BLOOD GLUCOSE: CPT

## 2019-03-21 PROCEDURE — 70450 CT HEAD/BRAIN W/O DYE: CPT

## 2019-03-21 PROCEDURE — 87186 SC STD MICRODIL/AGAR DIL: CPT | Performed by: EMERGENCY MEDICINE

## 2019-03-21 PROCEDURE — 74176 CT ABD & PELVIS W/O CONTRAST: CPT

## 2019-03-21 PROCEDURE — 87040 BLOOD CULTURE FOR BACTERIA: CPT | Performed by: EMERGENCY MEDICINE

## 2019-03-21 PROCEDURE — 83605 ASSAY OF LACTIC ACID: CPT | Performed by: EMERGENCY MEDICINE

## 2019-03-21 PROCEDURE — 96368 THER/DIAG CONCURRENT INF: CPT

## 2019-03-21 PROCEDURE — 85025 COMPLETE CBC W/AUTO DIFF WBC: CPT | Performed by: EMERGENCY MEDICINE

## 2019-03-21 PROCEDURE — 80047 BASIC METABLC PNL IONIZED CA: CPT

## 2019-03-21 PROCEDURE — 71250 CT THORAX DX C-: CPT

## 2019-03-21 PROCEDURE — 96365 THER/PROPH/DIAG IV INF INIT: CPT

## 2019-03-21 PROCEDURE — 93010 ELECTROCARDIOGRAM REPORT: CPT | Performed by: INTERNAL MEDICINE

## 2019-03-21 PROCEDURE — 84145 PROCALCITONIN (PCT): CPT | Performed by: EMERGENCY MEDICINE

## 2019-03-21 PROCEDURE — 83735 ASSAY OF MAGNESIUM: CPT | Performed by: EMERGENCY MEDICINE

## 2019-03-21 PROCEDURE — 85014 HEMATOCRIT: CPT

## 2019-03-21 PROCEDURE — 99223 1ST HOSP IP/OBS HIGH 75: CPT | Performed by: PHYSICIAN ASSISTANT

## 2019-03-21 PROCEDURE — 96361 HYDRATE IV INFUSION ADD-ON: CPT

## 2019-03-21 PROCEDURE — 87631 RESP VIRUS 3-5 TARGETS: CPT | Performed by: EMERGENCY MEDICINE

## 2019-03-21 PROCEDURE — 82805 BLOOD GASES W/O2 SATURATION: CPT | Performed by: EMERGENCY MEDICINE

## 2019-03-21 PROCEDURE — 83880 ASSAY OF NATRIURETIC PEPTIDE: CPT | Performed by: EMERGENCY MEDICINE

## 2019-03-21 RX ADMIN — VANCOMYCIN HYDROCHLORIDE 1250 MG: 1 INJECTION, POWDER, LYOPHILIZED, FOR SOLUTION INTRAVENOUS at 21:10

## 2019-03-21 RX ADMIN — SODIUM CHLORIDE 500 ML: 9 INJECTION, SOLUTION INTRAVENOUS at 19:52

## 2019-03-21 RX ADMIN — CEFEPIME 2000 MG: 2 INJECTION, POWDER, FOR SOLUTION INTRAVENOUS at 20:31

## 2019-03-21 RX ADMIN — SODIUM CHLORIDE 1000 ML: 0.9 INJECTION, SOLUTION INTRAVENOUS at 21:11
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
## 2019-03-22 PROBLEM — L89.159 SACRAL DECUBITUS ULCER: Status: ACTIVE | Noted: 2019-03-22

## 2019-03-22 PROBLEM — R78.81 BACTEREMIA DUE TO GRAM-NEGATIVE BACTERIA: Status: ACTIVE | Noted: 2019-03-22

## 2019-03-22 LAB
ALBUMIN SERPL BCP-MCNC: 2.3 G/DL (ref 3.5–5)
ALP SERPL-CCNC: 110 U/L (ref 46–116)
ALT SERPL W P-5'-P-CCNC: 11 U/L (ref 12–78)
ANION GAP SERPL CALCULATED.3IONS-SCNC: 10 MMOL/L (ref 4–13)
AST SERPL W P-5'-P-CCNC: 29 U/L (ref 5–45)
BACTERIA UR CULT: ABNORMAL
BASOPHILS # BLD AUTO: 0.06 THOUSANDS/ΜL (ref 0–0.1)
BASOPHILS NFR BLD AUTO: 0 % (ref 0–1)
BILIRUB SERPL-MCNC: 1.7 MG/DL (ref 0.2–1)
BUN SERPL-MCNC: 30 MG/DL (ref 5–25)
CALCIUM SERPL-MCNC: 8.9 MG/DL (ref 8.3–10.1)
CHLORIDE SERPL-SCNC: 103 MMOL/L (ref 100–108)
CO2 SERPL-SCNC: 24 MMOL/L (ref 21–32)
CREAT SERPL-MCNC: 1.31 MG/DL (ref 0.6–1.3)
EOSINOPHIL # BLD AUTO: 0.03 THOUSAND/ΜL (ref 0–0.61)
EOSINOPHIL NFR BLD AUTO: 0 % (ref 0–6)
ERYTHROCYTE [DISTWIDTH] IN BLOOD BY AUTOMATED COUNT: 14.9 % (ref 11.6–15.1)
FLUAV AG SPEC QL: NOT DETECTED
FLUBV AG SPEC QL: NOT DETECTED
GFR SERPL CREATININE-BSD FRML MDRD: 51 ML/MIN/1.73SQ M
GLUCOSE SERPL-MCNC: 172 MG/DL (ref 65–140)
GLUCOSE SERPL-MCNC: 185 MG/DL (ref 65–140)
GLUCOSE SERPL-MCNC: 198 MG/DL (ref 65–140)
GLUCOSE SERPL-MCNC: 198 MG/DL (ref 65–140)
GLUCOSE SERPL-MCNC: 231 MG/DL (ref 65–140)
HCT VFR BLD AUTO: 33.5 % (ref 36.5–49.3)
HGB BLD-MCNC: 10.8 G/DL (ref 12–17)
IMM GRANULOCYTES # BLD AUTO: 0.17 THOUSAND/UL (ref 0–0.2)
IMM GRANULOCYTES NFR BLD AUTO: 1 % (ref 0–2)
LYMPHOCYTES # BLD AUTO: 1.52 THOUSANDS/ΜL (ref 0.6–4.47)
LYMPHOCYTES NFR BLD AUTO: 8 % (ref 14–44)
MCH RBC QN AUTO: 31.3 PG (ref 26.8–34.3)
MCHC RBC AUTO-ENTMCNC: 32.2 G/DL (ref 31.4–37.4)
MCV RBC AUTO: 97 FL (ref 82–98)
MONOCYTES # BLD AUTO: 1.75 THOUSAND/ΜL (ref 0.17–1.22)
MONOCYTES NFR BLD AUTO: 9 % (ref 4–12)
NEUTROPHILS # BLD AUTO: 16.86 THOUSANDS/ΜL (ref 1.85–7.62)
NEUTS SEG NFR BLD AUTO: 82 % (ref 43–75)
NRBC BLD AUTO-RTO: 0 /100 WBCS
PLATELET # BLD AUTO: 202 THOUSANDS/UL (ref 149–390)
PMV BLD AUTO: 10.3 FL (ref 8.9–12.7)
POTASSIUM SERPL-SCNC: 3.9 MMOL/L (ref 3.5–5.3)
PROCALCITONIN SERPL-MCNC: 1.16 NG/ML
PROT SERPL-MCNC: 6.8 G/DL (ref 6.4–8.2)
RBC # BLD AUTO: 3.45 MILLION/UL (ref 3.88–5.62)
RSV B RNA SPEC QL NAA+PROBE: NOT DETECTED
SODIUM SERPL-SCNC: 137 MMOL/L (ref 136–145)
WBC # BLD AUTO: 20.39 THOUSAND/UL (ref 4.31–10.16)

## 2019-03-22 PROCEDURE — 80053 COMPREHEN METABOLIC PANEL: CPT | Performed by: INTERNAL MEDICINE

## 2019-03-22 PROCEDURE — 82948 REAGENT STRIP/BLOOD GLUCOSE: CPT

## 2019-03-22 PROCEDURE — 92610 EVALUATE SWALLOWING FUNCTION: CPT

## 2019-03-22 PROCEDURE — 99232 SBSQ HOSP IP/OBS MODERATE 35: CPT | Performed by: FAMILY MEDICINE

## 2019-03-22 PROCEDURE — 85025 COMPLETE CBC W/AUTO DIFF WBC: CPT | Performed by: INTERNAL MEDICINE

## 2019-03-22 RX ORDER — ASPIRIN 81 MG/1
81 TABLET, CHEWABLE ORAL EVERY EVENING
Status: DISCONTINUED | OUTPATIENT
Start: 2019-03-22 | End: 2019-03-27 | Stop reason: HOSPADM

## 2019-03-22 RX ORDER — ROPINIROLE 0.25 MG/1
0.75 TABLET, FILM COATED ORAL 3 TIMES DAILY
Status: DISCONTINUED | OUTPATIENT
Start: 2019-03-22 | End: 2019-03-22

## 2019-03-22 RX ORDER — CHLORHEXIDINE GLUCONATE 0.12 MG/ML
15 RINSE ORAL EVERY 12 HOURS SCHEDULED
Status: DISCONTINUED | OUTPATIENT
Start: 2019-03-22 | End: 2019-03-27 | Stop reason: HOSPADM

## 2019-03-22 RX ORDER — ROPINIROLE 0.25 MG/1
0.5 TABLET, FILM COATED ORAL 3 TIMES DAILY
Status: DISCONTINUED | OUTPATIENT
Start: 2019-03-22 | End: 2019-03-22 | Stop reason: SDUPTHER

## 2019-03-22 RX ORDER — ATORVASTATIN CALCIUM 40 MG/1
80 TABLET, FILM COATED ORAL EVERY EVENING
Status: DISCONTINUED | OUTPATIENT
Start: 2019-03-22 | End: 2019-03-27 | Stop reason: HOSPADM

## 2019-03-22 RX ORDER — ROPINIROLE 2 MG/1
2 TABLET, FILM COATED, EXTENDED RELEASE ORAL
Status: DISCONTINUED | OUTPATIENT
Start: 2019-03-22 | End: 2019-03-22

## 2019-03-22 RX ORDER — ACETAMINOPHEN 325 MG/1
650 TABLET ORAL EVERY 6 HOURS PRN
Status: DISCONTINUED | OUTPATIENT
Start: 2019-03-22 | End: 2019-03-27 | Stop reason: HOSPADM

## 2019-03-22 RX ORDER — CARBIDOPA AND LEVODOPA 50; 200 MG/1; MG/1
1 TABLET, EXTENDED RELEASE ORAL
Status: DISCONTINUED | OUTPATIENT
Start: 2019-03-22 | End: 2019-03-22

## 2019-03-22 RX ORDER — ROPINIROLE 0.25 MG/1
0.25 TABLET, FILM COATED ORAL 3 TIMES DAILY
Status: DISCONTINUED | OUTPATIENT
Start: 2019-03-22 | End: 2019-03-22

## 2019-03-22 RX ORDER — ROPINIROLE 0.25 MG/1
2 TABLET, FILM COATED ORAL
Status: DISCONTINUED | OUTPATIENT
Start: 2019-03-22 | End: 2019-03-27 | Stop reason: HOSPADM

## 2019-03-22 RX ADMIN — CEFEPIME 2000 MG: 2 INJECTION, POWDER, FOR SOLUTION INTRAVENOUS at 08:45

## 2019-03-22 RX ADMIN — CHLORHEXIDINE GLUCONATE 15 ML: 1.2 RINSE ORAL at 22:00

## 2019-03-22 RX ADMIN — ENOXAPARIN SODIUM 40 MG: 40 INJECTION SUBCUTANEOUS at 08:44

## 2019-03-22 RX ADMIN — CARBIDOPA AND LEVODOPA 2 TABLET: 25; 100 TABLET ORAL at 23:00

## 2019-03-22 RX ADMIN — SODIUM CHLORIDE, SODIUM LACTATE, POTASSIUM CHLORIDE, AND CALCIUM CHLORIDE 500 ML: .6; .31; .03; .02 INJECTION, SOLUTION INTRAVENOUS at 16:01

## 2019-03-22 RX ADMIN — CARBIDOPA AND LEVODOPA 1 TABLET: 25; 100 TABLET ORAL at 08:45

## 2019-03-22 RX ADMIN — ATORVASTATIN CALCIUM 80 MG: 40 TABLET, FILM COATED ORAL at 02:27

## 2019-03-22 RX ADMIN — CHLORHEXIDINE GLUCONATE 15 ML: 1.2 RINSE ORAL at 08:44

## 2019-03-22 RX ADMIN — CEFEPIME 2000 MG: 2 INJECTION, POWDER, FOR SOLUTION INTRAVENOUS at 23:12

## 2019-03-22 RX ADMIN — ROPINIROLE 2 MG: 0.25 TABLET, FILM COATED ORAL at 23:00

## 2019-03-22 RX ADMIN — CARBIDOPA AND LEVODOPA 1 TABLET: 25; 100 TABLET ORAL at 12:07

## 2019-03-22 RX ADMIN — VANCOMYCIN HYDROCHLORIDE 1750 MG: 1 INJECTION, POWDER, LYOPHILIZED, FOR SOLUTION INTRAVENOUS at 15:10

## 2019-03-22 RX ADMIN — ROPINIROLE 2 MG: 0.25 TABLET, FILM COATED ORAL at 02:26

## 2019-03-22 RX ADMIN — INSULIN LISPRO 1 UNITS: 100 INJECTION, SOLUTION INTRAVENOUS; SUBCUTANEOUS at 18:12

## 2019-03-22 RX ADMIN — ASPIRIN 81 MG 81 MG: 81 TABLET ORAL at 02:28

## 2019-03-22 RX ADMIN — ATORVASTATIN CALCIUM 80 MG: 40 TABLET, FILM COATED ORAL at 17:46

## 2019-03-22 RX ADMIN — CARBIDOPA AND LEVODOPA 1 TABLET: 25; 100 TABLET ORAL at 17:46

## 2019-03-22 RX ADMIN — ACETAMINOPHEN 650 MG: 325 TABLET, FILM COATED ORAL at 02:32

## 2019-03-22 RX ADMIN — ASPIRIN 81 MG 81 MG: 81 TABLET ORAL at 17:46

## 2019-03-22 RX ADMIN — CHLORHEXIDINE GLUCONATE 15 ML: 1.2 RINSE ORAL at 02:28

## 2019-03-22 RX ADMIN — CARBIDOPA AND LEVODOPA 2 TABLET: 25; 100 TABLET ORAL at 02:32

## 2019-03-23 LAB
ANION GAP SERPL CALCULATED.3IONS-SCNC: 9 MMOL/L (ref 4–13)
BASOPHILS # BLD AUTO: 0.03 THOUSANDS/ΜL (ref 0–0.1)
BASOPHILS NFR BLD AUTO: 0 % (ref 0–1)
BUN SERPL-MCNC: 33 MG/DL (ref 5–25)
CALCIUM SERPL-MCNC: 8.5 MG/DL (ref 8.3–10.1)
CHLORIDE SERPL-SCNC: 104 MMOL/L (ref 100–108)
CO2 SERPL-SCNC: 24 MMOL/L (ref 21–32)
CREAT SERPL-MCNC: 1.31 MG/DL (ref 0.6–1.3)
EOSINOPHIL # BLD AUTO: 0.19 THOUSAND/ΜL (ref 0–0.61)
EOSINOPHIL NFR BLD AUTO: 2 % (ref 0–6)
ERYTHROCYTE [DISTWIDTH] IN BLOOD BY AUTOMATED COUNT: 14.9 % (ref 11.6–15.1)
GFR SERPL CREATININE-BSD FRML MDRD: 51 ML/MIN/1.73SQ M
GLUCOSE SERPL-MCNC: 272 MG/DL (ref 65–140)
GLUCOSE SERPL-MCNC: 279 MG/DL (ref 65–140)
GLUCOSE SERPL-MCNC: 286 MG/DL (ref 65–140)
GLUCOSE SERPL-MCNC: 327 MG/DL (ref 65–140)
GLUCOSE SERPL-MCNC: 341 MG/DL (ref 65–140)
HCT VFR BLD AUTO: 27.9 % (ref 36.5–49.3)
HGB BLD-MCNC: 8.9 G/DL (ref 12–17)
IMM GRANULOCYTES # BLD AUTO: 0.05 THOUSAND/UL (ref 0–0.2)
IMM GRANULOCYTES NFR BLD AUTO: 1 % (ref 0–2)
LYMPHOCYTES # BLD AUTO: 0.59 THOUSANDS/ΜL (ref 0.6–4.47)
LYMPHOCYTES NFR BLD AUTO: 7 % (ref 14–44)
MCH RBC QN AUTO: 31.4 PG (ref 26.8–34.3)
MCHC RBC AUTO-ENTMCNC: 31.9 G/DL (ref 31.4–37.4)
MCV RBC AUTO: 99 FL (ref 82–98)
MONOCYTES # BLD AUTO: 0.79 THOUSAND/ΜL (ref 0.17–1.22)
MONOCYTES NFR BLD AUTO: 9 % (ref 4–12)
NEUTROPHILS # BLD AUTO: 6.82 THOUSANDS/ΜL (ref 1.85–7.62)
NEUTS SEG NFR BLD AUTO: 81 % (ref 43–75)
NRBC BLD AUTO-RTO: 0 /100 WBCS
PLATELET # BLD AUTO: 173 THOUSANDS/UL (ref 149–390)
PMV BLD AUTO: 10.1 FL (ref 8.9–12.7)
POTASSIUM SERPL-SCNC: 4 MMOL/L (ref 3.5–5.3)
RBC # BLD AUTO: 2.83 MILLION/UL (ref 3.88–5.62)
SODIUM SERPL-SCNC: 137 MMOL/L (ref 136–145)
WBC # BLD AUTO: 8.47 THOUSAND/UL (ref 4.31–10.16)

## 2019-03-23 PROCEDURE — 82948 REAGENT STRIP/BLOOD GLUCOSE: CPT

## 2019-03-23 PROCEDURE — 80048 BASIC METABOLIC PNL TOTAL CA: CPT | Performed by: FAMILY MEDICINE

## 2019-03-23 PROCEDURE — 99232 SBSQ HOSP IP/OBS MODERATE 35: CPT | Performed by: FAMILY MEDICINE

## 2019-03-23 PROCEDURE — 85025 COMPLETE CBC W/AUTO DIFF WBC: CPT | Performed by: FAMILY MEDICINE

## 2019-03-23 RX ORDER — NYSTATIN 100000 [USP'U]/G
POWDER TOPICAL 2 TIMES DAILY
Status: DISCONTINUED | OUTPATIENT
Start: 2019-03-23 | End: 2019-03-27 | Stop reason: HOSPADM

## 2019-03-23 RX ADMIN — INSULIN LISPRO 4 UNITS: 100 INJECTION, SOLUTION INTRAVENOUS; SUBCUTANEOUS at 03:22

## 2019-03-23 RX ADMIN — ROPINIROLE 2 MG: 0.25 TABLET, FILM COATED ORAL at 20:50

## 2019-03-23 RX ADMIN — ATORVASTATIN CALCIUM 80 MG: 40 TABLET, FILM COATED ORAL at 17:55

## 2019-03-23 RX ADMIN — VANCOMYCIN HYDROCHLORIDE 1750 MG: 1 INJECTION, POWDER, LYOPHILIZED, FOR SOLUTION INTRAVENOUS at 14:57

## 2019-03-23 RX ADMIN — ACETAMINOPHEN 650 MG: 325 TABLET, FILM COATED ORAL at 03:05

## 2019-03-23 RX ADMIN — ENOXAPARIN SODIUM 40 MG: 40 INJECTION SUBCUTANEOUS at 09:09

## 2019-03-23 RX ADMIN — ASPIRIN 81 MG 81 MG: 81 TABLET ORAL at 17:55

## 2019-03-23 RX ADMIN — CARBIDOPA AND LEVODOPA 2 TABLET: 25; 100 TABLET ORAL at 20:55

## 2019-03-23 RX ADMIN — INSULIN LISPRO 5 UNITS: 100 INJECTION, SOLUTION INTRAVENOUS; SUBCUTANEOUS at 12:47

## 2019-03-23 RX ADMIN — CHLORHEXIDINE GLUCONATE 15 ML: 1.2 RINSE ORAL at 09:08

## 2019-03-23 RX ADMIN — CHLORHEXIDINE GLUCONATE 15 ML: 1.2 RINSE ORAL at 21:36

## 2019-03-23 RX ADMIN — CEFEPIME 2000 MG: 2 INJECTION, POWDER, FOR SOLUTION INTRAVENOUS at 21:30

## 2019-03-23 RX ADMIN — CARBIDOPA AND LEVODOPA 1 TABLET: 25; 100 TABLET ORAL at 17:55

## 2019-03-23 RX ADMIN — CARBIDOPA AND LEVODOPA 1 TABLET: 25; 100 TABLET ORAL at 12:47

## 2019-03-23 RX ADMIN — INSULIN LISPRO 4 UNITS: 100 INJECTION, SOLUTION INTRAVENOUS; SUBCUTANEOUS at 06:52

## 2019-03-23 RX ADMIN — CEFEPIME 2000 MG: 2 INJECTION, POWDER, FOR SOLUTION INTRAVENOUS at 09:08

## 2019-03-23 RX ADMIN — INSULIN LISPRO 5 UNITS: 100 INJECTION, SOLUTION INTRAVENOUS; SUBCUTANEOUS at 18:01

## 2019-03-23 RX ADMIN — NYSTATIN: 100000 POWDER TOPICAL at 21:00

## 2019-03-23 RX ADMIN — CARBIDOPA AND LEVODOPA 1 TABLET: 25; 100 TABLET ORAL at 09:09

## 2019-03-23 RX ADMIN — ACETAMINOPHEN 650 MG: 325 TABLET, FILM COATED ORAL at 20:55

## 2019-03-24 LAB
BACTERIA BLD CULT: ABNORMAL
GLUCOSE SERPL-MCNC: 335 MG/DL (ref 65–140)
GLUCOSE SERPL-MCNC: 371 MG/DL (ref 65–140)
GLUCOSE SERPL-MCNC: 372 MG/DL (ref 65–140)
GLUCOSE SERPL-MCNC: 418 MG/DL (ref 65–140)
GLUCOSE SERPL-MCNC: 424 MG/DL (ref 65–140)
GRAM STN SPEC: ABNORMAL

## 2019-03-24 PROCEDURE — 82948 REAGENT STRIP/BLOOD GLUCOSE: CPT

## 2019-03-24 PROCEDURE — 99232 SBSQ HOSP IP/OBS MODERATE 35: CPT | Performed by: FAMILY MEDICINE

## 2019-03-24 RX ADMIN — ENOXAPARIN SODIUM 40 MG: 40 INJECTION SUBCUTANEOUS at 08:50

## 2019-03-24 RX ADMIN — INSULIN LISPRO 10 UNITS: 100 INJECTION, SOLUTION INTRAVENOUS; SUBCUTANEOUS at 22:22

## 2019-03-24 RX ADMIN — CARBIDOPA AND LEVODOPA 1 TABLET: 25; 100 TABLET ORAL at 08:50

## 2019-03-24 RX ADMIN — CARBIDOPA AND LEVODOPA 2 TABLET: 25; 100 TABLET ORAL at 21:12

## 2019-03-24 RX ADMIN — INSULIN LISPRO 6 UNITS: 100 INJECTION, SOLUTION INTRAVENOUS; SUBCUTANEOUS at 12:32

## 2019-03-24 RX ADMIN — CARBIDOPA AND LEVODOPA 1 TABLET: 25; 100 TABLET ORAL at 13:33

## 2019-03-24 RX ADMIN — NYSTATIN: 100000 POWDER TOPICAL at 08:50

## 2019-03-24 RX ADMIN — ASPIRIN 81 MG 81 MG: 81 TABLET ORAL at 18:01

## 2019-03-24 RX ADMIN — CHLORHEXIDINE GLUCONATE 15 ML: 1.2 RINSE ORAL at 08:49

## 2019-03-24 RX ADMIN — INSULIN LISPRO 5 UNITS: 100 INJECTION, SOLUTION INTRAVENOUS; SUBCUTANEOUS at 00:41

## 2019-03-24 RX ADMIN — CEFEPIME 2000 MG: 2 INJECTION, POWDER, FOR SOLUTION INTRAVENOUS at 08:49

## 2019-03-24 RX ADMIN — NYSTATIN: 100000 POWDER TOPICAL at 18:01

## 2019-03-24 RX ADMIN — ATORVASTATIN CALCIUM 80 MG: 40 TABLET, FILM COATED ORAL at 18:01

## 2019-03-24 RX ADMIN — CHLORHEXIDINE GLUCONATE 15 ML: 1.2 RINSE ORAL at 21:11

## 2019-03-24 RX ADMIN — ROPINIROLE 2 MG: 0.25 TABLET, FILM COATED ORAL at 21:11

## 2019-03-24 RX ADMIN — INSULIN LISPRO 4 UNITS: 100 INJECTION, SOLUTION INTRAVENOUS; SUBCUTANEOUS at 18:33

## 2019-03-24 RX ADMIN — INSULIN LISPRO 6 UNITS: 100 INJECTION, SOLUTION INTRAVENOUS; SUBCUTANEOUS at 06:07

## 2019-03-24 RX ADMIN — INSULIN LISPRO 6 UNITS: 100 INJECTION, SOLUTION INTRAVENOUS; SUBCUTANEOUS at 18:11

## 2019-03-24 RX ADMIN — CEFEPIME 2000 MG: 2 INJECTION, POWDER, FOR SOLUTION INTRAVENOUS at 21:07

## 2019-03-24 RX ADMIN — CARBIDOPA AND LEVODOPA 1 TABLET: 25; 100 TABLET ORAL at 17:59

## 2019-03-25 LAB
ANION GAP SERPL CALCULATED.3IONS-SCNC: 10 MMOL/L (ref 4–13)
BUN SERPL-MCNC: 26 MG/DL (ref 5–25)
CALCIUM SERPL-MCNC: 8.8 MG/DL (ref 8.3–10.1)
CHLORIDE SERPL-SCNC: 101 MMOL/L (ref 100–108)
CO2 SERPL-SCNC: 25 MMOL/L (ref 21–32)
CREAT SERPL-MCNC: 1.15 MG/DL (ref 0.6–1.3)
GFR SERPL CREATININE-BSD FRML MDRD: 59 ML/MIN/1.73SQ M
GLUCOSE SERPL-MCNC: 322 MG/DL (ref 65–140)
GLUCOSE SERPL-MCNC: 323 MG/DL (ref 65–140)
GLUCOSE SERPL-MCNC: 411 MG/DL (ref 65–140)
GLUCOSE SERPL-MCNC: 416 MG/DL (ref 65–140)
GLUCOSE SERPL-MCNC: 418 MG/DL (ref 65–140)
GLUCOSE SERPL-MCNC: 434 MG/DL (ref 65–140)
POTASSIUM SERPL-SCNC: 4 MMOL/L (ref 3.5–5.3)
SODIUM SERPL-SCNC: 136 MMOL/L (ref 136–145)

## 2019-03-25 PROCEDURE — 99232 SBSQ HOSP IP/OBS MODERATE 35: CPT | Performed by: FAMILY MEDICINE

## 2019-03-25 PROCEDURE — 82948 REAGENT STRIP/BLOOD GLUCOSE: CPT

## 2019-03-25 PROCEDURE — 80048 BASIC METABOLIC PNL TOTAL CA: CPT | Performed by: INTERNAL MEDICINE

## 2019-03-25 RX ORDER — INSULIN GLARGINE 100 [IU]/ML
40 INJECTION, SOLUTION SUBCUTANEOUS
Status: DISCONTINUED | OUTPATIENT
Start: 2019-03-25 | End: 2019-03-26

## 2019-03-25 RX ADMIN — INSULIN GLARGINE 40 UNITS: 100 INJECTION, SOLUTION SUBCUTANEOUS at 22:35

## 2019-03-25 RX ADMIN — ENOXAPARIN SODIUM 40 MG: 40 INJECTION SUBCUTANEOUS at 09:19

## 2019-03-25 RX ADMIN — CARBIDOPA AND LEVODOPA 1 TABLET: 25; 100 TABLET ORAL at 16:23

## 2019-03-25 RX ADMIN — INSULIN LISPRO 8 UNITS: 100 INJECTION, SOLUTION INTRAVENOUS; SUBCUTANEOUS at 22:39

## 2019-03-25 RX ADMIN — CARBIDOPA AND LEVODOPA 2 TABLET: 25; 100 TABLET ORAL at 22:36

## 2019-03-25 RX ADMIN — ASPIRIN 81 MG 81 MG: 81 TABLET ORAL at 22:37

## 2019-03-25 RX ADMIN — ATORVASTATIN CALCIUM 80 MG: 40 TABLET, FILM COATED ORAL at 22:36

## 2019-03-25 RX ADMIN — NYSTATIN: 100000 POWDER TOPICAL at 09:19

## 2019-03-25 RX ADMIN — INSULIN LISPRO 12 UNITS: 100 INJECTION, SOLUTION INTRAVENOUS; SUBCUTANEOUS at 13:21

## 2019-03-25 RX ADMIN — ROPINIROLE 2 MG: 0.25 TABLET, FILM COATED ORAL at 22:35

## 2019-03-25 RX ADMIN — CARBIDOPA AND LEVODOPA 1 TABLET: 25; 100 TABLET ORAL at 09:18

## 2019-03-25 RX ADMIN — INSULIN LISPRO 12 UNITS: 100 INJECTION, SOLUTION INTRAVENOUS; SUBCUTANEOUS at 16:24

## 2019-03-25 RX ADMIN — INSULIN LISPRO 12 UNITS: 100 INJECTION, SOLUTION INTRAVENOUS; SUBCUTANEOUS at 07:30

## 2019-03-25 RX ADMIN — NYSTATIN: 100000 POWDER TOPICAL at 22:37

## 2019-03-25 RX ADMIN — CHLORHEXIDINE GLUCONATE 15 ML: 1.2 RINSE ORAL at 22:34

## 2019-03-25 RX ADMIN — CEFEPIME 2000 MG: 2 INJECTION, POWDER, FOR SOLUTION INTRAVENOUS at 22:34

## 2019-03-25 RX ADMIN — CARBIDOPA AND LEVODOPA 1 TABLET: 25; 100 TABLET ORAL at 13:21

## 2019-03-25 RX ADMIN — CHLORHEXIDINE GLUCONATE 15 ML: 1.2 RINSE ORAL at 09:19

## 2019-03-25 RX ADMIN — CEFEPIME 2000 MG: 2 INJECTION, POWDER, FOR SOLUTION INTRAVENOUS at 09:10

## 2019-03-26 LAB
BACTERIA BLD CULT: NORMAL
GLUCOSE SERPL-MCNC: 291 MG/DL (ref 65–140)
GLUCOSE SERPL-MCNC: 299 MG/DL (ref 65–140)
GLUCOSE SERPL-MCNC: 305 MG/DL (ref 65–140)
GLUCOSE SERPL-MCNC: 313 MG/DL (ref 65–140)

## 2019-03-26 PROCEDURE — 99232 SBSQ HOSP IP/OBS MODERATE 35: CPT | Performed by: STUDENT IN AN ORGANIZED HEALTH CARE EDUCATION/TRAINING PROGRAM

## 2019-03-26 PROCEDURE — 82948 REAGENT STRIP/BLOOD GLUCOSE: CPT

## 2019-03-26 RX ORDER — CIPROFLOXACIN 500 MG/1
500 TABLET, FILM COATED ORAL EVERY 12 HOURS SCHEDULED
Status: DISCONTINUED | OUTPATIENT
Start: 2019-03-26 | End: 2019-03-26

## 2019-03-26 RX ORDER — CIPROFLOXACIN 500 MG/1
500 TABLET, FILM COATED ORAL EVERY 12 HOURS SCHEDULED
Status: DISCONTINUED | OUTPATIENT
Start: 2019-03-26 | End: 2019-03-27 | Stop reason: HOSPADM

## 2019-03-26 RX ORDER — INSULIN GLARGINE 100 [IU]/ML
45 INJECTION, SOLUTION SUBCUTANEOUS
Status: DISCONTINUED | OUTPATIENT
Start: 2019-03-26 | End: 2019-03-27 | Stop reason: HOSPADM

## 2019-03-26 RX ADMIN — ATORVASTATIN CALCIUM 80 MG: 40 TABLET, FILM COATED ORAL at 17:26

## 2019-03-26 RX ADMIN — NYSTATIN: 100000 POWDER TOPICAL at 08:44

## 2019-03-26 RX ADMIN — ROPINIROLE 2 MG: 0.25 TABLET, FILM COATED ORAL at 22:13

## 2019-03-26 RX ADMIN — CHLORHEXIDINE GLUCONATE 15 ML: 1.2 RINSE ORAL at 22:12

## 2019-03-26 RX ADMIN — CARBIDOPA AND LEVODOPA 1 TABLET: 25; 100 TABLET ORAL at 08:45

## 2019-03-26 RX ADMIN — CARBIDOPA AND LEVODOPA 1 TABLET: 25; 100 TABLET ORAL at 15:07

## 2019-03-26 RX ADMIN — CEFEPIME 2000 MG: 2 INJECTION, POWDER, FOR SOLUTION INTRAVENOUS at 08:46

## 2019-03-26 RX ADMIN — INSULIN LISPRO 6 UNITS: 100 INJECTION, SOLUTION INTRAVENOUS; SUBCUTANEOUS at 22:15

## 2019-03-26 RX ADMIN — INSULIN GLARGINE 45 UNITS: 100 INJECTION, SOLUTION SUBCUTANEOUS at 22:14

## 2019-03-26 RX ADMIN — CHLORHEXIDINE GLUCONATE 15 ML: 1.2 RINSE ORAL at 08:44

## 2019-03-26 RX ADMIN — ENOXAPARIN SODIUM 40 MG: 40 INJECTION SUBCUTANEOUS at 08:48

## 2019-03-26 RX ADMIN — CARBIDOPA AND LEVODOPA 1 TABLET: 25; 100 TABLET ORAL at 17:26

## 2019-03-26 RX ADMIN — INSULIN LISPRO 8 UNITS: 100 INJECTION, SOLUTION INTRAVENOUS; SUBCUTANEOUS at 17:27

## 2019-03-26 RX ADMIN — ASPIRIN 81 MG 81 MG: 81 TABLET ORAL at 17:26

## 2019-03-26 RX ADMIN — INSULIN LISPRO 8 UNITS: 100 INJECTION, SOLUTION INTRAVENOUS; SUBCUTANEOUS at 11:55

## 2019-03-26 RX ADMIN — CIPROFLOXACIN HYDROCHLORIDE 500 MG: 500 TABLET, FILM COATED ORAL at 22:14

## 2019-03-26 RX ADMIN — INSULIN LISPRO 6 UNITS: 100 INJECTION, SOLUTION INTRAVENOUS; SUBCUTANEOUS at 08:43

## 2019-03-26 RX ADMIN — NYSTATIN: 100000 POWDER TOPICAL at 17:29

## 2019-03-26 RX ADMIN — CARBIDOPA AND LEVODOPA 2 TABLET: 25; 100 TABLET ORAL at 22:12

## 2019-03-27 VITALS
HEIGHT: 71 IN | SYSTOLIC BLOOD PRESSURE: 119 MMHG | DIASTOLIC BLOOD PRESSURE: 66 MMHG | WEIGHT: 184 LBS | RESPIRATION RATE: 21 BRPM | OXYGEN SATURATION: 97 % | HEART RATE: 63 BPM | BODY MASS INDEX: 25.76 KG/M2 | TEMPERATURE: 97.9 F

## 2019-03-27 PROBLEM — N20.0 NEPHROLITHIASIS: Status: ACTIVE | Noted: 2019-03-27

## 2019-03-27 LAB
ANION GAP SERPL CALCULATED.3IONS-SCNC: 8 MMOL/L (ref 4–13)
BASOPHILS # BLD AUTO: 0.03 THOUSANDS/ΜL (ref 0–0.1)
BASOPHILS NFR BLD AUTO: 0 % (ref 0–1)
BUN SERPL-MCNC: 36 MG/DL (ref 5–25)
CALCIUM SERPL-MCNC: 9.2 MG/DL (ref 8.3–10.1)
CHLORIDE SERPL-SCNC: 101 MMOL/L (ref 100–108)
CO2 SERPL-SCNC: 28 MMOL/L (ref 21–32)
CREAT SERPL-MCNC: 1.16 MG/DL (ref 0.6–1.3)
EOSINOPHIL # BLD AUTO: 0.24 THOUSAND/ΜL (ref 0–0.61)
EOSINOPHIL NFR BLD AUTO: 3 % (ref 0–6)
ERYTHROCYTE [DISTWIDTH] IN BLOOD BY AUTOMATED COUNT: 14.3 % (ref 11.6–15.1)
GFR SERPL CREATININE-BSD FRML MDRD: 59 ML/MIN/1.73SQ M
GLUCOSE SERPL-MCNC: 268 MG/DL (ref 65–140)
GLUCOSE SERPL-MCNC: 296 MG/DL (ref 65–140)
GLUCOSE SERPL-MCNC: 315 MG/DL (ref 65–140)
HCT VFR BLD AUTO: 31 % (ref 36.5–49.3)
HGB BLD-MCNC: 9.8 G/DL (ref 12–17)
IMM GRANULOCYTES # BLD AUTO: 0.11 THOUSAND/UL (ref 0–0.2)
IMM GRANULOCYTES NFR BLD AUTO: 1 % (ref 0–2)
LYMPHOCYTES # BLD AUTO: 1.47 THOUSANDS/ΜL (ref 0.6–4.47)
LYMPHOCYTES NFR BLD AUTO: 16 % (ref 14–44)
MCH RBC QN AUTO: 30.3 PG (ref 26.8–34.3)
MCHC RBC AUTO-ENTMCNC: 31.6 G/DL (ref 31.4–37.4)
MCV RBC AUTO: 96 FL (ref 82–98)
MONOCYTES # BLD AUTO: 0.98 THOUSAND/ΜL (ref 0.17–1.22)
MONOCYTES NFR BLD AUTO: 10 % (ref 4–12)
NEUTROPHILS # BLD AUTO: 6.67 THOUSANDS/ΜL (ref 1.85–7.62)
NEUTS SEG NFR BLD AUTO: 70 % (ref 43–75)
NRBC BLD AUTO-RTO: 0 /100 WBCS
PLATELET # BLD AUTO: 241 THOUSANDS/UL (ref 149–390)
PMV BLD AUTO: 10.7 FL (ref 8.9–12.7)
POTASSIUM SERPL-SCNC: 4.2 MMOL/L (ref 3.5–5.3)
RBC # BLD AUTO: 3.23 MILLION/UL (ref 3.88–5.62)
SODIUM SERPL-SCNC: 137 MMOL/L (ref 136–145)
WBC # BLD AUTO: 9.5 THOUSAND/UL (ref 4.31–10.16)

## 2019-03-27 PROCEDURE — 85025 COMPLETE CBC W/AUTO DIFF WBC: CPT | Performed by: STUDENT IN AN ORGANIZED HEALTH CARE EDUCATION/TRAINING PROGRAM

## 2019-03-27 PROCEDURE — 80048 BASIC METABOLIC PNL TOTAL CA: CPT | Performed by: STUDENT IN AN ORGANIZED HEALTH CARE EDUCATION/TRAINING PROGRAM

## 2019-03-27 PROCEDURE — 82948 REAGENT STRIP/BLOOD GLUCOSE: CPT

## 2019-03-27 PROCEDURE — 99239 HOSP IP/OBS DSCHRG MGMT >30: CPT | Performed by: STUDENT IN AN ORGANIZED HEALTH CARE EDUCATION/TRAINING PROGRAM

## 2019-03-27 RX ORDER — CEFDINIR 250 MG/5ML
6 POWDER, FOR SUSPENSION ORAL 2 TIMES DAILY
Qty: 96 ML | Refills: 0 | Status: SHIPPED | OUTPATIENT
Start: 2019-03-27 | End: 2019-03-27 | Stop reason: SDUPTHER

## 2019-03-27 RX ORDER — CEFDINIR 250 MG/5ML
6 POWDER, FOR SUSPENSION ORAL 2 TIMES DAILY
Qty: 96 ML | Refills: 0 | Status: SHIPPED | OUTPATIENT
Start: 2019-03-27 | End: 2019-04-04

## 2019-03-27 RX ORDER — CEFDINIR 250 MG/5ML
POWDER, FOR SUSPENSION ORAL 2 TIMES DAILY
Qty: 60 ML | Refills: 0 | Status: CANCELLED | OUTPATIENT
Start: 2019-03-27

## 2019-03-27 RX ORDER — LACTOBACILLUS ACIDOPH-L.BULGARICUS 1 MILLION CELL CHEWABLE TABLET 1MM CELL
1 TABLET,CHEWABLE ORAL
Qty: 24 TABLET | Refills: 0 | Status: SHIPPED | OUTPATIENT
Start: 2019-03-27 | End: 2019-04-04

## 2019-03-27 RX ADMIN — INSULIN LISPRO 8 UNITS: 100 INJECTION, SOLUTION INTRAVENOUS; SUBCUTANEOUS at 13:17

## 2019-03-27 RX ADMIN — CARBIDOPA AND LEVODOPA 1 TABLET: 25; 100 TABLET ORAL at 09:37

## 2019-03-27 RX ADMIN — INSULIN LISPRO 6 UNITS: 100 INJECTION, SOLUTION INTRAVENOUS; SUBCUTANEOUS at 09:44

## 2019-03-27 RX ADMIN — NYSTATIN: 100000 POWDER TOPICAL at 09:44

## 2019-03-27 RX ADMIN — CIPROFLOXACIN HYDROCHLORIDE 500 MG: 500 TABLET, FILM COATED ORAL at 09:37

## 2019-03-27 RX ADMIN — CHLORHEXIDINE GLUCONATE 15 ML: 1.2 RINSE ORAL at 09:38

## 2019-03-27 RX ADMIN — ENOXAPARIN SODIUM 40 MG: 40 INJECTION SUBCUTANEOUS at 09:38

## 2019-03-27 RX ADMIN — CARBIDOPA AND LEVODOPA 1 TABLET: 25; 100 TABLET ORAL at 13:16

## 2019-04-09 ENCOUNTER — APPOINTMENT (EMERGENCY)
Dept: CT IMAGING | Facility: HOSPITAL | Age: 79
DRG: 175 | End: 2019-04-09
Payer: COMMERCIAL

## 2019-04-09 ENCOUNTER — HOSPITAL ENCOUNTER (INPATIENT)
Facility: HOSPITAL | Age: 79
LOS: 4 days | Discharge: HOSPICE | DRG: 175 | End: 2019-04-13
Attending: EMERGENCY MEDICINE | Admitting: HOSPITALIST
Payer: COMMERCIAL

## 2019-04-09 DIAGNOSIS — G20 PARKINSON'S DISEASE WITH USE OF ELECTRICAL BRAIN STIMULATION (HCC): ICD-10-CM

## 2019-04-09 DIAGNOSIS — E11.9 TYPE 2 DIABETES MELLITUS WITHOUT COMPLICATION, WITH LONG-TERM CURRENT USE OF INSULIN (HCC): ICD-10-CM

## 2019-04-09 DIAGNOSIS — Z86.73 HISTORY OF CVA (CEREBROVASCULAR ACCIDENT): ICD-10-CM

## 2019-04-09 DIAGNOSIS — L89.159 SACRAL DECUBITUS ULCER: ICD-10-CM

## 2019-04-09 DIAGNOSIS — R53.1 GENERALIZED WEAKNESS: Primary | ICD-10-CM

## 2019-04-09 DIAGNOSIS — N39.0 UTI (URINARY TRACT INFECTION): ICD-10-CM

## 2019-04-09 DIAGNOSIS — R13.10 DYSPHAGIA, UNSPECIFIED TYPE: ICD-10-CM

## 2019-04-09 DIAGNOSIS — I26.99 PULMONARY EMBOLISM (HCC): ICD-10-CM

## 2019-04-09 DIAGNOSIS — E87.2 LACTIC ACIDOSIS: ICD-10-CM

## 2019-04-09 DIAGNOSIS — N13.2 HYDRONEPHROSIS WITH URINARY OBSTRUCTION DUE TO RENAL CALCULUS: ICD-10-CM

## 2019-04-09 DIAGNOSIS — Z79.4 TYPE 2 DIABETES MELLITUS WITHOUT COMPLICATION, WITH LONG-TERM CURRENT USE OF INSULIN (HCC): ICD-10-CM

## 2019-04-09 DIAGNOSIS — I26.99 OTHER ACUTE PULMONARY EMBOLISM WITHOUT ACUTE COR PULMONALE (HCC): ICD-10-CM

## 2019-04-09 LAB
ALBUMIN SERPL BCP-MCNC: 2.8 G/DL (ref 3.5–5)
ALP SERPL-CCNC: 126 U/L (ref 46–116)
ALT SERPL W P-5'-P-CCNC: 33 U/L (ref 12–78)
ANION GAP SERPL CALCULATED.3IONS-SCNC: 8 MMOL/L (ref 4–13)
APTT PPP: 27 SECONDS (ref 26–38)
AST SERPL W P-5'-P-CCNC: 29 U/L (ref 5–45)
BACTERIA UR QL AUTO: ABNORMAL /HPF
BASOPHILS # BLD AUTO: 0.02 THOUSANDS/ΜL (ref 0–0.1)
BASOPHILS NFR BLD AUTO: 0 % (ref 0–1)
BILIRUB SERPL-MCNC: 1.2 MG/DL (ref 0.2–1)
BILIRUB UR QL STRIP: NEGATIVE
BUN SERPL-MCNC: 31 MG/DL (ref 5–25)
CALCIUM SERPL-MCNC: 9.4 MG/DL (ref 8.3–10.1)
CHLORIDE SERPL-SCNC: 95 MMOL/L (ref 100–108)
CLARITY UR: ABNORMAL
CO2 SERPL-SCNC: 30 MMOL/L (ref 21–32)
COLOR UR: YELLOW
CREAT SERPL-MCNC: 1.26 MG/DL (ref 0.6–1.3)
EOSINOPHIL # BLD AUTO: 0.1 THOUSAND/ΜL (ref 0–0.61)
EOSINOPHIL NFR BLD AUTO: 1 % (ref 0–6)
ERYTHROCYTE [DISTWIDTH] IN BLOOD BY AUTOMATED COUNT: 14.4 % (ref 11.6–15.1)
GFR SERPL CREATININE-BSD FRML MDRD: 53 ML/MIN/1.73SQ M
GLUCOSE SERPL-MCNC: 166 MG/DL (ref 65–140)
GLUCOSE UR STRIP-MCNC: NEGATIVE MG/DL
HCT VFR BLD AUTO: 40.8 % (ref 36.5–49.3)
HGB BLD-MCNC: 13.2 G/DL (ref 12–17)
HGB UR QL STRIP.AUTO: ABNORMAL
IMM GRANULOCYTES # BLD AUTO: 0.09 THOUSAND/UL (ref 0–0.2)
IMM GRANULOCYTES NFR BLD AUTO: 1 % (ref 0–2)
INR PPP: 1.07 (ref 0.86–1.17)
KETONES UR STRIP-MCNC: NEGATIVE MG/DL
LACTATE SERPL-SCNC: 1.7 MMOL/L (ref 0.5–2)
LACTATE SERPL-SCNC: 4.2 MMOL/L (ref 0.5–2)
LEUKOCYTE ESTERASE UR QL STRIP: ABNORMAL
LYMPHOCYTES # BLD AUTO: 1.67 THOUSANDS/ΜL (ref 0.6–4.47)
LYMPHOCYTES NFR BLD AUTO: 20 % (ref 14–44)
MCH RBC QN AUTO: 30.8 PG (ref 26.8–34.3)
MCHC RBC AUTO-ENTMCNC: 32.4 G/DL (ref 31.4–37.4)
MCV RBC AUTO: 95 FL (ref 82–98)
MONOCYTES # BLD AUTO: 0.83 THOUSAND/ΜL (ref 0.17–1.22)
MONOCYTES NFR BLD AUTO: 10 % (ref 4–12)
NEUTROPHILS # BLD AUTO: 5.53 THOUSANDS/ΜL (ref 1.85–7.62)
NEUTS SEG NFR BLD AUTO: 68 % (ref 43–75)
NITRITE UR QL STRIP: NEGATIVE
NON-SQ EPI CELLS URNS QL MICRO: ABNORMAL /HPF
NRBC BLD AUTO-RTO: 0 /100 WBCS
OTHER STN SPEC: ABNORMAL
PH UR STRIP.AUTO: 7 [PH]
PLATELET # BLD AUTO: 283 THOUSANDS/UL (ref 149–390)
PMV BLD AUTO: 9.6 FL (ref 8.9–12.7)
POTASSIUM SERPL-SCNC: 3.6 MMOL/L (ref 3.5–5.3)
PROT SERPL-MCNC: 7.8 G/DL (ref 6.4–8.2)
PROT UR STRIP-MCNC: ABNORMAL MG/DL
PROTHROMBIN TIME: 13.8 SECONDS (ref 11.8–14.2)
RBC # BLD AUTO: 4.28 MILLION/UL (ref 3.88–5.62)
RBC #/AREA URNS AUTO: ABNORMAL /HPF
SODIUM SERPL-SCNC: 133 MMOL/L (ref 136–145)
SP GR UR STRIP.AUTO: 1.01 (ref 1–1.03)
UROBILINOGEN UR QL STRIP.AUTO: 0.2 E.U./DL
WBC # BLD AUTO: 8.24 THOUSAND/UL (ref 4.31–10.16)
WBC #/AREA URNS AUTO: ABNORMAL /HPF

## 2019-04-09 PROCEDURE — 87086 URINE CULTURE/COLONY COUNT: CPT | Performed by: EMERGENCY MEDICINE

## 2019-04-09 PROCEDURE — 74177 CT ABD & PELVIS W/CONTRAST: CPT

## 2019-04-09 PROCEDURE — 85610 PROTHROMBIN TIME: CPT

## 2019-04-09 PROCEDURE — 85025 COMPLETE CBC W/AUTO DIFF WBC: CPT

## 2019-04-09 PROCEDURE — 93005 ELECTROCARDIOGRAM TRACING: CPT

## 2019-04-09 PROCEDURE — 36415 COLL VENOUS BLD VENIPUNCTURE: CPT

## 2019-04-09 PROCEDURE — 96360 HYDRATION IV INFUSION INIT: CPT

## 2019-04-09 PROCEDURE — 81001 URINALYSIS AUTO W/SCOPE: CPT | Performed by: EMERGENCY MEDICINE

## 2019-04-09 PROCEDURE — 87040 BLOOD CULTURE FOR BACTERIA: CPT

## 2019-04-09 PROCEDURE — 71260 CT THORAX DX C+: CPT

## 2019-04-09 PROCEDURE — 83605 ASSAY OF LACTIC ACID: CPT

## 2019-04-09 PROCEDURE — 99285 EMERGENCY DEPT VISIT HI MDM: CPT

## 2019-04-09 PROCEDURE — 87186 SC STD MICRODIL/AGAR DIL: CPT | Performed by: EMERGENCY MEDICINE

## 2019-04-09 PROCEDURE — 99285 EMERGENCY DEPT VISIT HI MDM: CPT | Performed by: EMERGENCY MEDICINE

## 2019-04-09 PROCEDURE — 85730 THROMBOPLASTIN TIME PARTIAL: CPT

## 2019-04-09 PROCEDURE — 87077 CULTURE AEROBIC IDENTIFY: CPT | Performed by: EMERGENCY MEDICINE

## 2019-04-09 PROCEDURE — 80053 COMPREHEN METABOLIC PANEL: CPT

## 2019-04-09 RX ORDER — HEPARIN SODIUM 1000 [USP'U]/ML
6000 INJECTION, SOLUTION INTRAVENOUS; SUBCUTANEOUS ONCE
Status: COMPLETED | OUTPATIENT
Start: 2019-04-09 | End: 2019-04-09

## 2019-04-09 RX ORDER — HEPARIN SODIUM 1000 [USP'U]/ML
3000 INJECTION, SOLUTION INTRAVENOUS; SUBCUTANEOUS AS NEEDED
Status: DISCONTINUED | OUTPATIENT
Start: 2019-04-09 | End: 2019-04-12

## 2019-04-09 RX ORDER — HEPARIN SODIUM 10000 [USP'U]/100ML
3-30 INJECTION, SOLUTION INTRAVENOUS
Status: DISCONTINUED | OUTPATIENT
Start: 2019-04-09 | End: 2019-04-12

## 2019-04-09 RX ORDER — HEPARIN SODIUM 1000 [USP'U]/ML
6000 INJECTION, SOLUTION INTRAVENOUS; SUBCUTANEOUS AS NEEDED
Status: DISCONTINUED | OUTPATIENT
Start: 2019-04-09 | End: 2019-04-12

## 2019-04-09 RX ADMIN — HEPARIN SODIUM AND DEXTROSE 18 UNITS/KG/HR: 10000; 5 INJECTION INTRAVENOUS at 23:14

## 2019-04-09 RX ADMIN — IOHEXOL 100 ML: 350 INJECTION, SOLUTION INTRAVENOUS at 22:07

## 2019-04-09 RX ADMIN — SODIUM CHLORIDE 2000 ML: 0.9 INJECTION, SOLUTION INTRAVENOUS at 21:56

## 2019-04-09 RX ADMIN — HEPARIN SODIUM 6000 UNITS: 1000 INJECTION, SOLUTION INTRAVENOUS; SUBCUTANEOUS at 23:15

## 2019-04-10 ENCOUNTER — APPOINTMENT (INPATIENT)
Dept: NON INVASIVE DIAGNOSTICS | Facility: HOSPITAL | Age: 79
DRG: 175 | End: 2019-04-10
Payer: COMMERCIAL

## 2019-04-10 ENCOUNTER — APPOINTMENT (INPATIENT)
Dept: ULTRASOUND IMAGING | Facility: HOSPITAL | Age: 79
DRG: 175 | End: 2019-04-10
Payer: COMMERCIAL

## 2019-04-10 PROBLEM — N39.0 UTI (URINARY TRACT INFECTION): Status: ACTIVE | Noted: 2019-04-10

## 2019-04-10 PROBLEM — I26.99 ACUTE PULMONARY EMBOLISM (HCC): Status: ACTIVE | Noted: 2019-04-10

## 2019-04-10 LAB
ANION GAP SERPL CALCULATED.3IONS-SCNC: 4 MMOL/L (ref 4–13)
APTT PPP: 109 SECONDS (ref 26–38)
APTT PPP: 199 SECONDS (ref 26–38)
APTT PPP: 86 SECONDS (ref 26–38)
ATRIAL RATE: 80 BPM
BASOPHILS # BLD AUTO: 0.01 THOUSANDS/ΜL (ref 0–0.1)
BASOPHILS NFR BLD AUTO: 0 % (ref 0–1)
BUN SERPL-MCNC: 25 MG/DL (ref 5–25)
CALCIUM SERPL-MCNC: 8.5 MG/DL (ref 8.3–10.1)
CHLORIDE SERPL-SCNC: 103 MMOL/L (ref 100–108)
CO2 SERPL-SCNC: 28 MMOL/L (ref 21–32)
CREAT SERPL-MCNC: 1.04 MG/DL (ref 0.6–1.3)
EOSINOPHIL # BLD AUTO: 0.1 THOUSAND/ΜL (ref 0–0.61)
EOSINOPHIL NFR BLD AUTO: 1 % (ref 0–6)
ERYTHROCYTE [DISTWIDTH] IN BLOOD BY AUTOMATED COUNT: 14.6 % (ref 11.6–15.1)
GFR SERPL CREATININE-BSD FRML MDRD: 67 ML/MIN/1.73SQ M
GLUCOSE SERPL-MCNC: 120 MG/DL (ref 65–140)
GLUCOSE SERPL-MCNC: 139 MG/DL (ref 65–140)
GLUCOSE SERPL-MCNC: 172 MG/DL (ref 65–140)
GLUCOSE SERPL-MCNC: 196 MG/DL (ref 65–140)
GLUCOSE SERPL-MCNC: 281 MG/DL (ref 65–140)
HCT VFR BLD AUTO: 33.9 % (ref 36.5–49.3)
HGB BLD-MCNC: 10.7 G/DL (ref 12–17)
IMM GRANULOCYTES # BLD AUTO: 0.07 THOUSAND/UL (ref 0–0.2)
IMM GRANULOCYTES NFR BLD AUTO: 1 % (ref 0–2)
LYMPHOCYTES # BLD AUTO: 1.51 THOUSANDS/ΜL (ref 0.6–4.47)
LYMPHOCYTES NFR BLD AUTO: 20 % (ref 14–44)
MCH RBC QN AUTO: 30.4 PG (ref 26.8–34.3)
MCHC RBC AUTO-ENTMCNC: 31.6 G/DL (ref 31.4–37.4)
MCV RBC AUTO: 96 FL (ref 82–98)
MONOCYTES # BLD AUTO: 0.67 THOUSAND/ΜL (ref 0.17–1.22)
MONOCYTES NFR BLD AUTO: 9 % (ref 4–12)
NEUTROPHILS # BLD AUTO: 5.29 THOUSANDS/ΜL (ref 1.85–7.62)
NEUTS SEG NFR BLD AUTO: 69 % (ref 43–75)
NRBC BLD AUTO-RTO: 0 /100 WBCS
P AXIS: 7 DEGREES
PLATELET # BLD AUTO: 208 THOUSANDS/UL (ref 149–390)
PMV BLD AUTO: 9.3 FL (ref 8.9–12.7)
POTASSIUM SERPL-SCNC: 3.5 MMOL/L (ref 3.5–5.3)
PR INTERVAL: 164 MS
QRS AXIS: -79 DEGREES
QRSD INTERVAL: 126 MS
QT INTERVAL: 388 MS
QTC INTERVAL: 447 MS
RBC # BLD AUTO: 3.52 MILLION/UL (ref 3.88–5.62)
SODIUM SERPL-SCNC: 135 MMOL/L (ref 136–145)
T WAVE AXIS: 5 DEGREES
VENTRICULAR RATE: 80 BPM
WBC # BLD AUTO: 7.65 THOUSAND/UL (ref 4.31–10.16)

## 2019-04-10 PROCEDURE — 85730 THROMBOPLASTIN TIME PARTIAL: CPT | Performed by: PHYSICIAN ASSISTANT

## 2019-04-10 PROCEDURE — 93010 ELECTROCARDIOGRAM REPORT: CPT | Performed by: INTERNAL MEDICINE

## 2019-04-10 PROCEDURE — 93970 EXTREMITY STUDY: CPT

## 2019-04-10 PROCEDURE — 93970 EXTREMITY STUDY: CPT | Performed by: SURGERY

## 2019-04-10 PROCEDURE — 36415 COLL VENOUS BLD VENIPUNCTURE: CPT | Performed by: HOSPITALIST

## 2019-04-10 PROCEDURE — 82948 REAGENT STRIP/BLOOD GLUCOSE: CPT

## 2019-04-10 PROCEDURE — 99223 1ST HOSP IP/OBS HIGH 75: CPT | Performed by: HOSPITALIST

## 2019-04-10 PROCEDURE — C8929 TTE W OR WO FOL WCON,DOPPLER: HCPCS

## 2019-04-10 PROCEDURE — 80048 BASIC METABOLIC PNL TOTAL CA: CPT | Performed by: HOSPITALIST

## 2019-04-10 PROCEDURE — 85025 COMPLETE CBC W/AUTO DIFF WBC: CPT | Performed by: HOSPITALIST

## 2019-04-10 PROCEDURE — 85730 THROMBOPLASTIN TIME PARTIAL: CPT | Performed by: HOSPITALIST

## 2019-04-10 PROCEDURE — 93306 TTE W/DOPPLER COMPLETE: CPT | Performed by: INTERNAL MEDICINE

## 2019-04-10 RX ORDER — ROPINIROLE 1 MG/1
1 TABLET, FILM COATED ORAL
Status: DISCONTINUED | OUTPATIENT
Start: 2019-04-10 | End: 2019-04-10

## 2019-04-10 RX ORDER — BISACODYL 10 MG
10 SUPPOSITORY, RECTAL RECTAL ONCE
Status: COMPLETED | OUTPATIENT
Start: 2019-04-10 | End: 2019-04-10

## 2019-04-10 RX ORDER — ASPIRIN 81 MG/1
81 TABLET, CHEWABLE ORAL EVERY EVENING
Status: DISCONTINUED | OUTPATIENT
Start: 2019-04-10 | End: 2019-04-13 | Stop reason: HOSPADM

## 2019-04-10 RX ORDER — FLUCONAZOLE 40 MG/ML
200 POWDER, FOR SUSPENSION ORAL DAILY
Status: DISCONTINUED | OUTPATIENT
Start: 2019-04-10 | End: 2019-04-10

## 2019-04-10 RX ORDER — ATORVASTATIN CALCIUM 40 MG/1
80 TABLET, FILM COATED ORAL EVERY EVENING
Status: DISCONTINUED | OUTPATIENT
Start: 2019-04-10 | End: 2019-04-13 | Stop reason: HOSPADM

## 2019-04-10 RX ORDER — CHOLECALCIFEROL (VITAMIN D3) 125 MCG
250 CAPSULE ORAL DAILY
Status: DISCONTINUED | OUTPATIENT
Start: 2019-04-10 | End: 2019-04-13 | Stop reason: HOSPADM

## 2019-04-10 RX ORDER — ROPINIROLE 1 MG/1
1 TABLET, FILM COATED ORAL
Status: DISCONTINUED | OUTPATIENT
Start: 2019-04-10 | End: 2019-04-13 | Stop reason: HOSPADM

## 2019-04-10 RX ORDER — CHLORHEXIDINE GLUCONATE 0.12 MG/ML
15 RINSE ORAL EVERY 12 HOURS SCHEDULED
Status: DISCONTINUED | OUTPATIENT
Start: 2019-04-10 | End: 2019-04-13 | Stop reason: HOSPADM

## 2019-04-10 RX ORDER — CARBIDOPA AND LEVODOPA 50; 200 MG/1; MG/1
1 TABLET, EXTENDED RELEASE ORAL
Status: DISCONTINUED | OUTPATIENT
Start: 2019-04-10 | End: 2019-04-11

## 2019-04-10 RX ORDER — POLYETHYLENE GLYCOL 3350 17 G/17G
17 POWDER, FOR SOLUTION ORAL DAILY
Status: DISCONTINUED | OUTPATIENT
Start: 2019-04-10 | End: 2019-04-13 | Stop reason: HOSPADM

## 2019-04-10 RX ORDER — FLUCONAZOLE 40 MG/ML
200 POWDER, FOR SUSPENSION ORAL DAILY
Status: DISCONTINUED | OUTPATIENT
Start: 2019-04-11 | End: 2019-04-12

## 2019-04-10 RX ORDER — INSULIN GLARGINE 100 [IU]/ML
20 INJECTION, SOLUTION SUBCUTANEOUS
Status: DISCONTINUED | OUTPATIENT
Start: 2019-04-10 | End: 2019-04-10

## 2019-04-10 RX ORDER — INSULIN GLARGINE 100 [IU]/ML
40 INJECTION, SOLUTION SUBCUTANEOUS
Status: DISCONTINUED | OUTPATIENT
Start: 2019-04-10 | End: 2019-04-12

## 2019-04-10 RX ORDER — SODIUM CHLORIDE 9 MG/ML
75 INJECTION, SOLUTION INTRAVENOUS CONTINUOUS
Status: DISPENSED | OUTPATIENT
Start: 2019-04-10 | End: 2019-04-11

## 2019-04-10 RX ADMIN — SODIUM CHLORIDE 75 ML/HR: 0.9 INJECTION, SOLUTION INTRAVENOUS at 01:18

## 2019-04-10 RX ADMIN — INSULIN LISPRO 2 UNITS: 100 INJECTION, SOLUTION INTRAVENOUS; SUBCUTANEOUS at 16:56

## 2019-04-10 RX ADMIN — CEFEPIME 2000 MG: 2 INJECTION, POWDER, FOR SOLUTION INTRAVENOUS at 00:00

## 2019-04-10 RX ADMIN — Medication 10 MG: at 16:50

## 2019-04-10 RX ADMIN — PERFLUTREN 0.6 ML/MIN: 6.52 INJECTION, SUSPENSION INTRAVENOUS at 10:12

## 2019-04-10 RX ADMIN — CARBIDOPA AND LEVODOPA 1 TABLET: 25; 100 TABLET ORAL at 16:58

## 2019-04-10 RX ADMIN — ASPIRIN 81 MG 81 MG: 81 TABLET ORAL at 17:00

## 2019-04-10 RX ADMIN — POLYETHYLENE GLYCOL 3350 17 G: 17 POWDER, FOR SOLUTION ORAL at 16:50

## 2019-04-10 RX ADMIN — ATORVASTATIN CALCIUM 80 MG: 40 TABLET, FILM COATED ORAL at 17:00

## 2019-04-10 RX ADMIN — ROPINIROLE HYDROCHLORIDE 1 MG: 1 TABLET, FILM COATED ORAL at 02:11

## 2019-04-10 RX ADMIN — ATORVASTATIN CALCIUM 80 MG: 40 TABLET, FILM COATED ORAL at 02:12

## 2019-04-10 RX ADMIN — CARBIDOPA AND LEVODOPA 1 TABLET: 25; 100 TABLET ORAL at 10:00

## 2019-04-10 RX ADMIN — CHLORHEXIDINE GLUCONATE 0.12% ORAL RINSE 15 ML: 1.2 LIQUID ORAL at 22:32

## 2019-04-10 RX ADMIN — CARBIDOPA AND LEVODOPA 1 TABLET: 50; 200 TABLET, EXTENDED RELEASE ORAL at 22:33

## 2019-04-10 RX ADMIN — CYANOCOBALAMIN TAB 500 MCG 250 MCG: 500 TAB at 10:00

## 2019-04-10 RX ADMIN — Medication 1000 MG: at 11:47

## 2019-04-10 RX ADMIN — INSULIN GLARGINE 40 UNITS: 100 INJECTION, SOLUTION SUBCUTANEOUS at 22:32

## 2019-04-10 RX ADMIN — FLUCONAZOLE 200 MG: 40 POWDER, FOR SUSPENSION ORAL at 13:29

## 2019-04-10 RX ADMIN — ROPINIROLE 1 MG: 1 TABLET, FILM COATED ORAL at 22:32

## 2019-04-10 RX ADMIN — INSULIN LISPRO 1 UNITS: 100 INJECTION, SOLUTION INTRAVENOUS; SUBCUTANEOUS at 11:14

## 2019-04-10 RX ADMIN — CHLORHEXIDINE GLUCONATE 0.12% ORAL RINSE 15 ML: 1.2 LIQUID ORAL at 10:00

## 2019-04-10 RX ADMIN — ASPIRIN 81 MG 81 MG: 81 TABLET ORAL at 02:11

## 2019-04-10 RX ADMIN — HEPARIN SODIUM AND DEXTROSE 13 UNITS/KG/HR: 10000; 5 INJECTION INTRAVENOUS at 23:56

## 2019-04-10 RX ADMIN — CARBIDOPA AND LEVODOPA 1 TABLET: 25; 100 TABLET ORAL at 22:32

## 2019-04-10 RX ADMIN — CARBIDOPA AND LEVODOPA 1 TABLET: 25; 100 TABLET ORAL at 05:00

## 2019-04-10 RX ADMIN — CHLORHEXIDINE GLUCONATE 0.12% ORAL RINSE 15 ML: 1.2 LIQUID ORAL at 02:11

## 2019-04-11 LAB
APTT PPP: 77 SECONDS (ref 26–38)
APTT PPP: 85 SECONDS (ref 26–38)
GLUCOSE SERPL-MCNC: 261 MG/DL (ref 65–140)
GLUCOSE SERPL-MCNC: 283 MG/DL (ref 65–140)
GLUCOSE SERPL-MCNC: 304 MG/DL (ref 65–140)
GLUCOSE SERPL-MCNC: 323 MG/DL (ref 65–140)
INR PPP: 1.11 (ref 0.86–1.17)
PROTHROMBIN TIME: 14.2 SECONDS (ref 11.8–14.2)

## 2019-04-11 PROCEDURE — 99222 1ST HOSP IP/OBS MODERATE 55: CPT | Performed by: UROLOGY

## 2019-04-11 PROCEDURE — 99222 1ST HOSP IP/OBS MODERATE 55: CPT | Performed by: INTERNAL MEDICINE

## 2019-04-11 PROCEDURE — 85730 THROMBOPLASTIN TIME PARTIAL: CPT | Performed by: HOSPITALIST

## 2019-04-11 PROCEDURE — 82948 REAGENT STRIP/BLOOD GLUCOSE: CPT

## 2019-04-11 PROCEDURE — 99232 SBSQ HOSP IP/OBS MODERATE 35: CPT | Performed by: PHYSICIAN ASSISTANT

## 2019-04-11 PROCEDURE — 85610 PROTHROMBIN TIME: CPT | Performed by: PHYSICIAN ASSISTANT

## 2019-04-11 RX ORDER — WARFARIN SODIUM 5 MG/1
5 TABLET ORAL
Status: DISCONTINUED | OUTPATIENT
Start: 2019-04-11 | End: 2019-04-13 | Stop reason: HOSPADM

## 2019-04-11 RX ORDER — CARBIDOPA AND LEVODOPA 25; 100 MG/1; MG/1
1 TABLET, EXTENDED RELEASE ORAL
Status: DISCONTINUED | OUTPATIENT
Start: 2019-04-11 | End: 2019-04-11

## 2019-04-11 RX ORDER — CARBIDOPA AND LEVODOPA 50; 200 MG/1; MG/1
1 TABLET, EXTENDED RELEASE ORAL 4 TIMES DAILY
Status: DISCONTINUED | OUTPATIENT
Start: 2019-04-11 | End: 2019-04-11

## 2019-04-11 RX ADMIN — CARBIDOPA AND LEVODOPA 2 TABLET: 25; 100 TABLET ORAL at 15:41

## 2019-04-11 RX ADMIN — CHLORHEXIDINE GLUCONATE 0.12% ORAL RINSE 15 ML: 1.2 LIQUID ORAL at 22:49

## 2019-04-11 RX ADMIN — Medication 1000 MG: at 15:41

## 2019-04-11 RX ADMIN — WARFARIN SODIUM 5 MG: 5 TABLET ORAL at 17:15

## 2019-04-11 RX ADMIN — CYANOCOBALAMIN TAB 500 MCG 250 MCG: 500 TAB at 08:27

## 2019-04-11 RX ADMIN — ROPINIROLE 1 MG: 1 TABLET, FILM COATED ORAL at 22:50

## 2019-04-11 RX ADMIN — CHLORHEXIDINE GLUCONATE 0.12% ORAL RINSE 15 ML: 1.2 LIQUID ORAL at 08:28

## 2019-04-11 RX ADMIN — POLYETHYLENE GLYCOL 3350 17 G: 17 POWDER, FOR SOLUTION ORAL at 08:28

## 2019-04-11 RX ADMIN — INSULIN LISPRO 2 UNITS: 100 INJECTION, SOLUTION INTRAVENOUS; SUBCUTANEOUS at 08:29

## 2019-04-11 RX ADMIN — Medication 1000 MG: at 00:05

## 2019-04-11 RX ADMIN — CARBIDOPA AND LEVODOPA 2 TABLET: 25; 100 TABLET ORAL at 22:50

## 2019-04-11 RX ADMIN — ASPIRIN 81 MG 81 MG: 81 TABLET ORAL at 17:15

## 2019-04-11 RX ADMIN — INSULIN GLARGINE 40 UNITS: 100 INJECTION, SOLUTION SUBCUTANEOUS at 22:56

## 2019-04-11 RX ADMIN — ATORVASTATIN CALCIUM 80 MG: 40 TABLET, FILM COATED ORAL at 17:15

## 2019-04-11 RX ADMIN — CARBIDOPA AND LEVODOPA 1 TABLET: 25; 100 TABLET ORAL at 08:28

## 2019-04-11 RX ADMIN — FLUCONAZOLE 200 MG: 40 POWDER, FOR SUSPENSION ORAL at 08:28

## 2019-04-11 RX ADMIN — INSULIN LISPRO 3 UNITS: 100 INJECTION, SOLUTION INTRAVENOUS; SUBCUTANEOUS at 13:52

## 2019-04-11 RX ADMIN — INSULIN LISPRO 3 UNITS: 100 INJECTION, SOLUTION INTRAVENOUS; SUBCUTANEOUS at 18:26

## 2019-04-11 RX ADMIN — Medication 1000 MG: at 23:57

## 2019-04-12 ENCOUNTER — TELEPHONE (OUTPATIENT)
Dept: UROLOGY | Facility: CLINIC | Age: 79
End: 2019-04-12

## 2019-04-12 DIAGNOSIS — N20.0 NEPHROLITHIASIS: Primary | ICD-10-CM

## 2019-04-12 LAB
APTT PPP: 68 SECONDS (ref 26–38)
BACTERIA UR CULT: ABNORMAL
GLUCOSE SERPL-MCNC: 229 MG/DL (ref 65–140)
GLUCOSE SERPL-MCNC: 261 MG/DL (ref 65–140)
GLUCOSE SERPL-MCNC: 276 MG/DL (ref 65–140)
GLUCOSE SERPL-MCNC: 296 MG/DL (ref 65–140)
GLUCOSE SERPL-MCNC: 319 MG/DL (ref 65–140)
INR PPP: 1.13 (ref 0.86–1.17)
PROTHROMBIN TIME: 14.4 SECONDS (ref 11.8–14.2)

## 2019-04-12 PROCEDURE — 99232 SBSQ HOSP IP/OBS MODERATE 35: CPT | Performed by: PHYSICIAN ASSISTANT

## 2019-04-12 PROCEDURE — 82948 REAGENT STRIP/BLOOD GLUCOSE: CPT

## 2019-04-12 PROCEDURE — 85730 THROMBOPLASTIN TIME PARTIAL: CPT | Performed by: INTERNAL MEDICINE

## 2019-04-12 PROCEDURE — 85610 PROTHROMBIN TIME: CPT | Performed by: PHYSICIAN ASSISTANT

## 2019-04-12 PROCEDURE — 99232 SBSQ HOSP IP/OBS MODERATE 35: CPT | Performed by: INTERNAL MEDICINE

## 2019-04-12 RX ORDER — HALOPERIDOL 1 MG/1
1 TABLET ORAL EVERY 4 HOURS PRN
Qty: 30 TABLET | Refills: 0 | Status: SHIPPED | OUTPATIENT
Start: 2019-04-12

## 2019-04-12 RX ORDER — MORPHINE SULFATE 100 MG/5ML
5 SOLUTION ORAL EVERY 2 HOUR PRN
Qty: 30 ML | Refills: 0 | Status: SHIPPED | OUTPATIENT
Start: 2019-04-12 | End: 2019-04-22

## 2019-04-12 RX ORDER — LORAZEPAM 0.5 MG/1
0.5 TABLET ORAL EVERY 4 HOURS PRN
Qty: 30 TABLET | Refills: 0 | Status: SHIPPED | OUTPATIENT
Start: 2019-04-12 | End: 2019-04-22

## 2019-04-12 RX ORDER — INSULIN GLARGINE 100 [IU]/ML
48 INJECTION, SOLUTION SUBCUTANEOUS
Status: DISCONTINUED | OUTPATIENT
Start: 2019-04-12 | End: 2019-04-13 | Stop reason: HOSPADM

## 2019-04-12 RX ADMIN — INSULIN LISPRO 2 UNITS: 100 INJECTION, SOLUTION INTRAVENOUS; SUBCUTANEOUS at 13:14

## 2019-04-12 RX ADMIN — INSULIN LISPRO 3 UNITS: 100 INJECTION, SOLUTION INTRAVENOUS; SUBCUTANEOUS at 10:07

## 2019-04-12 RX ADMIN — ENOXAPARIN SODIUM 120 MG: 120 INJECTION SUBCUTANEOUS at 13:10

## 2019-04-12 RX ADMIN — CARBIDOPA AND LEVODOPA 2 TABLET: 25; 100 TABLET ORAL at 13:11

## 2019-04-12 RX ADMIN — HEPARIN SODIUM AND DEXTROSE 13.07 UNITS/KG/HR: 10000; 5 INJECTION INTRAVENOUS at 00:03

## 2019-04-12 RX ADMIN — ASPIRIN 81 MG 81 MG: 81 TABLET ORAL at 18:26

## 2019-04-12 RX ADMIN — CARBIDOPA AND LEVODOPA 2 TABLET: 25; 100 TABLET ORAL at 18:26

## 2019-04-12 RX ADMIN — POLYETHYLENE GLYCOL 3350 17 G: 17 POWDER, FOR SOLUTION ORAL at 10:08

## 2019-04-12 RX ADMIN — INSULIN LISPRO 2 UNITS: 100 INJECTION, SOLUTION INTRAVENOUS; SUBCUTANEOUS at 18:25

## 2019-04-12 RX ADMIN — CYANOCOBALAMIN TAB 500 MCG 250 MCG: 500 TAB at 10:06

## 2019-04-12 RX ADMIN — ROPINIROLE 1 MG: 1 TABLET, FILM COATED ORAL at 21:53

## 2019-04-12 RX ADMIN — CARBIDOPA AND LEVODOPA 2 TABLET: 25; 100 TABLET ORAL at 10:04

## 2019-04-12 RX ADMIN — ATORVASTATIN CALCIUM 80 MG: 40 TABLET, FILM COATED ORAL at 18:26

## 2019-04-12 RX ADMIN — CHLORHEXIDINE GLUCONATE 0.12% ORAL RINSE 15 ML: 1.2 LIQUID ORAL at 21:53

## 2019-04-12 RX ADMIN — CHLORHEXIDINE GLUCONATE 0.12% ORAL RINSE 15 ML: 1.2 LIQUID ORAL at 10:05

## 2019-04-12 RX ADMIN — WARFARIN SODIUM 5 MG: 5 TABLET ORAL at 18:26

## 2019-04-12 RX ADMIN — Medication 1000 MG: at 13:11

## 2019-04-12 RX ADMIN — INSULIN GLARGINE 48 UNITS: 100 INJECTION, SOLUTION SUBCUTANEOUS at 23:22

## 2019-04-12 RX ADMIN — CARBIDOPA AND LEVODOPA 2 TABLET: 25; 100 TABLET ORAL at 21:53

## 2019-04-13 VITALS
HEIGHT: 71 IN | TEMPERATURE: 98.2 F | DIASTOLIC BLOOD PRESSURE: 68 MMHG | OXYGEN SATURATION: 98 % | HEART RATE: 75 BPM | SYSTOLIC BLOOD PRESSURE: 131 MMHG | RESPIRATION RATE: 19 BRPM | WEIGHT: 174.38 LBS | BODY MASS INDEX: 24.41 KG/M2

## 2019-04-13 LAB
GLUCOSE SERPL-MCNC: 245 MG/DL (ref 65–140)
GLUCOSE SERPL-MCNC: 261 MG/DL (ref 65–140)
INR PPP: 1.14 (ref 0.86–1.17)
PROTHROMBIN TIME: 14.5 SECONDS (ref 11.8–14.2)

## 2019-04-13 PROCEDURE — 85610 PROTHROMBIN TIME: CPT | Performed by: PHYSICIAN ASSISTANT

## 2019-04-13 PROCEDURE — 82948 REAGENT STRIP/BLOOD GLUCOSE: CPT

## 2019-04-13 PROCEDURE — 99239 HOSP IP/OBS DSCHRG MGMT >30: CPT | Performed by: PHYSICIAN ASSISTANT

## 2019-04-13 RX ORDER — LEVOFLOXACIN 500 MG/1
500 TABLET, FILM COATED ORAL EVERY 24 HOURS
Qty: 3 TABLET | Refills: 0 | Status: CANCELLED | OUTPATIENT
Start: 2019-04-13 | End: 2019-04-16

## 2019-04-13 RX ORDER — LEVOFLOXACIN 25 MG/ML
500 SOLUTION ORAL DAILY
Qty: 75 ML | Refills: 0 | Status: SHIPPED | OUTPATIENT
Start: 2019-04-13 | End: 2019-04-16

## 2019-04-13 RX ORDER — INSULIN GLARGINE 100 [IU]/ML
48 INJECTION, SOLUTION SUBCUTANEOUS
Qty: 1440 UNITS | Refills: 0 | Status: SHIPPED | OUTPATIENT
Start: 2019-04-13

## 2019-04-13 RX ORDER — POLYETHYLENE GLYCOL 3350 17 G/17G
17 POWDER, FOR SOLUTION ORAL DAILY
Qty: 14 EACH | Refills: 0 | Status: SHIPPED | OUTPATIENT
Start: 2019-04-14

## 2019-04-13 RX ORDER — WARFARIN SODIUM 6 MG/1
TABLET ORAL
Qty: 30 TABLET | Refills: 0 | Status: SHIPPED | OUTPATIENT
Start: 2019-04-13

## 2019-04-13 RX ADMIN — ENOXAPARIN SODIUM 120 MG: 120 INJECTION SUBCUTANEOUS at 09:23

## 2019-04-13 RX ADMIN — CYANOCOBALAMIN TAB 500 MCG 250 MCG: 500 TAB at 09:22

## 2019-04-13 RX ADMIN — INSULIN LISPRO 2 UNITS: 100 INJECTION, SOLUTION INTRAVENOUS; SUBCUTANEOUS at 09:32

## 2019-04-13 RX ADMIN — CARBIDOPA AND LEVODOPA 2 TABLET: 25; 100 TABLET ORAL at 09:22

## 2019-04-13 RX ADMIN — POLYETHYLENE GLYCOL 3350 17 G: 17 POWDER, FOR SOLUTION ORAL at 09:23

## 2019-04-13 RX ADMIN — Medication 1000 MG: at 02:00

## 2019-04-14 LAB — BACTERIA BLD CULT: NORMAL

## 2019-04-15 ENCOUNTER — TELEPHONE (OUTPATIENT)
Dept: UROLOGY | Facility: AMBULATORY SURGERY CENTER | Age: 79
End: 2019-04-15

## 2019-04-15 LAB — BACTERIA BLD CULT: NORMAL

## 2019-04-30 ENCOUNTER — TELEPHONE (OUTPATIENT)
Dept: NEUROLOGY | Facility: CLINIC | Age: 79
End: 2019-04-30

## 2019-05-01 ENCOUNTER — TELEPHONE (OUTPATIENT)
Dept: SURGERY | Facility: HOSPITAL | Age: 79
End: 2019-05-01

## 2019-06-12 ENCOUNTER — TELEPHONE (OUTPATIENT)
Dept: NEUROLOGY | Facility: CLINIC | Age: 79
End: 2019-06-12

## 2019-08-26 NOTE — SOCIAL WORK
CM received notification that per [facility] pt is medically cleared to d/c to [facility] today  CM requested PT/OT prioritize pt for this morning for therapy note updates  CM placed call to pt's daughter and caregiver Luz Marina Hayesy to discuss DCP - per Luz Marina Perez family preference is for [facility]  Discussed that [facility] and [facility] are only local facilities that accept [facility]  Daughter also requests referral to [facility]  Daughter reports if local facilities do not offer bed she is open to [facility] in [facility]  Updated clinicals sent via [facility] to above facilities  CM awaiting PT notes and will follow up with daughter after - daughter reports she will be at bedside in 1 hour  CM department will continue to follow  No pertinent family history in first degree relatives